# Patient Record
Sex: MALE | Race: WHITE | Employment: OTHER | URBAN - METROPOLITAN AREA
[De-identification: names, ages, dates, MRNs, and addresses within clinical notes are randomized per-mention and may not be internally consistent; named-entity substitution may affect disease eponyms.]

---

## 2017-01-16 ENCOUNTER — TELEPHONE (OUTPATIENT)
Dept: GASTROENTEROLOGY | Facility: CLINIC | Age: 77
End: 2017-01-16

## 2017-01-16 NOTE — TELEPHONE ENCOUNTER
Pt contacted. He saw Dr Yonatan Eid today, and was instructed to call for consult. He has occasional diarrhea, but even when not diarrhea, pt having involuntary stools.  He has GI at the [place], but they can't see him until late March, and he would like to do care

## 2017-01-16 NOTE — TELEPHONE ENCOUNTER
Please advise and call patient to let his kown if dr can see him he has no control over his bowels pcp referred him

## 2017-04-11 ENCOUNTER — OFFICE VISIT (OUTPATIENT)
Dept: GASTROENTEROLOGY | Facility: CLINIC | Age: 77
End: 2017-04-11

## 2017-04-11 VITALS
HEIGHT: 71.5 IN | SYSTOLIC BLOOD PRESSURE: 125 MMHG | WEIGHT: 280.19 LBS | HEART RATE: 64 BPM | DIASTOLIC BLOOD PRESSURE: 76 MMHG | BODY MASS INDEX: 38.37 KG/M2

## 2017-04-11 DIAGNOSIS — Z86.010 HISTORY OF COLON POLYPS: ICD-10-CM

## 2017-04-11 DIAGNOSIS — R19.4 CHANGE IN BOWEL HABITS: Primary | ICD-10-CM

## 2017-04-11 DIAGNOSIS — Z87.438 HISTORY OF PROSTATE DISORDER: ICD-10-CM

## 2017-04-11 DIAGNOSIS — R10.9 ABDOMINAL PAIN IN MALE: ICD-10-CM

## 2017-04-11 PROCEDURE — G0463 HOSPITAL OUTPT CLINIC VISIT: HCPCS | Performed by: INTERNAL MEDICINE

## 2017-04-11 PROCEDURE — 99214 OFFICE O/P EST MOD 30 MIN: CPT | Performed by: INTERNAL MEDICINE

## 2017-04-11 NOTE — H&P
History of present illness: This is a 56-year-old male patient of Dr. Viry Alvarez who is normally followed at the [facility].  He has a history of colon polyps dating to 2011. He had colonoscopy at that time by Dr. Renata Hernandez. He brings partial records. agreeable to proceed. Urology appointment has also been advised and several names were given.

## 2017-04-11 NOTE — PROGRESS NOTES
HPI:    Patient ID: Jennifer Neff is a 78year old male. HPI    Review of Systems   Constitutional: Positive for fatigue and unexpected weight change. Negative for fever, chills, diaphoresis, activity change and appetite change.    HENT: Negative for ear Take 25 mg by mouth 2 (two) times daily. Disp:  Rfl:    lisinopril (PRINIVIL,ZESTRIL) 40 MG Oral Tab Take 40 mg by mouth daily. Disp:  Rfl:    Metoprolol Tartrate (LOPRESSOR) 100 MG Oral Tab Take 50 mg by mouth 2 (two) times daily.    Disp:  Rfl:    Salome Gamboa breath sounds normal. No stridor. No respiratory distress. He has no wheezes. He has no rales. Abdominal: Soft. Normal appearance and bowel sounds are normal. He exhibits no distension, no fluid wave, no ascites and no mass.  There is no hepatosplenomegal

## 2017-04-11 NOTE — PATIENT INSTRUCTIONS
1.  Continue MiraLAX and fiber daily and adjust as needed according to bowel movements    2. Schedule CT abdomen and pelvis with  oral contrast (allergy to IV contrast)    3. Schedule an appointment with urology    4.   Schedule colonoscopy with split dos

## 2017-04-12 ENCOUNTER — TELEPHONE (OUTPATIENT)
Dept: GASTROENTEROLOGY | Facility: CLINIC | Age: 77
End: 2017-04-12

## 2017-04-12 NOTE — TELEPHONE ENCOUNTER
Scheduled for:  Colonoscopy 84063  Provider Name: Dr Joycelyn Dandy  Date:  Fri 4/28/17  Location:  [facility]  Sedation:  MAC  Time:  12:30 pm  Prep: miralax  Meds/Allergies Reconciled?:  Latex, radiology contrast Iodinated dyes  Diagnosis with codes:  Change of bowel habi

## 2017-04-24 ENCOUNTER — TELEPHONE (OUTPATIENT)
Dept: NEUROLOGY | Facility: CLINIC | Age: 77
End: 2017-04-24

## 2017-04-24 NOTE — TELEPHONE ENCOUNTER
Patient informed he will need to be re-evaluated in the office prior to Dr. Viann Meckel performing an injection. LOV 10/5/16 patient cancelled 2 appointments (1/11/17 and 2/22/17) and no showed one appointment (3/22/17) since last office visit.   Follow up schedu

## 2017-04-25 ENCOUNTER — TELEPHONE (OUTPATIENT)
Dept: GASTROENTEROLOGY | Facility: CLINIC | Age: 77
End: 2017-04-25

## 2017-04-25 NOTE — TELEPHONE ENCOUNTER
Rescheduled for:  Colonoscopy 75772  Provider Name:  Dr. Elodia Thurston  Date:    From-4/28/17  To-5/31/17  [facility]  Sedation:  MAC  Time:   Prince Nunez (pt is aware that [facility] will call the day before to confirm arrival time)  Prep:  Britney

## 2017-05-01 ENCOUNTER — TELEPHONE (OUTPATIENT)
Dept: GASTROENTEROLOGY | Facility: CLINIC | Age: 77
End: 2017-05-01

## 2017-05-01 ENCOUNTER — TELEPHONE (OUTPATIENT)
Dept: NEUROLOGY | Facility: CLINIC | Age: 77
End: 2017-05-01

## 2017-05-01 DIAGNOSIS — M48.02 FORAMINAL STENOSIS OF CERVICAL REGION: ICD-10-CM

## 2017-05-01 DIAGNOSIS — M54.12 CERVICAL RADICULOPATHY: Primary | ICD-10-CM

## 2017-05-01 DIAGNOSIS — M50.90 CERVICAL DISC DISEASE: ICD-10-CM

## 2017-05-01 NOTE — TELEPHONE ENCOUNTER
Records from [facility] reviewed and sent for scanning. Patient had flexible sigmoidoscopy performed in 2015 which is performed because of constipation and incomplete rectal evacuation.   Findings were normal to depth of insertion except for 3 mm re

## 2017-05-01 NOTE — TELEPHONE ENCOUNTER
Spoke to patient who states he is having the same left side neck pain radiating in the left shoulder. Pain is desxcribed as a shooting pain that is intermittent with a pain score of 4-9/10 depending on activity.  Patient is requesting new physical therapy o

## 2017-05-06 ENCOUNTER — HOSPITAL ENCOUNTER (OUTPATIENT)
Dept: CT IMAGING | Facility: HOSPITAL | Age: 77
Discharge: HOME | End: 2017-05-06
Attending: INTERNAL MEDICINE
Payer: MEDICARE

## 2017-05-06 DIAGNOSIS — R10.9 ABDOMINAL PAIN IN MALE: ICD-10-CM

## 2017-05-06 PROCEDURE — 74176 CT ABD & PELVIS W/O CONTRAST: CPT | Performed by: INTERNAL MEDICINE

## 2017-05-10 ENCOUNTER — OFFICE VISIT (OUTPATIENT)
Dept: NEUROLOGY | Facility: CLINIC | Age: 77
End: 2017-05-10

## 2017-05-10 VITALS
HEART RATE: 60 BPM | HEIGHT: 72 IN | BODY MASS INDEX: 37.11 KG/M2 | DIASTOLIC BLOOD PRESSURE: 80 MMHG | RESPIRATION RATE: 18 BRPM | WEIGHT: 274 LBS | OXYGEN SATURATION: 92 % | SYSTOLIC BLOOD PRESSURE: 142 MMHG

## 2017-05-10 DIAGNOSIS — M48.061 LUMBAR CANAL STENOSIS: ICD-10-CM

## 2017-05-10 DIAGNOSIS — G89.29 CHRONIC MIDLINE LOW BACK PAIN WITHOUT SCIATICA: ICD-10-CM

## 2017-05-10 DIAGNOSIS — M54.50 CHRONIC MIDLINE LOW BACK PAIN WITHOUT SCIATICA: ICD-10-CM

## 2017-05-10 DIAGNOSIS — M96.1 LUMBAR POST-LAMINECTOMY SYNDROME: ICD-10-CM

## 2017-05-10 DIAGNOSIS — M48.02 FORAMINAL STENOSIS OF CERVICAL REGION: ICD-10-CM

## 2017-05-10 DIAGNOSIS — M54.16 LUMBAR RADICULOPATHY: ICD-10-CM

## 2017-05-10 DIAGNOSIS — M50.90 CERVICAL DISC DISEASE: ICD-10-CM

## 2017-05-10 DIAGNOSIS — R26.9 NEUROLOGIC GAIT DYSFUNCTION: ICD-10-CM

## 2017-05-10 DIAGNOSIS — M48.061 LUMBAR FORAMINAL STENOSIS: Primary | ICD-10-CM

## 2017-05-10 DIAGNOSIS — M51.26 LUMBAR HERNIATED DISC: ICD-10-CM

## 2017-05-10 DIAGNOSIS — M51.9 LUMBAR DISC DISEASE: ICD-10-CM

## 2017-05-10 PROCEDURE — 99214 OFFICE O/P EST MOD 30 MIN: CPT | Performed by: PHYSICAL MEDICINE & REHABILITATION

## 2017-05-10 NOTE — PROGRESS NOTES
Low Back Pain H & P    Chief Complaint: Patient presents with:  Low Back Pain: LOV 10/05/16. pt had lumbar injection 05/10/16 with 90% improvement. Pt now has reoccurring low back pain. Pt has back pain if sitting to long or with activity, none at present. History  None on file     Social History Main Topics   Smoking status: Never Smoker     Smokeless tobacco: Never Used    Alcohol Use: Yes  0.0 oz/week    0 Standard drinks or equivalent per week         Comment: Occasional glass of wine    Drug Use: No Spine:    Scoliosis: No scoliosis present     Lumbar Spine Palpation:    Spinous Processes: Non-tender for all Spinous Processes   Z-joints: Non-tender for all Z-joints   SIJ: Non-tender for bilateral SIJ   Piriformis Muscle: Non-tender bilateral Piriformi worked. The patient understands and agrees with the stated plan. Zainab Carranza MD  5/10/2017

## 2017-05-10 NOTE — PATIENT INSTRUCTIONS
Refill policies:    • Allow 2 business days for refills; controlled substances may take longer.   • Contact your pharmacy at least 5 days prior to running out of medication and have them send an electronic request or submit request through the “request re your physician has recommended that you have a procedure or additional testing performed. [facility] will contact your insurance carrier to obtain pre-certification or prior authorization.     Unfortunately, SHENG has seen an increas

## 2017-05-10 NOTE — PROGRESS NOTES
Patient has been scheduled for a bilateral L5 TFESI  on 05/23/17 at the [place]. Medications and allergies reviewed. Patient informed to hold aspirins, nsaids, blood thinners, vitamins and fish oils 5-7 days prior to procedure.  Patient informed we will need v

## 2017-05-17 ENCOUNTER — TELEPHONE (OUTPATIENT)
Dept: GASTROENTEROLOGY | Facility: CLINIC | Age: 77
End: 2017-05-17

## 2017-05-17 NOTE — TELEPHONE ENCOUNTER
Please send letter. Denisse Kline also send a copy of CT scan of the abdomen and pelvis to Dr. Javi Manzanares' office    I mailed letter to pt   I faxed over a copy of letter and CT scan report to Dr Liang Duarte to [phone]  Tel # [phone]  Spoke to Duane Traylor to felix

## 2017-05-22 ENCOUNTER — TELEPHONE (OUTPATIENT)
Dept: NEUROLOGY | Facility: CLINIC | Age: 77
End: 2017-05-22

## 2017-05-22 NOTE — TELEPHONE ENCOUNTER
Spoke to patient who had teeth extracted and is on antibiotics until 5/29/17. Patient calling to re-schedule bilateral L5 TFESIs on 5/23/17. Will be completely off antibiotics on 5/29/17 and re-scheduled in 6/2/17.     Patient reminded to hold aspirins, nsa

## 2017-05-31 ENCOUNTER — LAB REQUISITION (OUTPATIENT)
Dept: LAB | Facility: HOSPITAL | Age: 77
End: 2017-05-31
Payer: MEDICARE

## 2017-05-31 ENCOUNTER — TELEPHONE (OUTPATIENT)
Dept: GASTROENTEROLOGY | Facility: CLINIC | Age: 77
End: 2017-05-31

## 2017-05-31 DIAGNOSIS — Z01.89 ENCOUNTER FOR OTHER SPECIFIED SPECIAL EXAMINATIONS: ICD-10-CM

## 2017-05-31 PROCEDURE — 88305 TISSUE EXAM BY PATHOLOGIST: CPT | Performed by: INTERNAL MEDICINE

## 2017-05-31 NOTE — TELEPHONE ENCOUNTER
Colonoscopy ( [facility]):    preop dx: hx colon polyps, abdominal pain, change in bowel habits  Postop dx: s/p polypectomy x 2, cold snare, transverse, diverticular disease, left sided, internal hemorrhoids    Rec: path pending, patient advised to get sooner ur

## 2017-06-02 ENCOUNTER — OFFICE VISIT (OUTPATIENT)
Dept: SURGERY | Facility: CLINIC | Age: 77
End: 2017-06-02

## 2017-06-02 DIAGNOSIS — M54.16 LUMBAR RADICULOPATHY: Primary | ICD-10-CM

## 2017-06-02 DIAGNOSIS — M96.1 LUMBAR POST-LAMINECTOMY SYNDROME: ICD-10-CM

## 2017-06-02 DIAGNOSIS — M48.061 LUMBAR CANAL STENOSIS: ICD-10-CM

## 2017-06-02 PROCEDURE — 64483 NJX AA&/STRD TFRM EPI L/S 1: CPT | Performed by: PHYSICAL MEDICINE & REHABILITATION

## 2017-06-02 NOTE — PROCEDURES
[facility].    BILATERAL LUMBAR TRANSFORAMINAL   NAME:  Gopal Ruiz    MR #:    [id number] :  1940     PHYSICIAN:  Taty Manzo        Operative Report    DATE OF PROCEDURE: 2017   PREOPERATIVE DIAGNOSES: 1. 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 At this point in time, the patient's skin was anesthetized with 1 to 2 cc of 50:50 mixture of sodium bicarbonate and 1% PF lidocaine without epinephrine.   Then, a 5 inch, 22-gauge spinal

## 2017-06-07 ENCOUNTER — TELEPHONE (OUTPATIENT)
Dept: GASTROENTEROLOGY | Facility: CLINIC | Age: 77
End: 2017-06-07

## 2017-06-07 NOTE — TELEPHONE ENCOUNTER
Please send letter and recall colonoscopy for 5 years.           I mailed out colonoscopy letter to pt  Entered pt into 5 yr recall  em

## 2017-06-16 ENCOUNTER — TELEPHONE (OUTPATIENT)
Dept: NEUROLOGY | Facility: CLINIC | Age: 77
End: 2017-06-16

## 2017-06-16 NOTE — TELEPHONE ENCOUNTER
AP[facility] called patient and he states since his injection Bilateral L5 TFESI on 6/2/17 he has received 100% pain relief. Patient very happy regarding these results. Patient has NOV scheduled in 3 months.  Pt has started physical therapy @ [facility] as ordered by

## 2017-06-27 ENCOUNTER — MED REC SCAN ONLY (OUTPATIENT)
Dept: NEUROLOGY | Facility: CLINIC | Age: 77
End: 2017-06-27

## 2017-07-03 ENCOUNTER — OFFICE VISIT (OUTPATIENT)
Dept: SURGERY | Facility: CLINIC | Age: 77
End: 2017-07-03

## 2017-07-03 VITALS
BODY MASS INDEX: 37.11 KG/M2 | DIASTOLIC BLOOD PRESSURE: 77 MMHG | TEMPERATURE: 98 F | HEIGHT: 72 IN | WEIGHT: 274 LBS | SYSTOLIC BLOOD PRESSURE: 111 MMHG | HEART RATE: 67 BPM

## 2017-07-03 DIAGNOSIS — N39.41 URGE INCONTINENCE: ICD-10-CM

## 2017-07-03 DIAGNOSIS — R35.1 NOCTURIA: ICD-10-CM

## 2017-07-03 DIAGNOSIS — Z79.82 ASPIRIN LONG-TERM USE: ICD-10-CM

## 2017-07-03 DIAGNOSIS — N32.89 BLADDER MASS: ICD-10-CM

## 2017-07-03 DIAGNOSIS — N40.1 BENIGN NON-NODULAR PROSTATIC HYPERPLASIA WITH LOWER URINARY TRACT SYMPTOMS: Primary | ICD-10-CM

## 2017-07-03 LAB
BILIRUB UR QL: NEGATIVE
CLARITY UR: CLEAR
COLOR UR: YELLOW
GLUCOSE UR-MCNC: NEGATIVE MG/DL
HGB UR QL STRIP.AUTO: NEGATIVE
KETONES UR-MCNC: NEGATIVE MG/DL
LEUKOCYTE ESTERASE UR QL STRIP.AUTO: NEGATIVE
NITRITE UR QL STRIP.AUTO: NEGATIVE
PH UR: 6 [PH] (ref 5–8)
PROT UR-MCNC: NEGATIVE MG/DL
SP GR UR STRIP: 1.01 (ref 1–1.03)
UROBILINOGEN UR STRIP-ACNC: <2
VIT C UR-MCNC: NEGATIVE MG/DL

## 2017-07-03 PROCEDURE — 88108 CYTOPATH CONCENTRATE TECH: CPT | Performed by: UROLOGY

## 2017-07-03 PROCEDURE — 51741 ELECTRO-UROFLOWMETRY FIRST: CPT | Performed by: UROLOGY

## 2017-07-03 PROCEDURE — 99205 OFFICE O/P NEW HI 60 MIN: CPT | Performed by: UROLOGY

## 2017-07-03 NOTE — PROGRESS NOTES
Maldonado Culp is a 78year old male. HPI:   Patient presents with:  BPH  Urinary Frequency: patient states he urinates every hour during the daytime with normal fluid intake and nocturia x3    History provided by dianna Coleman present.      1. Voiding feverish at the time. Not in ICU. No operations performed at the time. No recurrence since. Denies personal hx or FHx of kidney stones. He states his brother has a cystoscopy to get his bladder checked every 6 months; no tumor discovered as of yet.      Smo (LASIX) 20 MG Oral Tab Take 40 mg by mouth daily. Disp:  Rfl:    hydrALAzine HCl (APRESOLINE) 25 MG Oral Tab Take 25 mg by mouth 2 (two) times daily. Disp:  Rfl:    lisinopril (PRINIVIL,ZESTRIL) 40 MG Oral Tab Take 40 mg by mouth daily.  Disp:  Rfl:    Me Negative for ear drainage, hearing loss and nasal drainage  Eyes:  Negative for eye discharge. Positive for blurred vision. Hema/Lymph:  Negative for easy bleeding and easy bruising  Integumentary:  Negative for pruritus. Positive for rash.    Musculoskel (pedal)    LABORATORIES    No results found. Because of  severe  voiding problems, decision made to perform complex uroflow 7/3/17.   FINDINGS:   Peak flow rate  7.0 ml/s    Average flow rate  3.9 ml/s  Shape of uroflow curve gently oscillating   Voided increased risk for having tumors or stones of the urinary tract. Although it was discussed with pt that it is unlikely that he has bladder cancer.  Therefore, I recommend urine cytology followed by cystoscopy with possible biopsy, either in the office with cytology, bladder ultrasound and office cystoscopy will all shed light on this problem. RECOMMENDATIONS AND TREATMENT PLAN      1. Urine specimen from uroflow test today for complete urinalysis and urine for cytology    2.   We cannot prescribe Floma performance and is accurate and complete.   Darcy Campuzano MD, 7/3/2017, 1:03 PM

## 2017-07-03 NOTE — PATIENT INSTRUCTIONS
1. Urine specimen from uroflow test today for complete urinalysis and urine for cytology    2.   We cannot prescribe Flomax/tamsulosin or Uroxatral/alfuzosin because these medications the side effects of slight lightheadedness or dizziness and you alread prostate. · Imaging tests. X-rays and other imaging tests can find problems in your kidneys or bladder. · Cystoscopy. This test uses a flexible tube with a camera (scope) to look inside your urinary tract. · Urine flow study.  This test uses a special de more likely to develop this cancer. But they may have higher levels of the prostate-specific antigen (PSA). A higher PSA level may also be a sign of prostate cancer. Certain tests help distinguish BPH from prostate cancer.  They include prostate ultrasound

## 2017-07-31 ENCOUNTER — TELEPHONE (OUTPATIENT)
Dept: SURGERY | Facility: CLINIC | Age: 77
End: 2017-07-31

## 2017-08-01 NOTE — TELEPHONE ENCOUNTER
Called and scheduled appointment for cystoscopy with [facility] for 9/11/17 at 1:20pm. Answered all questions and patient verbalized understanding. Informed patient to call us if he has any other questions or concerns.

## 2017-08-09 ENCOUNTER — OFFICE VISIT (OUTPATIENT)
Dept: NEUROLOGY | Facility: CLINIC | Age: 77
End: 2017-08-09

## 2017-08-09 ENCOUNTER — TELEPHONE (OUTPATIENT)
Dept: NEUROLOGY | Facility: CLINIC | Age: 77
End: 2017-08-09

## 2017-08-09 VITALS
HEIGHT: 72 IN | RESPIRATION RATE: 18 BRPM | OXYGEN SATURATION: 94 % | BODY MASS INDEX: 37.11 KG/M2 | DIASTOLIC BLOOD PRESSURE: 68 MMHG | WEIGHT: 274 LBS | SYSTOLIC BLOOD PRESSURE: 128 MMHG | HEART RATE: 54 BPM

## 2017-08-09 DIAGNOSIS — M48.061 LUMBAR FORAMINAL STENOSIS: ICD-10-CM

## 2017-08-09 DIAGNOSIS — M96.1 LUMBAR POST-LAMINECTOMY SYNDROME: ICD-10-CM

## 2017-08-09 DIAGNOSIS — M48.061 LUMBAR CANAL STENOSIS: ICD-10-CM

## 2017-08-09 DIAGNOSIS — M54.16 LUMBAR RADICULOPATHY: ICD-10-CM

## 2017-08-09 DIAGNOSIS — M25.512 LEFT SHOULDER PAIN, UNSPECIFIED CHRONICITY: Primary | ICD-10-CM

## 2017-08-09 DIAGNOSIS — M25.512 ACUTE PAIN OF LEFT SHOULDER: ICD-10-CM

## 2017-08-09 DIAGNOSIS — M48.02 FORAMINAL STENOSIS OF CERVICAL REGION: ICD-10-CM

## 2017-08-09 DIAGNOSIS — M51.26 LUMBAR HERNIATED DISC: ICD-10-CM

## 2017-08-09 DIAGNOSIS — M51.9 LUMBAR DISC DISEASE: ICD-10-CM

## 2017-08-09 DIAGNOSIS — M67.912 DYSFUNCTION OF LEFT ROTATOR CUFF: ICD-10-CM

## 2017-08-09 DIAGNOSIS — M50.90 CERVICAL DISC DISEASE: ICD-10-CM

## 2017-08-09 PROCEDURE — 99214 OFFICE O/P EST MOD 30 MIN: CPT | Performed by: PHYSICAL MEDICINE & REHABILITATION

## 2017-08-09 NOTE — PATIENT INSTRUCTIONS
Refill policies:    • Allow 2 business days for refills; controlled substances may take longer.   • Contact your pharmacy at least 5 days prior to running out of medication and have them send an electronic request or submit request through the “request re your physician has recommended that you have a procedure or additional testing performed. [facility] will contact your insurance carrier to obtain pre-certification or prior authorization.     Unfortunately, SHENG has seen an increas

## 2017-08-09 NOTE — PROGRESS NOTES
Cervical Pain H & P    Chief Complaint:  Patient presents with:  Low Back Pain: Pt had bilateral L5 TFESI on 06/02/17 with !00% relief of symtoms. Pt is not c/o of any back pain. Pt present today with left shoulder pain 6/19.  Pt stopped physical therapy fo N/A  Number of children: N/A     Occupational History  None on file     Social History Main Topics   Smoking status: Never Smoker    Smokeless tobacco: Never Used    Alcohol use Yes  0.0 oz/week     Comment: Occasional glass of wine    Drug use: No    Sexu rotator Left: 4/5  Deltoid Left: 4+/5  Serratus anterior Left: 3/5   Reflexes: 2+ In the bilateral upper extremities. Osorio's sign Right: Negative   Osorio's sigh Left: Negative     Shoulder: The shoulders are stable.     Medial Border Scapular Winging Bernardo Prieto MD  8/9/2017

## 2017-08-09 NOTE — TELEPHONE ENCOUNTER
Medicare Online for insurance coverage of left shoulder injection cpt codes 41369, 16043,. Insurance was verified and procedure is a covered benefit and does not require authorization. Will inform Nursing.

## 2017-09-11 ENCOUNTER — TELEPHONE (OUTPATIENT)
Dept: SURGERY | Facility: CLINIC | Age: 77
End: 2017-09-11

## 2017-09-11 NOTE — TELEPHONE ENCOUNTER
pt called. He is sick, called to cancel todays Cysto with [facility]. Would like to reschedule.   Thank you

## 2017-09-12 ENCOUNTER — TELEPHONE (OUTPATIENT)
Dept: SURGERY | Facility: CLINIC | Age: 77
End: 2017-09-12

## 2017-09-12 NOTE — TELEPHONE ENCOUNTER
Pt was scheduled for cysto on 09/11 and cancelled - requesting cb to reschedule - please advise - thank you.

## 2017-09-13 NOTE — TELEPHONE ENCOUNTER
Spoke to patient. Patient states he rescheduled his cysto for Wed 10/18/17 @ 8:00 am.  No further action required.

## 2017-09-20 ENCOUNTER — OFFICE VISIT (OUTPATIENT)
Dept: NEUROLOGY | Facility: CLINIC | Age: 77
End: 2017-09-20

## 2017-09-20 VITALS
RESPIRATION RATE: 16 BRPM | BODY MASS INDEX: 36 KG/M2 | DIASTOLIC BLOOD PRESSURE: 72 MMHG | WEIGHT: 265 LBS | SYSTOLIC BLOOD PRESSURE: 114 MMHG | HEART RATE: 68 BPM

## 2017-09-20 DIAGNOSIS — M67.912 DYSFUNCTION OF LEFT ROTATOR CUFF: ICD-10-CM

## 2017-09-20 DIAGNOSIS — M25.512 ACUTE PAIN OF LEFT SHOULDER: Primary | ICD-10-CM

## 2017-09-20 PROCEDURE — 20611 DRAIN/INJ JOINT/BURSA W/US: CPT | Performed by: PHYSICAL MEDICINE & REHABILITATION

## 2017-09-20 RX ORDER — LIDOCAINE HYDROCHLORIDE 10 MG/ML
2.5 INJECTION, SOLUTION INFILTRATION; PERINEURAL ONCE
Status: COMPLETED | OUTPATIENT
Start: 2017-09-20 | End: 2017-09-20

## 2017-09-20 RX ORDER — SPIRONOLACTONE 25 MG/1
25 TABLET ORAL DAILY
Status: ON HOLD | COMMUNITY
End: 2020-01-08

## 2017-09-20 RX ORDER — TRIAMCINOLONE ACETONIDE 40 MG/ML
60 INJECTION, SUSPENSION INTRA-ARTICULAR; INTRAMUSCULAR ONCE
Status: COMPLETED | OUTPATIENT
Start: 2017-09-20 | End: 2017-09-20

## 2017-09-20 NOTE — PATIENT INSTRUCTIONS
Refill policies:    • Allow 2 business days for refills; controlled substances may take longer.   • Contact your pharmacy at least 5 days prior to running out of medication and have them send an electronic request or submit request through the “request re your physician has recommended that you have a procedure or additional testing performed. [facility]) will contact your insurance carrier to obtain pre-certification or prior authorization.     Unfortunately, SHENG has seen an increas

## 2017-10-05 ENCOUNTER — TELEPHONE (OUTPATIENT)
Dept: GASTROENTEROLOGY | Facility: CLINIC | Age: 77
End: 2017-10-05

## 2017-10-05 DIAGNOSIS — R19.4 ALTERED BOWEL HABITS: ICD-10-CM

## 2017-10-05 DIAGNOSIS — K59.09 CHRONIC CONSTIPATION: ICD-10-CM

## 2017-10-05 DIAGNOSIS — R10.9 ABDOMINAL PAIN IN MALE: Primary | ICD-10-CM

## 2017-10-05 NOTE — TELEPHONE ENCOUNTER
It does not sound like  Intestinal, obstruction, rather urgency. CT scan abd and pelvis in May showed enlarge prostate, not intestinal obstruction. No labs are in our records except [facility]  I recommend CBC, TS  H, CMP.lipa

## 2017-10-05 NOTE — TELEPHONE ENCOUNTER
Pt contacted and states he is having the same issues prior to his CLN  On 6/2017. Believes he has a bowel obstruction b/c he has \"urge to go the bathroom, but when I do go it's diarrhea, and it's very small amount. \" States he has mid- abdominal pain rate

## 2017-10-05 NOTE — TELEPHONE ENCOUNTER
Orders entered per Dr. Iqra Salamanca written orders below.  Pt contacted and reviewed Dr. Iqra Salamanca message below, he verbalized understanding of all and will call central scheduling at [phone] to schedule his XR ab obstructive series and will complete labs tomorrow

## 2017-10-09 ENCOUNTER — LAB ENCOUNTER (OUTPATIENT)
Dept: LAB | Facility: HOSPITAL | Age: 77
End: 2017-10-09
Attending: INTERNAL MEDICINE
Payer: MEDICARE

## 2017-10-09 ENCOUNTER — HOSPITAL ENCOUNTER (OUTPATIENT)
Dept: GENERAL RADIOLOGY | Facility: HOSPITAL | Age: 77
Discharge: HOME | End: 2017-10-09
Attending: INTERNAL MEDICINE
Payer: MEDICARE

## 2017-10-09 DIAGNOSIS — R10.9 ABDOMINAL PAIN IN MALE: ICD-10-CM

## 2017-10-09 DIAGNOSIS — K59.09 CHRONIC CONSTIPATION: ICD-10-CM

## 2017-10-09 DIAGNOSIS — R19.4 ALTERED BOWEL HABITS: ICD-10-CM

## 2017-10-09 PROCEDURE — 36415 COLL VENOUS BLD VENIPUNCTURE: CPT

## 2017-10-09 PROCEDURE — 74020 XR ABDOMEN, OBSTRUCTIVE SERIES (CPT=74020): CPT | Performed by: INTERNAL MEDICINE

## 2017-10-09 PROCEDURE — 83690 ASSAY OF LIPASE: CPT

## 2017-10-09 PROCEDURE — 84443 ASSAY THYROID STIM HORMONE: CPT

## 2017-10-09 PROCEDURE — 85025 COMPLETE CBC W/AUTO DIFF WBC: CPT

## 2017-10-09 PROCEDURE — 80053 COMPREHEN METABOLIC PANEL: CPT

## 2017-10-18 ENCOUNTER — TELEPHONE (OUTPATIENT)
Dept: SURGERY | Facility: CLINIC | Age: 77
End: 2017-10-18

## 2017-10-23 ENCOUNTER — TELEPHONE (OUTPATIENT)
Dept: GASTROENTEROLOGY | Facility: CLINIC | Age: 77
End: 2017-10-23

## 2017-10-23 NOTE — TELEPHONE ENCOUNTER
Dr Asad White,    Pt actually has an appt on 10/31/17 for a change in bowel habits.     Future Appointments  Date Time Provider Ulices Yadira   10/31/2017 11:30 AM Jefe Cole MD [facility] [address] [place] [address]   11/15/2017 8:30 AM Mikki Manzo

## 2017-10-23 NOTE — TELEPHONE ENCOUNTER
Okay now I see the patient is on my schedule at 2 PM on December 6. He  can keep that appointmen instead of Wednesday, November 1. He should still take MiraLAX.   Please still inform him that his thyroid function is normal, kidney function abnormal.

## 2017-10-23 NOTE — TELEPHONE ENCOUNTER
Spoke with patient rescheduled cysto for 11/20 @ 2:40 advised to arrive 15 minutes early for procedure.

## 2017-10-23 NOTE — TELEPHONE ENCOUNTER
Pt requesting to be seen sooner than 12/6, pt is having problems moving his bowels, indicates its been a while, but is getting worse. Pls call at:[phone],thanks.

## 2017-10-23 NOTE — TELEPHONE ENCOUNTER
Pt has been trying to reach RN regarding rescheduling his appt with Dr Denzel hTomas. Had appt 9/18 but had to cancel due to migraine.

## 2017-10-23 NOTE — TELEPHONE ENCOUNTER
Spoke with pt and appt made for next week to discuss.     Future Appointments  Date Time Provider Ulices Yadira   10/31/2017 11:30 AM [facility]john [facility] [place]   11/15/2017 8:30 AM Tere Manzo MD [facility]

## 2017-10-23 NOTE — TELEPHONE ENCOUNTER
I called the pt. He states he has been trying to reach Urology to schedule an appt but they are not calling him back. I have a note from Dr Geovanni Basurto that the pt should f/u with urology due to a high creatine level. Please call the pt back.  I routed this messag

## 2017-10-23 NOTE — TELEPHONE ENCOUNTER
Pt called stating pt has not heard back from the nurse to schedule cysto. Pt stated dr gonsalves's office called pt with results. Something is elevated with the kidneys.   Call pt

## 2017-10-23 NOTE — TELEPHONE ENCOUNTER
Still patient his thyroid function is normal.  If he wants to see me in return follow-up I can see him on Wednesday, November 1 at 4 PM.  His kidney function is abnormal and he should follow-up with his urologist or his primary care provider regarding a cr

## 2017-10-23 NOTE — TELEPHONE ENCOUNTER
He should see Dr. Flor Avitia,, Dr. Jackie Moscoso, or Dr. Nicolasa Lobato within a few weeks. He has an enlarged prostate associated with elevated BUN and creatinine.   Alternatively he should follow-up with his primary care provider

## 2017-10-24 ENCOUNTER — TELEPHONE (OUTPATIENT)
Dept: SURGERY | Facility: CLINIC | Age: 77
End: 2017-10-24

## 2017-10-24 NOTE — TELEPHONE ENCOUNTER
Dr Rafia Lares,    Do you want this pt to delay his appt with you or is it still ok for him to come see you?  See appt below    The nurse manager from Urology contacted and  spoke with this pt and he is scheduled for his repeat Cysto on 11/20/17    Future Appointm

## 2017-10-24 NOTE — TELEPHONE ENCOUNTER
Nurse from the gastro dept requesting to speak with the nurse regarding mutual pt. Call transferred to the nurse.

## 2017-10-31 ENCOUNTER — OFFICE VISIT (OUTPATIENT)
Dept: GASTROENTEROLOGY | Facility: CLINIC | Age: 77
End: 2017-10-31

## 2017-10-31 VITALS
DIASTOLIC BLOOD PRESSURE: 76 MMHG | SYSTOLIC BLOOD PRESSURE: 132 MMHG | WEIGHT: 256.19 LBS | BODY MASS INDEX: 35.08 KG/M2 | HEIGHT: 71.5 IN | HEART RATE: 82 BPM

## 2017-10-31 DIAGNOSIS — K59.09 CHRONIC CONSTIPATION: Primary | ICD-10-CM

## 2017-10-31 DIAGNOSIS — R93.5 ABNORMAL CT OF THE ABDOMEN: ICD-10-CM

## 2017-10-31 DIAGNOSIS — Z86.010 HISTORY OF COLON POLYPS: ICD-10-CM

## 2017-10-31 DIAGNOSIS — M62.89 PELVIC FLOOR DYSFUNCTION: ICD-10-CM

## 2017-10-31 PROCEDURE — 99214 OFFICE O/P EST MOD 30 MIN: CPT | Performed by: INTERNAL MEDICINE

## 2017-10-31 RX ORDER — ALPRAZOLAM 0.5 MG/1
0.5 TABLET ORAL NIGHTLY PRN
COMMUNITY
End: 2018-10-31

## 2017-10-31 RX ORDER — BISACODYL 10 MG
10 SUPPOSITORY, RECTAL RECTAL DAILY
Qty: 30 SUPPOSITORY | Refills: 12 | Status: ON HOLD | OUTPATIENT
Start: 2017-10-31 | End: 2020-01-08

## 2017-10-31 NOTE — PATIENT INSTRUCTIONS
1. Continue Miralax twice daily to be adjusted depending on results. Continue citrocel daily  2. Add Dulcolax suppository daily as needed  3. Follow up with Dr. Jeremie Garcia as scheduled. Tell him about your kidney function  4.   Follow up with your cardiologis

## 2017-10-31 NOTE — PROGRESS NOTES
HPI:    Patient ID: Ayla Olivares is a 78year old male. HPI    Review of Systems   Constitutional: Negative for activity change, appetite change, chills, diaphoresis, fatigue, fever and unexpected weight change.    HENT: Negative for mouth sores, sore t allopurinol (ZYLOPRIM) 300 MG Oral Tab Take 300 mg by mouth daily. Disp:  Rfl:    AmLODIPine Besylate (NORVASC) 2.5 MG Oral Tab Take 2.5 mg by mouth daily. Disp:  Rfl:    Cinacalcet HCl (SENSIPAR) 30 MG Oral Tab Take 30 mg by mouth daily.  Disp:  Rfl: exhibits no discharge. No scleral icterus. Neck: Normal range of motion. Neck supple. No JVD present. No tracheal deviation present. No thyromegaly present. Cardiovascular: Normal rate, regular rhythm and normal heart sounds. No murmur heard.   Pulmo

## 2017-10-31 NOTE — H&P
History of present illness: This is a 82-year-old male here patient of Dr. Justice Mckeon in follow-up after colonoscopy. The patient had originally seen me for consultation in April of this year. He was diagnosed with pelvic floor dysfunction.   Indeed he h takes fiber additives. I have advised Dulcolax suppositories as needed. In addition I have advised that he follow-up with urology and his primary care provider regarding his partial bladder outlet obstruction, and coronary artery calcifications.   He chadwick

## 2017-11-20 ENCOUNTER — OFFICE VISIT (OUTPATIENT)
Dept: SURGERY | Facility: CLINIC | Age: 77
End: 2017-11-20

## 2017-11-20 VITALS
WEIGHT: 256 LBS | RESPIRATION RATE: 16 BRPM | HEIGHT: 72 IN | BODY MASS INDEX: 34.67 KG/M2 | HEART RATE: 48 BPM | SYSTOLIC BLOOD PRESSURE: 144 MMHG | TEMPERATURE: 99 F | DIASTOLIC BLOOD PRESSURE: 70 MMHG

## 2017-11-20 DIAGNOSIS — N19 RENAL FAILURE, UNSPECIFIED CHRONICITY: ICD-10-CM

## 2017-11-20 DIAGNOSIS — N39.41 URGE INCONTINENCE: ICD-10-CM

## 2017-11-20 DIAGNOSIS — Z79.82 ASPIRIN LONG-TERM USE: ICD-10-CM

## 2017-11-20 DIAGNOSIS — N40.1 BENIGN PROSTATIC HYPERPLASIA WITH URINARY FREQUENCY: ICD-10-CM

## 2017-11-20 DIAGNOSIS — R93.41 ABNORMAL CT SCAN, BLADDER: Primary | ICD-10-CM

## 2017-11-20 DIAGNOSIS — R35.0 BENIGN PROSTATIC HYPERPLASIA WITH URINARY FREQUENCY: ICD-10-CM

## 2017-11-20 PROCEDURE — 99213 OFFICE O/P EST LOW 20 MIN: CPT | Performed by: UROLOGY

## 2017-11-20 PROCEDURE — 52000 CYSTOURETHROSCOPY: CPT | Performed by: UROLOGY

## 2017-11-20 NOTE — PROGRESS NOTES
PREOPERATIVE DIAGNOSIS:     Bladder mass On CT 5/6/17      POSTOP DIAGNOSIS:               The same;  No stones, tumors or CIS     PROCEDURE:              Cystoscopy              ANESTHESIA:     2% Xylocaine jelly local;  also see above    FINDINGS:  Strict In order to have such an accurate discussion,     I first review with patient their  urological/ medical record  and    CHART REVIEW: UTI 5-6 years ago from 7/03/2017 and pt hospitalized at [facility]. He states he was feverish at the time. Not in ICU.  No o 5/6/17 CT abd + pelvis without contrast, indications = bilateral lower quadrant pain and pelvic pain with constipation: kidneys = low density probable cysts present bilaterally; bones = multilevel degenerative changes of the visualized spine, degenerative Patient is on aspirin 81 mg and at risk for future surgical procedures and will need to hold medication prior to any surgical procedure. RECOMMENDATIONS AND TREATMENT PLAN     1.   Because of new discovery of impaired kidney function/renal failure, pl Ubaldo Schwartz MD,  personally performed the services described in this documentation. All medical record entries made by the scribe were at my direction and in my presence.   I have reviewed the chart and discharge instructions (if applicable) and ag

## 2017-11-20 NOTE — PATIENT INSTRUCTIONS
1.  Because of new discovery of impaired kidney function/renal failure, please get ultrasound of the kidneys to make sure blockage of the kidneys has not developed since your CT of 5/6/17 which showed no blockage of the kidneys.   If ultrasound of the kidne needed. The goal of treatment is to improve urine flow and reduce symptoms. Treatments can include medicine and procedures. Your healthcare provider will discuss treatment options with you as needed.   Home care  The following guidelines will help you care infection (increased urge to urinate, burning when passing urine, foul-smelling urine)  Date Last Reviewed: 7/1/2016  © 7870-8542 [employer]. [address] [facility], [address] [place] [address]. All rights reserved.  This information is not intended as

## 2018-02-14 ENCOUNTER — HOSPITAL ENCOUNTER (OUTPATIENT)
Dept: ULTRASOUND IMAGING | Facility: HOSPITAL | Age: 78
Discharge: HOME | End: 2018-02-14
Attending: UROLOGY
Payer: MEDICARE

## 2018-02-14 DIAGNOSIS — N19 RENAL FAILURE, UNSPECIFIED CHRONICITY: ICD-10-CM

## 2018-02-14 PROCEDURE — 76770 US EXAM ABDO BACK WALL COMP: CPT | Performed by: UROLOGY

## 2018-02-15 ENCOUNTER — HOSPITAL ENCOUNTER (OUTPATIENT)
Dept: ULTRASOUND IMAGING | Facility: HOSPITAL | Age: 78
Discharge: HOME | End: 2018-02-15
Attending: UROLOGY
Payer: MEDICARE

## 2018-02-15 DIAGNOSIS — R93.89 IMAGE TEST INCONCLUSIVE: ICD-10-CM

## 2018-02-20 ENCOUNTER — TELEPHONE (OUTPATIENT)
Dept: GASTROENTEROLOGY | Facility: CLINIC | Age: 78
End: 2018-02-20

## 2018-02-20 NOTE — TELEPHONE ENCOUNTER
Kayla Grant PA-C is asking us to take this pt off her schedule this Friday and change him to Dr Shara Douglass schedule    2/23/2018 10:00 AM Jennifer Huang [facility]

## 2018-02-20 NOTE — TELEPHONE ENCOUNTER
I spoke to the pt, He was informed of the time change of his appt this Friday.  He is now on Dr Rosaline Phillips schedule at 1 pm. He verbalizes understanding of location and time    2/23/2018 1:00 PM Xin Crews MD [facility] [place] Dillonjason CASTILLO

## 2018-03-05 NOTE — TELEPHONE ENCOUNTER
Erick Hernandez, thank you for taking care of this initially. It seems as though the patient cancelled. Please have him scheduled with Dr. Tonny De La Vega. Will route to [facility] as well. Phone room should be aware.

## 2018-03-06 NOTE — TELEPHONE ENCOUNTER
JEFFYI - Pt ret'd call and scheduled appt with Dr. Samuel Gutierrez for 4/11/18 (next available). Appt with Ce on 3/17/18 was cancelled.

## 2018-03-06 NOTE — TELEPHONE ENCOUNTER
Ce    Pt is rescheduled per the phone room.     4/11/2018 2:00 PM Lynsey Newman MD [facility]

## 2018-04-26 ENCOUNTER — LAB ENCOUNTER (OUTPATIENT)
Dept: LAB | Facility: HOSPITAL | Age: 78
End: 2018-04-26
Attending: FAMILY MEDICINE
Payer: MEDICARE

## 2018-04-26 DIAGNOSIS — R19.7 DIARRHEA: Primary | ICD-10-CM

## 2018-04-26 DIAGNOSIS — R19.5 LOOSE STOOLS: ICD-10-CM

## 2018-04-26 PROCEDURE — 87493 C DIFF AMPLIFIED PROBE: CPT

## 2018-04-28 ENCOUNTER — TELEPHONE (OUTPATIENT)
Dept: GASTROENTEROLOGY | Facility: CLINIC | Age: 78
End: 2018-04-28

## 2018-08-14 ENCOUNTER — TELEPHONE (OUTPATIENT)
Dept: GASTROENTEROLOGY | Facility: CLINIC | Age: 78
End: 2018-08-14

## 2018-08-14 ENCOUNTER — OFFICE VISIT (OUTPATIENT)
Dept: GASTROENTEROLOGY | Facility: CLINIC | Age: 78
End: 2018-08-14
Payer: MEDICARE

## 2018-08-14 VITALS
HEART RATE: 60 BPM | WEIGHT: 273 LBS | SYSTOLIC BLOOD PRESSURE: 110 MMHG | DIASTOLIC BLOOD PRESSURE: 67 MMHG | HEIGHT: 72 IN | BODY MASS INDEX: 36.98 KG/M2

## 2018-08-14 DIAGNOSIS — Z86.010 HISTORY OF COLON POLYPS: Primary | ICD-10-CM

## 2018-08-14 DIAGNOSIS — K21.9 GASTROESOPHAGEAL REFLUX DISEASE, ESOPHAGITIS PRESENCE NOT SPECIFIED: Primary | ICD-10-CM

## 2018-08-14 DIAGNOSIS — R10.13 EPIGASTRIC PAIN: ICD-10-CM

## 2018-08-14 DIAGNOSIS — K59.09 CHRONIC CONSTIPATION: ICD-10-CM

## 2018-08-14 DIAGNOSIS — K21.9 GASTROESOPHAGEAL REFLUX DISEASE, ESOPHAGITIS PRESENCE NOT SPECIFIED: ICD-10-CM

## 2018-08-14 DIAGNOSIS — Z01.818 PREOPERATIVE CLEARANCE: ICD-10-CM

## 2018-08-14 PROCEDURE — 99214 OFFICE O/P EST MOD 30 MIN: CPT | Performed by: INTERNAL MEDICINE

## 2018-08-14 RX ORDER — BISACODYL 10 MG
10 SUPPOSITORY, RECTAL RECTAL DAILY
Qty: 30 SUPPOSITORY | Refills: 2 | Status: SHIPPED | OUTPATIENT
Start: 2018-08-14 | End: 2018-10-31

## 2018-08-14 NOTE — PATIENT INSTRUCTIONS
1. Schedule EGD with MAC at [address]    2. Continue omeprazole 20 mg daily before breakfast    3. Avoid aleve if possible . 4.  Dulcolax suppository as needed for BMs    5.   Increase Citrocel  to 3-6 capsules daily as needed

## 2018-08-14 NOTE — TELEPHONE ENCOUNTER
Scheduled for:  EGD - 12141  Provider Name:  Dr. Soco Grullon  Date:  9/21/18  Location:  [facility]  Sedation:  MAC  Time:  2:00 pm (pt is aware to arrive at 1:00 pm)  Prep:  NPO after midnight, Prep instructions were given to pt in the office, pt verbalized understandi

## 2018-08-14 NOTE — PROGRESS NOTES
HPI:    Patient ID: Angela Prado is a 70year old male. HPI    Review of Systems   Constitutional: Positive for unexpected weight change. Negative for appetite change, chills, diaphoresis, fatigue and fever.         Significant weight gain over time, BM Cap Take 220 mg by mouth as needed. Disp:  Rfl:    acetaminophen (TYLENOL) 325 MG Oral Tab Take 325 mg by mouth as needed for Pain. Disp:  Rfl:    allopurinol (ZYLOPRIM) 300 MG Oral Tab Take 300 mg by mouth daily.  Disp:  Rfl:    AmLODIPine Besylate (NORV Imaging & Referrals:  None       #[id number]

## 2018-08-14 NOTE — H&P
History of present illness: This is a 53-year-old male who I last saw in 2017. He is a patient of Dr. Brigette Prado. His last colonoscopy was in April 2017 which showed 2 benign tubular adenomas. His next colonoscopy was recalled to 2022.     The patient s

## 2018-08-22 ENCOUNTER — OFFICE VISIT (OUTPATIENT)
Dept: NEUROLOGY | Facility: CLINIC | Age: 78
End: 2018-08-22
Payer: MEDICARE

## 2018-08-22 ENCOUNTER — TELEPHONE (OUTPATIENT)
Dept: NEUROLOGY | Facility: CLINIC | Age: 78
End: 2018-08-22

## 2018-08-22 VITALS
RESPIRATION RATE: 16 BRPM | WEIGHT: 272 LBS | HEART RATE: 78 BPM | BODY MASS INDEX: 36.84 KG/M2 | DIASTOLIC BLOOD PRESSURE: 84 MMHG | SYSTOLIC BLOOD PRESSURE: 122 MMHG | HEIGHT: 72 IN

## 2018-08-22 DIAGNOSIS — M25.511 ACUTE PAIN OF RIGHT SHOULDER: Primary | ICD-10-CM

## 2018-08-22 DIAGNOSIS — M19.011 ARTHRITIS OF RIGHT ACROMIOCLAVICULAR JOINT: ICD-10-CM

## 2018-08-22 DIAGNOSIS — M75.41 IMPINGEMENT SYNDROME OF RIGHT SHOULDER: ICD-10-CM

## 2018-08-22 DIAGNOSIS — M51.26 LUMBAR HERNIATED DISC: ICD-10-CM

## 2018-08-22 DIAGNOSIS — M75.111 INCOMPLETE TEAR OF RIGHT ROTATOR CUFF: ICD-10-CM

## 2018-08-22 DIAGNOSIS — M54.16 LUMBAR RADICULOPATHY: ICD-10-CM

## 2018-08-22 DIAGNOSIS — M96.1 LUMBAR POST-LAMINECTOMY SYNDROME: ICD-10-CM

## 2018-08-22 DIAGNOSIS — M48.062 SPINAL STENOSIS OF LUMBAR REGION WITH NEUROGENIC CLAUDICATION: ICD-10-CM

## 2018-08-22 DIAGNOSIS — M67.911 DYSFUNCTION OF RIGHT ROTATOR CUFF: ICD-10-CM

## 2018-08-22 DIAGNOSIS — M51.9 LUMBAR DISC DISEASE: ICD-10-CM

## 2018-08-22 DIAGNOSIS — M48.061 LUMBAR FORAMINAL STENOSIS: ICD-10-CM

## 2018-08-22 PROCEDURE — 20611 DRAIN/INJ JOINT/BURSA W/US: CPT | Performed by: PHYSICAL MEDICINE & REHABILITATION

## 2018-08-22 PROCEDURE — 99214 OFFICE O/P EST MOD 30 MIN: CPT | Performed by: PHYSICAL MEDICINE & REHABILITATION

## 2018-08-22 PROCEDURE — 76881 US COMPL JOINT R-T W/IMG: CPT | Performed by: PHYSICAL MEDICINE & REHABILITATION

## 2018-08-22 NOTE — TELEPHONE ENCOUNTER
Medicare Online for insurance coverage of injection procedure Right shoulder injection under ultrasound guidance with CPT codes 74141 / 52634 / Caren Alva .  Insurance was verified and procedure  is a covered benefit and does not require authorization.   Dr Vic Loyola

## 2018-08-22 NOTE — TELEPHONE ENCOUNTER
[employer] for insurance coverage of left L5 TFESI cpt codes 56363, 50876. Insurance was verified and procedure is a covered benefit and does not require authorization.  Will inform Nursing

## 2018-08-22 NOTE — PROGRESS NOTES
Low Back Pain H & P    Chief Complaint: Patient presents with:  Shoulder Pain: LOV: 09/20/17. Patient presents for a c/o R shoulder pain that 's his main concern, lower back pain L side to mid, and and L knee pain that gives out at times.  Patient states he Age of Onset   • Other [OTHER] Father      Natural Causes   • Other [OTHER] Mother      Aneurysm       Social History     Social History  Social History   Marital status: Single  Spouse name: N/A    Years of education: N/A  Number of children: N/A     Phani posterior pulse-RIGHT 2+   Tibialis posterior pulse-LEFT 2+     Neurological Lower Extremity:    Light Touch Sensation: Intact in bilateral Lower Extremities   LE Muscle Strength:  All LE strength measurements 5/5 except:  Hamstring RIGHT:   4+/5  Hamstring left mild-mod/right mild bulging disc & central annular tear, L3-4 mod diffuse bulging disc, L4-5 left > right mod diffuse, L5-S1 left mild-mod foraminal bulging disc    8. L5-S1 left mod, L4-5 bilateral mod, L3-4 left mild foraminal stenosis    9.  Sung Evens

## 2018-08-22 NOTE — PROCEDURES
I did an ultrasound examination of the right shoulder in the office today:   Biceps Tendon:   Normal   Subscapularis Tendon:  Mild-moderate inferior full thickness tear  AC Joint:   Moderate degenerative arthritis  Impingement Sign:  Positive  Subdeltoid B

## 2018-08-22 NOTE — PATIENT INSTRUCTIONS
As of October 6th 2014, the Drug Enforcement Agency [facility]) is reclassifying all hydrocodone combination medications from Schedule III to Schedule II. This includes medications such as Norco, Vicodin, Lortab, Zohydro, and Vicoprofen.     What this means for y did a right shoulder diagnostic ultrasound examination in the office today. (see procedure note)    I did a right shoulder steroid injection in the office today under ultrasound guidance.   (see procedure note)    He will get an x-ray of the right shoulder

## 2018-08-23 RX ORDER — LIDOCAINE HYDROCHLORIDE 10 MG/ML
2.5 INJECTION, SOLUTION INFILTRATION; PERINEURAL ONCE
Status: COMPLETED | OUTPATIENT
Start: 2018-08-23 | End: 2018-08-23

## 2018-08-23 RX ORDER — TRIAMCINOLONE ACETONIDE 40 MG/ML
60 INJECTION, SUSPENSION INTRA-ARTICULAR; INTRAMUSCULAR ONCE
Status: COMPLETED | OUTPATIENT
Start: 2018-08-23 | End: 2018-08-23

## 2018-08-23 NOTE — TELEPHONE ENCOUNTER
Patient has been scheduled for a bilateral L5 TFESI's. on 9/7/2018 at the [facility]. Medications and allergies reviewed. Patient informed to hold aspirins, nsaids, blood thinners, vitamins and fish oils 3-7 days prior to procedure.  Patient informed we will nee

## 2018-08-29 ENCOUNTER — HOSPITAL ENCOUNTER (OUTPATIENT)
Dept: GENERAL RADIOLOGY | Age: 78
Discharge: HOME | End: 2018-08-29
Attending: PHYSICAL MEDICINE & REHABILITATION
Payer: MEDICARE

## 2018-08-29 DIAGNOSIS — M67.911 DYSFUNCTION OF RIGHT ROTATOR CUFF: ICD-10-CM

## 2018-08-29 DIAGNOSIS — M25.511 ACUTE PAIN OF RIGHT SHOULDER: ICD-10-CM

## 2018-08-29 PROCEDURE — 73030 X-RAY EXAM OF SHOULDER: CPT | Performed by: PHYSICAL MEDICINE & REHABILITATION

## 2018-09-04 ENCOUNTER — TELEPHONE (OUTPATIENT)
Dept: SURGERY | Facility: CLINIC | Age: 78
End: 2018-09-04

## 2018-09-04 DIAGNOSIS — K59.01 CONSTIPATION BY DELAYED COLONIC TRANSIT: ICD-10-CM

## 2018-09-04 DIAGNOSIS — N32.0 BLADDER NECK OBSTRUCTION: ICD-10-CM

## 2018-09-04 DIAGNOSIS — R35.0 URINARY FREQUENCY: Primary | ICD-10-CM

## 2018-09-04 DIAGNOSIS — R33.9 URINARY RETENTION: ICD-10-CM

## 2018-09-04 DIAGNOSIS — R35.0 BENIGN PROSTATIC HYPERPLASIA WITH URINARY FREQUENCY: ICD-10-CM

## 2018-09-04 DIAGNOSIS — N40.1 BENIGN PROSTATIC HYPERPLASIA WITH URINARY FREQUENCY: ICD-10-CM

## 2018-09-04 NOTE — TELEPHONE ENCOUNTER
Spoke with pt and determined that for the last 2 weeks he has been having to urinate every 15-20 min with severe urgency also during the day and at night getting up every hour.  Denies pain or burning with urination, blood in his urine, cloudy foul smelling

## 2018-09-04 NOTE — TELEPHONE ENCOUNTER
Please call patient back; I agree with the recommendations given to him by urology nurse Ana Marcos and I agree that he should have a bladder ultrasound--I will enter that order.   Because there is a suspicion of constipation contributing to his urinating problem

## 2018-09-04 NOTE — TELEPHONE ENCOUNTER
Pt states he has been having urinary issues for the past 2 weeks, only wants to discuss further with RN. Pls call thank you.

## 2018-09-05 NOTE — TELEPHONE ENCOUNTER
I spoke with pt and informed him of the info and orders in [facility]'s msg below and I gave the central schdg. # to call for the US and pt verbalized understanding and will comply.

## 2018-09-06 ENCOUNTER — MED REC SCAN ONLY (OUTPATIENT)
Dept: NEUROLOGY | Facility: CLINIC | Age: 78
End: 2018-09-06

## 2018-09-10 ENCOUNTER — TELEPHONE (OUTPATIENT)
Dept: NEUROLOGY | Facility: CLINIC | Age: 78
End: 2018-09-10

## 2018-09-12 ENCOUNTER — APPOINTMENT (OUTPATIENT)
Dept: LAB | Facility: HOSPITAL | Age: 78
End: 2018-09-12
Attending: UROLOGY
Payer: MEDICARE

## 2018-09-12 ENCOUNTER — HOSPITAL ENCOUNTER (OUTPATIENT)
Dept: GENERAL RADIOLOGY | Facility: HOSPITAL | Age: 78
Discharge: HOME | End: 2018-09-12
Attending: UROLOGY
Payer: MEDICARE

## 2018-09-12 DIAGNOSIS — K59.01 CONSTIPATION BY DELAYED COLONIC TRANSIT: ICD-10-CM

## 2018-09-12 DIAGNOSIS — R35.0 URINARY FREQUENCY: ICD-10-CM

## 2018-09-12 LAB
BILIRUB UR QL: NEGATIVE
CLARITY UR: CLEAR
COLOR UR: YELLOW
GLUCOSE UR-MCNC: NEGATIVE MG/DL
HGB UR QL STRIP.AUTO: NEGATIVE
KETONES UR-MCNC: NEGATIVE MG/DL
LEUKOCYTE ESTERASE UR QL STRIP.AUTO: NEGATIVE
NITRITE UR QL STRIP.AUTO: NEGATIVE
PH UR: 5 [PH] (ref 5–8)
PROT UR-MCNC: NEGATIVE MG/DL
SP GR UR STRIP: 1.02 (ref 1–1.03)
UROBILINOGEN UR STRIP-ACNC: <2
VIT C UR-MCNC: NEGATIVE MG/DL

## 2018-09-12 PROCEDURE — 81003 URINALYSIS AUTO W/O SCOPE: CPT

## 2018-09-12 PROCEDURE — 87086 URINE CULTURE/COLONY COUNT: CPT

## 2018-09-12 PROCEDURE — 74021 RADEX ABDOMEN 3+ VIEWS: CPT | Performed by: UROLOGY

## 2018-09-21 ENCOUNTER — HOSPITAL ENCOUNTER (OUTPATIENT)
Facility: HOSPITAL | Age: 78
Setting detail: HOSPITAL OUTPATIENT SURGERY
Discharge: HOME | End: 2018-09-21
Attending: INTERNAL MEDICINE | Admitting: INTERNAL MEDICINE
Payer: MEDICARE

## 2018-09-21 ENCOUNTER — ANESTHESIA EVENT (OUTPATIENT)
Dept: ENDOSCOPY | Facility: HOSPITAL | Age: 78
End: 2018-09-21
Payer: MEDICARE

## 2018-09-21 ENCOUNTER — ANESTHESIA (OUTPATIENT)
Dept: ENDOSCOPY | Facility: HOSPITAL | Age: 78
End: 2018-09-21
Payer: MEDICARE

## 2018-09-21 DIAGNOSIS — R10.13 EPIGASTRIC PAIN: ICD-10-CM

## 2018-09-21 DIAGNOSIS — K21.9 GASTROESOPHAGEAL REFLUX DISEASE, ESOPHAGITIS PRESENCE NOT SPECIFIED: ICD-10-CM

## 2018-09-21 PROBLEM — K31.7 MULTIPLE GASTRIC POLYPS: Status: ACTIVE | Noted: 2018-09-21

## 2018-09-21 PROCEDURE — 0DB78ZX EXCISION OF STOMACH, PYLORUS, VIA NATURAL OR ARTIFICIAL OPENING ENDOSCOPIC, DIAGNOSTIC: ICD-10-PCS | Performed by: INTERNAL MEDICINE

## 2018-09-21 PROCEDURE — 0DB68ZX EXCISION OF STOMACH, VIA NATURAL OR ARTIFICIAL OPENING ENDOSCOPIC, DIAGNOSTIC: ICD-10-PCS | Performed by: INTERNAL MEDICINE

## 2018-09-21 PROCEDURE — 43239 EGD BIOPSY SINGLE/MULTIPLE: CPT | Performed by: INTERNAL MEDICINE

## 2018-09-21 RX ORDER — SODIUM CHLORIDE, SODIUM LACTATE, POTASSIUM CHLORIDE, CALCIUM CHLORIDE 600; 310; 30; 20 MG/100ML; MG/100ML; MG/100ML; MG/100ML
INJECTION, SOLUTION INTRAVENOUS CONTINUOUS
Status: DISCONTINUED | OUTPATIENT
Start: 2018-09-21 | End: 2018-09-21

## 2018-09-21 RX ORDER — LIDOCAINE HYDROCHLORIDE 10 MG/ML
INJECTION, SOLUTION EPIDURAL; INFILTRATION; INTRACAUDAL; PERINEURAL AS NEEDED
Status: DISCONTINUED | OUTPATIENT
Start: 2018-09-21 | End: 2018-09-21 | Stop reason: SURG

## 2018-09-21 RX ORDER — NALOXONE HYDROCHLORIDE 0.4 MG/ML
80 INJECTION, SOLUTION INTRAMUSCULAR; INTRAVENOUS; SUBCUTANEOUS AS NEEDED
Status: DISCONTINUED | OUTPATIENT
Start: 2018-09-21 | End: 2018-09-21

## 2018-09-21 RX ADMIN — SODIUM CHLORIDE, SODIUM LACTATE, POTASSIUM CHLORIDE, CALCIUM CHLORIDE: 600; 310; 30; 20 INJECTION, SOLUTION INTRAVENOUS at 13:50:00

## 2018-09-21 RX ADMIN — SODIUM CHLORIDE, SODIUM LACTATE, POTASSIUM CHLORIDE, CALCIUM CHLORIDE: 600; 310; 30; 20 INJECTION, SOLUTION INTRAVENOUS at 14:05:00

## 2018-09-21 RX ADMIN — LIDOCAINE HYDROCHLORIDE 40 MG: 10 INJECTION, SOLUTION EPIDURAL; INFILTRATION; INTRACAUDAL; PERINEURAL at 13:57:00

## 2018-09-21 NOTE — ANESTHESIA PREPROCEDURE EVALUATION
Anesthesia PreOp Note    HPI:     Santosh Regan is a 70year old male who presents for preoperative consultation requested by: Montrell Barcenas MD    Date of Surgery: 9/21/2018    Procedure(s):  ESOPHAGOGASTRODUODENOSCOPY (EGD)  Indication: Epig mod diffuse, L5-S1 left mild-mod foraminal bulging disc         Date Noted: 01/20/2016      Lumbar post-laminectomy syndrome: right L3-4         Date Noted: 01/20/2016      L3-4 right mild paracentral HNP         Date Noted: 01/20/2016      bilateral L5-S1 FLUoxetine HCl (PROZAC) 20 MG Oral Cap Take 20 mg by mouth daily. Disp:  Rfl:  9/21/2018   lisinopril (PRINIVIL,ZESTRIL) 40 MG Oral Tab Take 40 mg by mouth daily.  Disp:  Rfl:  9/21/2018   Metoprolol Tartrate (LOPRESSOR) 100 MG Oral Tab Take 50 mg by mout Aneurysm     Social History    Socioeconomic History      Marital status: Single      Spouse name: Not on file      Number of children: Not on file      Years of education: Not on file      Highest education level: Not on file    Social Needs      [place] reviewed    No history of anesthetic complications   Airway   Mallampati: II  TM distance: >3 FB  Neck ROM: full  Dental - normal exam     Pulmonary - normal exam   (+) sleep apnea on CPAP,   Cardiovascular - normal exam  (+) hypertension well controlled,

## 2018-09-21 NOTE — H&P
History & Physical Examination    Patient Name: Artie Sena  MRN: [id number]  CSN: [id number]  YOB: 1940    Diagnosis: epigastric pain, GERD      Medications Prior to Admission:  Magnesium 100 MG Oral Tab Take by mouth.  Disp:  Rfl:  9/21/20 for incomplete evacuation Disp: 30 suppository Rfl: 12 Taking   DiphenhydrAMINE HCl 50 MG Oral Tab Take 1 tab 1 hour before procedure. Disp: 1 tablet Rfl: 0 Taking   Cinacalcet HCl (SENSIPAR) 30 MG Oral Tab Take 30 mg by mouth daily.  Disp:  Rfl:  Taking alternatives of the procedure with the patient/family. They understand and agree to proceed with plan of care. I have reviewed the History and Physical done within the last 30 days. Any changes noted above.   Jamarcus Hernandez MD  [facility]

## 2018-09-21 NOTE — BRIEF OP NOTE
Pre-Operative Diagnosis: Epigastric pain,Gastroesophageal reflux disease, esophagitis presence not specified      Post-Operative Diagnosis: Multiple large gastric polyps status post biopsies     Procedure Performed:   Procedure(s):  BILL Valentin

## 2018-09-21 NOTE — ANESTHESIA POSTPROCEDURE EVALUATION
Patient: Mojgan Simms    Procedure Summary     Date:  09/21/18 Room / Location:  [facility] ENDOSCOPY 05 / [facility] ENDOSCOPY    Anesthesia Start:  1846 Anesthesia Stop:  7504    Procedure:  ESOPHAGOGASTRODUODENOSCOPY (EGD) (N/A ) Diagnosis:       Epigastric pain

## 2018-09-22 VITALS
BODY MASS INDEX: 35.89 KG/M2 | OXYGEN SATURATION: 95 % | DIASTOLIC BLOOD PRESSURE: 66 MMHG | RESPIRATION RATE: 18 BRPM | SYSTOLIC BLOOD PRESSURE: 132 MMHG | WEIGHT: 265 LBS | HEIGHT: 72 IN | HEART RATE: 63 BPM

## 2018-09-22 NOTE — OPERATIVE REPORT
[facility]    PATIENT'S NAME: Larosio Hopsona   ATTENDING PHYSICIAN: Kacy Rick MD   OPERATING PHYSICIAN: Kacy Rick MD   PATIENT ACCOUNT#:   [id number]    LOCATION:  [facility] ROOM 6 [address] antral polyps and submitted separately as specimen container 1, and of the gastric body polyps as specimen container 2.   Retroflexion of the endoscope showed the aforementioned polyps, but the GE junction appeared normal.  The pylorus was normal.  4.   The

## 2018-09-24 ENCOUNTER — TELEPHONE (OUTPATIENT)
Dept: GASTROENTEROLOGY | Facility: CLINIC | Age: 78
End: 2018-09-24

## 2018-09-24 NOTE — TELEPHONE ENCOUNTER
I discussed with the patient the pathology results of adenomatous gastric polyps. He will need a repeat EGD with polypectomies    GI RN: Please schedule EGD with MAC at hospital diagnosis adenomatous gastric polyps; schedule within 4-6 weeks.

## 2018-09-28 ENCOUNTER — OFFICE VISIT (OUTPATIENT)
Dept: SURGERY | Facility: CLINIC | Age: 78
End: 2018-09-28
Payer: MEDICARE

## 2018-09-28 DIAGNOSIS — M54.16 LUMBAR RADICULOPATHY: Primary | ICD-10-CM

## 2018-09-28 DIAGNOSIS — M51.9 LUMBAR DISC DISEASE: ICD-10-CM

## 2018-09-28 DIAGNOSIS — M96.1 LUMBAR POST-LAMINECTOMY SYNDROME: ICD-10-CM

## 2018-09-28 PROCEDURE — 64483 NJX AA&/STRD TFRM EPI L/S 1: CPT | Performed by: PHYSICAL MEDICINE & REHABILITATION

## 2018-09-28 NOTE — PROCEDURES
[facility] [address].    LUMBAR TRANSFORAMINAL   NAME:  Margarette Macedo    MR #:    [id number] :  1940     PHYSICIAN:  Durwin Collet A. Couri        Operative Report    DATE OF PROCEDURE: 2018   PREOPERATIVE DIAGNOSES: 1. bilateral L5-S1 ra Then, aspiration was performed. No blood, fluid, or air was aspirated. Then, the patient was injected with a 4 cc solution of 2 cc of 40 mg/cc of Kenalog and 2 cc of 1% PF lidocaine without epinephrine. After this, the needle was removed.   The patient's

## 2018-09-29 ENCOUNTER — HOSPITAL ENCOUNTER (OUTPATIENT)
Dept: ULTRASOUND IMAGING | Facility: HOSPITAL | Age: 78
Discharge: HOME | End: 2018-09-29
Attending: UROLOGY
Payer: MEDICARE

## 2018-09-29 DIAGNOSIS — R33.9 URINARY RETENTION: ICD-10-CM

## 2018-09-29 DIAGNOSIS — N40.1 BENIGN PROSTATIC HYPERPLASIA WITH URINARY FREQUENCY: ICD-10-CM

## 2018-09-29 DIAGNOSIS — N32.0 BLADDER NECK OBSTRUCTION: ICD-10-CM

## 2018-09-29 DIAGNOSIS — R35.0 URINARY FREQUENCY: ICD-10-CM

## 2018-09-29 DIAGNOSIS — R35.0 BENIGN PROSTATIC HYPERPLASIA WITH URINARY FREQUENCY: ICD-10-CM

## 2018-09-29 PROCEDURE — 76857 US EXAM PELVIC LIMITED: CPT | Performed by: UROLOGY

## 2018-10-03 ENCOUNTER — TELEPHONE (OUTPATIENT)
Dept: SURGERY | Facility: CLINIC | Age: 78
End: 2018-10-03

## 2018-10-03 ENCOUNTER — OFFICE VISIT (OUTPATIENT)
Dept: SURGERY | Facility: CLINIC | Age: 78
End: 2018-10-03
Payer: MEDICARE

## 2018-10-03 VITALS
DIASTOLIC BLOOD PRESSURE: 74 MMHG | HEIGHT: 72 IN | HEART RATE: 74 BPM | SYSTOLIC BLOOD PRESSURE: 120 MMHG | BODY MASS INDEX: 35.62 KG/M2 | RESPIRATION RATE: 16 BRPM | WEIGHT: 263 LBS

## 2018-10-03 DIAGNOSIS — N40.1 BENIGN PROSTATIC HYPERPLASIA WITH URINARY FREQUENCY: Primary | ICD-10-CM

## 2018-10-03 DIAGNOSIS — N19 RENAL FAILURE, UNSPECIFIED CHRONICITY: ICD-10-CM

## 2018-10-03 DIAGNOSIS — Z79.82 ASPIRIN LONG-TERM USE: ICD-10-CM

## 2018-10-03 DIAGNOSIS — R35.0 URINARY FREQUENCY: ICD-10-CM

## 2018-10-03 DIAGNOSIS — N39.41 URGE INCONTINENCE: ICD-10-CM

## 2018-10-03 DIAGNOSIS — R35.0 BENIGN PROSTATIC HYPERPLASIA WITH URINARY FREQUENCY: Primary | ICD-10-CM

## 2018-10-03 PROCEDURE — G0463 HOSPITAL OUTPT CLINIC VISIT: HCPCS | Performed by: UROLOGY

## 2018-10-03 PROCEDURE — 99215 OFFICE O/P EST HI 40 MIN: CPT | Performed by: UROLOGY

## 2018-10-03 NOTE — TELEPHONE ENCOUNTER
pt had appt 10-3-18.  scheduled for a ultra sound Friday 10-5-18. Pt advised to schedule appt with pvk 2 - 3 days after the ultra sound. No appts available.   Please call pt to advise

## 2018-10-03 NOTE — PATIENT INSTRUCTIONS
1.   Schedule transrectal ultrasound of the prostate (NO BIOPSY) at [facility] nurses will advise you on scheduling it    2.   Please return to the office after ultrasound of the prostate for likely one last discussion before laser ablat

## 2018-10-03 NOTE — PROGRESS NOTES
HPI:    Patient ID: Christian Colindres is a 70year old male. HPI    Pt hx provided by pt and cousin, Olya Veliz. BPH  Chronic. This is a chronic condition. The patient is not taking any Avodart or Finasteride or any herbal medications.   He denies any associa 6/5/17 Dr. Edward Neff MD: Garner Hodgkin also recommend that you get an appointment with a urologist regarding you CAT scan results of enlarged prostate indenting the bladder. \" Pt on acetaminophen 325 MG, aspirin 81 MG and spironolactone 25 MG. 07/03/2017 office Performed by Shantelle Munoz MD at [facility] MAIN OR  8/20/2015: LUMBAR INTERLAMINAR EPIDURAL INJECTION; N/A      Comment:  Performed by Shantelle Munoz MD at [facility] MAIN OR   Family History   Problem Relation Age of Onset   • Other (Other) Father         Natural Tab Take 1 tablet by mouth as needed. Disp:  Rfl:    bisacodyl (DULCOLAX) 10 MG Rectal Suppos Place 1 suppository (10 mg total) rectally daily. Disp: 30 suppository Rfl: 2   ALPRAZolam 0.5 MG Oral Tab Take 0.5 mg by mouth nightly as needed for Sleep.  Disp: dextroscoliosis    2/15/18: US Kidney: Multiple cysts seen bilaterally; right 3.28; left 9.73     5/6/17 CT abd + pelvis without contrast, indications = bilateral lower quadrant pain and pelvic pain with constipation: kidneys = low density probable cysts p US Bladder: Post void volume: 15 mL, good bladder emptying; please see below.     (R35.0) Urinary frequency  Plan: Patient has current AUA score of 33, severe voiding dysfunction category, worse compared to previous score of 28, severe voiding dysfunction & Referrals:  None       ID#[id number]    By signing my name below, Margaret Crouch,  attest that this documentation has been prepared under the direction and in the presence of Cathy Sarabia MD.   Electronically Signed:  Phuong Kaplan, 10/3/2018, 8:06 AM.

## 2018-10-12 NOTE — TELEPHONE ENCOUNTER
Spoke with patient advised no sooner appointment to keep scheduled appointment for 11/30 with [facility] will place on wait list. Paloma verbalized understanding will keep appointment.

## 2018-10-31 ENCOUNTER — OFFICE VISIT (OUTPATIENT)
Dept: NEUROLOGY | Facility: CLINIC | Age: 78
End: 2018-10-31
Payer: MEDICARE

## 2018-10-31 VITALS
SYSTOLIC BLOOD PRESSURE: 108 MMHG | HEIGHT: 72 IN | DIASTOLIC BLOOD PRESSURE: 64 MMHG | BODY MASS INDEX: 35.62 KG/M2 | WEIGHT: 263 LBS | HEART RATE: 66 BPM | RESPIRATION RATE: 16 BRPM

## 2018-10-31 DIAGNOSIS — G89.29 CHRONIC MIDLINE LOW BACK PAIN WITHOUT SCIATICA: ICD-10-CM

## 2018-10-31 DIAGNOSIS — M96.1 LUMBAR POST-LAMINECTOMY SYNDROME: ICD-10-CM

## 2018-10-31 DIAGNOSIS — M48.061 LUMBAR FORAMINAL STENOSIS: ICD-10-CM

## 2018-10-31 DIAGNOSIS — M51.9 LUMBAR DISC DISEASE: ICD-10-CM

## 2018-10-31 DIAGNOSIS — M75.111 INCOMPLETE TEAR OF RIGHT ROTATOR CUFF: ICD-10-CM

## 2018-10-31 DIAGNOSIS — M51.26 LUMBAR HERNIATED DISC: ICD-10-CM

## 2018-10-31 DIAGNOSIS — M19.011 ARTHRITIS OF RIGHT ACROMIOCLAVICULAR JOINT: ICD-10-CM

## 2018-10-31 DIAGNOSIS — M48.062 SPINAL STENOSIS OF LUMBAR REGION WITH NEUROGENIC CLAUDICATION: ICD-10-CM

## 2018-10-31 DIAGNOSIS — M54.16 LUMBAR RADICULOPATHY: Primary | ICD-10-CM

## 2018-10-31 DIAGNOSIS — M54.50 CHRONIC MIDLINE LOW BACK PAIN WITHOUT SCIATICA: ICD-10-CM

## 2018-10-31 PROCEDURE — 99214 OFFICE O/P EST MOD 30 MIN: CPT | Performed by: PHYSICAL MEDICINE & REHABILITATION

## 2018-10-31 NOTE — PROGRESS NOTES
Low Back Pain H & P    Chief Complaint: Patient presents with:  Low Back Pain: Patient presents for follow up on low back pain, had left L5 transforaminal epidural steroid injection on 9/28/18. Patient states he got 100% relief from the injection.  Pt c/o l Aneurysm       Social History   Social History    Socioeconomic History      Marital status: Single      Spouse name: Not on file      Number of children: Not on file      Years of education: Not on file      Highest education level: Not on file    Social lesions.     Vitals:   10/31/18  1113   BP: 108/64   Pulse: 66   Resp: 16       Gait:    Gait: Normal gait   Sit to Stand: no difficulty      Lumbar Spine:    Scoliosis: No scoliosis present     Lumbar Spine Palpation:    Spinous Processes: Non-tender for a MD  10/31/2018

## 2018-10-31 NOTE — PATIENT INSTRUCTIONS
Plan  He will continue with his current activities. The patient does not need any pain medications at this time. The patient does not need any injections at this time. He will follow up in 6 months or sooner if needed.

## 2018-12-07 ENCOUNTER — HOSPITAL ENCOUNTER (OUTPATIENT)
Dept: ULTRASOUND IMAGING | Facility: HOSPITAL | Age: 78
Discharge: HOME | End: 2018-12-07
Attending: UROLOGY
Payer: MEDICARE

## 2018-12-07 DIAGNOSIS — N40.1 BENIGN PROSTATIC HYPERPLASIA WITH URINARY FREQUENCY: ICD-10-CM

## 2018-12-07 DIAGNOSIS — R35.0 URINARY FREQUENCY: ICD-10-CM

## 2018-12-07 DIAGNOSIS — N39.41 URGE INCONTINENCE: ICD-10-CM

## 2018-12-07 DIAGNOSIS — R35.0 BENIGN PROSTATIC HYPERPLASIA WITH URINARY FREQUENCY: ICD-10-CM

## 2018-12-07 PROCEDURE — 76872 US TRANSRECTAL: CPT | Performed by: UROLOGY

## 2019-01-05 NOTE — TELEPHONE ENCOUNTER
Please call patient and schedule EGD with MAC at hospital within 4-6 weeks as requested in September. After scheduling you may close the encounter.

## 2019-03-03 PROBLEM — M19.011 PRIMARY LOCALIZED OSTEOARTHROSIS OF RIGHT SHOULDER: Status: ACTIVE | Noted: 2019-03-03

## 2019-03-13 ENCOUNTER — TELEPHONE (OUTPATIENT)
Dept: GASTROENTEROLOGY | Facility: CLINIC | Age: 79
End: 2019-03-13

## 2019-03-13 NOTE — TELEPHONE ENCOUNTER
I spoke to the pt and he accepted an appt with Dr Dayana Phoenix.  Date, time and location verified    Future Appointments   Date Time Provider Ulices May   4/3/2019 10:40 AM [facility] Bl[facility]   4/24/2019 10:10 AM Lauren Manzo MD [facility]

## 2019-03-13 NOTE — TELEPHONE ENCOUNTER
Contacted pt and informed him we would need to reschedule OV with Dr. Narcisa Felix on 4/3/19. Offered an appointment with another provider and pt did not want to schedule with a different provider.      FYI - RN's

## 2019-05-24 ENCOUNTER — TELEPHONE (OUTPATIENT)
Dept: GASTROENTEROLOGY | Facility: CLINIC | Age: 79
End: 2019-05-24

## 2019-05-24 NOTE — TELEPHONE ENCOUNTER
Patient states he was advised by Dr. Brigette Prado to have a colonoscopy and EGD done as soon as possible due to new diagnosis of anemia.   Appointment made with Dr. Jessica Narvaez on  Tuesday May 28th at 1:45 pm.

## 2019-05-24 NOTE — TELEPHONE ENCOUNTER
Pt states his pcp Dr Yonatan Eid [phone] wants him to have a [facility] EGD and pt is ready to be scheduled pt is scheduled to see sony 06/26/19     Pt states he had both test completed by dr Carlota Guevara in 2017

## 2019-05-28 ENCOUNTER — TELEPHONE (OUTPATIENT)
Dept: GASTROENTEROLOGY | Facility: CLINIC | Age: 79
End: 2019-05-28

## 2019-05-28 ENCOUNTER — OFFICE VISIT (OUTPATIENT)
Dept: GASTROENTEROLOGY | Facility: CLINIC | Age: 79
End: 2019-05-28
Payer: MEDICARE

## 2019-05-28 VITALS
HEIGHT: 71 IN | HEART RATE: 60 BPM | DIASTOLIC BLOOD PRESSURE: 62 MMHG | WEIGHT: 271 LBS | SYSTOLIC BLOOD PRESSURE: 104 MMHG | BODY MASS INDEX: 37.94 KG/M2

## 2019-05-28 DIAGNOSIS — D64.9 ANEMIA, UNSPECIFIED TYPE: Primary | ICD-10-CM

## 2019-05-28 PROCEDURE — 99214 OFFICE O/P EST MOD 30 MIN: CPT | Performed by: INTERNAL MEDICINE

## 2019-05-28 PROCEDURE — G0463 HOSPITAL OUTPT CLINIC VISIT: HCPCS | Performed by: INTERNAL MEDICINE

## 2019-05-28 RX ORDER — TOLTERODINE 4 MG/1
4 CAPSULE, EXTENDED RELEASE ORAL DAILY
Refills: 0 | COMMUNITY
Start: 2018-12-12

## 2019-05-28 RX ORDER — TRAZODONE HYDROCHLORIDE 100 MG/1
50 TABLET ORAL NIGHTLY
Status: ON HOLD | COMMUNITY
End: 2020-01-08

## 2019-05-28 RX ORDER — CHLORAL HYDRATE 500 MG
1000 CAPSULE ORAL DAILY
COMMUNITY

## 2019-05-28 RX ORDER — TAMSULOSIN HYDROCHLORIDE 0.4 MG/1
0.4 CAPSULE ORAL NIGHTLY
COMMUNITY
Start: 2018-12-12

## 2019-05-28 NOTE — TELEPHONE ENCOUNTER
Scheduled for:  Colonoscopy [address] [facility]  Provider Name: Dr Leatha Daly  Date:  Fri 8/30/19  Location:  [facility]  Sedation:  MAC  Time:  7:30 am  Prep: split dose colyte  Meds/Allergies Reconciled?:  Latex, radiology contrast dye  Diagnosis with codes:  Anemia D64

## 2019-05-28 NOTE — PROGRESS NOTES
Marcin Maldonado is a 70year old male. HPI:   Patient presents with:  Anemia    The patient is a 73-year-old male who has a history depression, hypertension, sleep apnea, postoperative nausea/vomiting who presents for evaluation of anemia.   Patient denies Comment: Occasional glass of wine    Drug use: No       Medications (Active prior to today's visit):    Current Outpatient Medications:  tamsulosin HCl 0.4 MG Oral Cap Take 0.4 mg by mouth.  Disp:  Rfl:    PEG 3350-KCl-NaBcb-NaCl-NaSulf (COLYTE WITH FLAVOR mouth daily. Disp:  Rfl:    Methylcellulose, Laxative, 500 MG Oral Tab Take 1 tablet by mouth as needed.  Disp:  Rfl:        Allergies:    Latex                   RASH  Radiology Contrast *    RASH      ROS:   The patient denies any chest pain or shortness

## 2019-08-30 ENCOUNTER — ANESTHESIA (OUTPATIENT)
Dept: ENDOSCOPY | Facility: HOSPITAL | Age: 79
End: 2019-08-30
Payer: MEDICARE

## 2019-08-30 ENCOUNTER — ANESTHESIA EVENT (OUTPATIENT)
Dept: ENDOSCOPY | Facility: HOSPITAL | Age: 79
End: 2019-08-30
Payer: MEDICARE

## 2019-08-30 ENCOUNTER — HOSPITAL ENCOUNTER (OUTPATIENT)
Facility: HOSPITAL | Age: 79
Setting detail: HOSPITAL OUTPATIENT SURGERY
Discharge: HOME | End: 2019-08-30
Attending: INTERNAL MEDICINE | Admitting: INTERNAL MEDICINE
Payer: MEDICARE

## 2019-08-30 DIAGNOSIS — D64.9 ANEMIA, UNSPECIFIED TYPE: ICD-10-CM

## 2019-08-30 PROCEDURE — 45380 COLONOSCOPY AND BIOPSY: CPT | Performed by: INTERNAL MEDICINE

## 2019-08-30 PROCEDURE — 0DB68ZX EXCISION OF STOMACH, VIA NATURAL OR ARTIFICIAL OPENING ENDOSCOPIC, DIAGNOSTIC: ICD-10-PCS | Performed by: INTERNAL MEDICINE

## 2019-08-30 PROCEDURE — 0DBK8ZX EXCISION OF ASCENDING COLON, VIA NATURAL OR ARTIFICIAL OPENING ENDOSCOPIC, DIAGNOSTIC: ICD-10-PCS | Performed by: INTERNAL MEDICINE

## 2019-08-30 PROCEDURE — 0DBH8ZX EXCISION OF CECUM, VIA NATURAL OR ARTIFICIAL OPENING ENDOSCOPIC, DIAGNOSTIC: ICD-10-PCS | Performed by: INTERNAL MEDICINE

## 2019-08-30 PROCEDURE — 45385 COLONOSCOPY W/LESION REMOVAL: CPT | Performed by: INTERNAL MEDICINE

## 2019-08-30 PROCEDURE — 0DBN8ZX EXCISION OF SIGMOID COLON, VIA NATURAL OR ARTIFICIAL OPENING ENDOSCOPIC, DIAGNOSTIC: ICD-10-PCS | Performed by: INTERNAL MEDICINE

## 2019-08-30 PROCEDURE — 43251 EGD REMOVE LESION SNARE: CPT | Performed by: INTERNAL MEDICINE

## 2019-08-30 RX ORDER — SODIUM CHLORIDE, SODIUM LACTATE, POTASSIUM CHLORIDE, CALCIUM CHLORIDE 600; 310; 30; 20 MG/100ML; MG/100ML; MG/100ML; MG/100ML
INJECTION, SOLUTION INTRAVENOUS CONTINUOUS
Status: DISCONTINUED | OUTPATIENT
Start: 2019-08-30 | End: 2019-08-30

## 2019-08-30 RX ORDER — NALOXONE HYDROCHLORIDE 0.4 MG/ML
80 INJECTION, SOLUTION INTRAMUSCULAR; INTRAVENOUS; SUBCUTANEOUS AS NEEDED
Status: DISCONTINUED | OUTPATIENT
Start: 2019-08-30 | End: 2019-08-30

## 2019-08-30 RX ORDER — LIDOCAINE HYDROCHLORIDE 10 MG/ML
INJECTION, SOLUTION EPIDURAL; INFILTRATION; INTRACAUDAL; PERINEURAL AS NEEDED
Status: DISCONTINUED | OUTPATIENT
Start: 2019-08-30 | End: 2019-08-30 | Stop reason: SURG

## 2019-08-30 RX ORDER — ONDANSETRON 2 MG/ML
4 INJECTION INTRAMUSCULAR; INTRAVENOUS ONCE AS NEEDED
Status: DISCONTINUED | OUTPATIENT
Start: 2019-08-30 | End: 2019-08-30

## 2019-08-30 RX ADMIN — SODIUM CHLORIDE, SODIUM LACTATE, POTASSIUM CHLORIDE, CALCIUM CHLORIDE: 600; 310; 30; 20 INJECTION, SOLUTION INTRAVENOUS at 08:58:00

## 2019-08-30 RX ADMIN — LIDOCAINE HYDROCHLORIDE 80 MG: 10 INJECTION, SOLUTION EPIDURAL; INFILTRATION; INTRACAUDAL; PERINEURAL at 07:46:00

## 2019-08-30 NOTE — ANESTHESIA PREPROCEDURE EVALUATION
Anesthesia PreOp Note    HPI:     Mojgan Simms is a 70year old male who presents for preoperative consultation requested by: Heide Aggarwal MD    Date of Surgery: 8/30/2019    Procedure(s):  COLONOSCOPY  ESOPHAGOGASTRODUODENOSCOPY (EGD)  Indication: A Certified letter [phone] returned unclaimed.   bilateral mod, L3-4 left mild foraminal stenosis         Date Noted: 01/20/2016      L2-3 left mild-mod/right mild bulging disc & central annular tear, L3-4 mod diffuse bulging disc, L4-5 left > right mod diffuse, L5-S1 left mild-mod foraminal bulging disc Disp:  Rfl:  Taking   Methylcellulose, Laxative, Oral Powder Take by mouth. Disp:  Rfl:  Taking   Tolterodine Tartrate ER 4 MG Oral Capsule SR 24 Hr TK ONE C PO D Disp:  Rfl: 0 Taking   traZODone HCl 100 MG Oral Tab Take 50 mg by mouth.  Disp:  Rfl:  8/29/2 Cholecalciferol 400 UNITS Oral Tab Take 1 tablet by mouth daily. Disp:  Rfl:  Not Taking   Methylcellulose, Laxative, 500 MG Oral Tab Take 1 tablet by mouth as needed.  Disp:  Rfl:  Not Taking   cyanocobalamin 1000 MCG Oral Tab Take 1,000 mcg by mouth afua partner violence:        Fear of current or ex partner: Not on file        Emotionally abused: Not on file        Physically abused: Not on file        Forced sexual activity: Not on file    Other Topics      Concerns:         Service: Not Asked Sisto and/or legal guardian or family member of the nature of the anesthetic plan, benefits, risks including possible dental damage if relevant, major complications, and any alternative forms of anesthetic management.    All of the patient's questions were

## 2019-08-30 NOTE — ANESTHESIA POSTPROCEDURE EVALUATION
Patient: Carl Way    Procedure Summary     Date:  08/30/19 Room / Location:  [address] [place] [address] ENDOSCOPY 01 / [address] [place] [address] ENDOSCOPY    Anesthesia Start:  4892 Anesthesia Stop:  1150    Procedures:       COLONOSCOPY (N/A )      ESOPHAGOGASTRODUODENOSCOPY (EGD) (N/A ) Steven Andres

## 2019-08-30 NOTE — OPERATIVE REPORT
[facility] Endoscopy Report      Preoperative Diagnosis:  - anemia      Postoperative Diagnosis:  - colon polyps x 5  - internal hemorrhoids  - gastric polyps       Procedure:    Colonoscopy   Esophagogastroduodenoscopy       Surgeon:  Rose Alva diaphragmatic impression were at 40 cm. The stomach distended appropriately with insufflated air. The mucosa of the stomach showed gastric polyps throughout the stomach, many of these were 5 mm or less in size and had fundic gland appearance.   However th

## 2019-08-30 NOTE — H&P
History & Physical Examination    Patient Name: Kathia Willard  MRN: [id number]  CSN: [id number]  YOB: 1940    Diagnosis: anemia        Medications Prior to Admission:  tamsulosin HCl 0.4 MG Oral Cap Take 0.4 mg by mouth.  Disp:  Rfl:  8/29/201 Chloride ER (K-DUR) 10 MEQ Oral Tab CR Take 10 mEq by mouth 2 (two) times daily. Disp:  Rfl:  Unknown   Pravastatin Sodium (PRAVACHOL) 40 MG Oral Tab Take 20 mg by mouth nightly.  Disp:  Rfl:  8/29/2019 at 2100   aspirin 81 MG Oral Tab Take 81 mg by mouth Natural Causes   • Other (Other) Mother         Aneurysm     Social History    Tobacco Use      Smoking status: Never Smoker      Smokeless tobacco: Never Used    Alcohol use:  Yes      Alcohol/week: 0.0 standard drinks      Comment: Occasional glass of win

## 2019-08-31 VITALS
BODY MASS INDEX: 35.62 KG/M2 | DIASTOLIC BLOOD PRESSURE: 69 MMHG | HEIGHT: 72 IN | RESPIRATION RATE: 14 BRPM | WEIGHT: 263 LBS | OXYGEN SATURATION: 97 % | SYSTOLIC BLOOD PRESSURE: 111 MMHG | HEART RATE: 62 BPM

## 2019-09-03 ENCOUNTER — TELEPHONE (OUTPATIENT)
Dept: GASTROENTEROLOGY | Facility: CLINIC | Age: 79
End: 2019-09-03

## 2019-09-04 NOTE — TELEPHONE ENCOUNTER
----- Message from Diallo Barajas MD sent at 9/3/2019  5:37 PM CDT -----  msg left on vm for pt to call to review results. RN to place on call back for EGD for 6 months.    Repeat colonoscopy in 3 years call back

## 2019-09-04 NOTE — TELEPHONE ENCOUNTER
Dr Vandana Lowe, pt may be reached at [phone]. Thanks. Please close encounter when done. EGD recall entered for 3/1//2020 and  colonoscopy recall entered for 8/30/2022.

## 2019-09-05 NOTE — TELEPHONE ENCOUNTER
msg left on vm, the patient had gastric polyps which were removed, he does have also polyps in other parts of the stomach which I plan on removing in 6 months time.   I had an interesting idea and that there appeared to be a mild erythema or inflammation du

## 2019-09-16 NOTE — TELEPHONE ENCOUNTER
I have left msg for pt to call back  RN to review below with the pt, please have follow up in the office to discuss below.

## 2019-09-17 NOTE — TELEPHONE ENCOUNTER
No - likely has had this for years and or decades. From my research on this he may have picked up at some point in his life. Transmission not likely but good hand washing.

## 2019-09-17 NOTE — TELEPHONE ENCOUNTER
Dr Atiya Glover, all below reviewed with pt--he is having prostate surgery 9/26. Accepted OV with you Tues 10/8 to further discuss, although is agreeable to treatment, arrival 4:15pm and given directions to [facility] [employer] office. Please close encounter. Thanks.     Rodney

## 2019-09-17 NOTE — TELEPHONE ENCOUNTER
Noted, will also attempt to reach pt. Dr Carlin Bob, is this bacteria transmitted orally/GI--are there any precautions for family members? Thanks.

## 2019-09-25 ENCOUNTER — HOSPITAL ENCOUNTER (OUTPATIENT)
Dept: GENERAL RADIOLOGY | Age: 79
Discharge: HOME | End: 2019-09-25
Attending: PHYSICAL MEDICINE & REHABILITATION
Payer: MEDICARE

## 2019-09-25 ENCOUNTER — TELEPHONE (OUTPATIENT)
Dept: NEUROLOGY | Facility: CLINIC | Age: 79
End: 2019-09-25

## 2019-09-25 ENCOUNTER — OFFICE VISIT (OUTPATIENT)
Dept: NEUROLOGY | Facility: CLINIC | Age: 79
End: 2019-09-25
Payer: MEDICARE

## 2019-09-25 VITALS
BODY MASS INDEX: 37.94 KG/M2 | WEIGHT: 271 LBS | DIASTOLIC BLOOD PRESSURE: 58 MMHG | SYSTOLIC BLOOD PRESSURE: 96 MMHG | RESPIRATION RATE: 17 BRPM | HEIGHT: 71 IN | HEART RATE: 76 BPM

## 2019-09-25 DIAGNOSIS — M96.1 LUMBAR POST-LAMINECTOMY SYNDROME: ICD-10-CM

## 2019-09-25 DIAGNOSIS — M25.561 RIGHT MEDIAL KNEE PAIN: ICD-10-CM

## 2019-09-25 DIAGNOSIS — M19.011 PRIMARY LOCALIZED OSTEOARTHROSIS OF RIGHT SHOULDER: ICD-10-CM

## 2019-09-25 DIAGNOSIS — G89.29 CHRONIC MIDLINE LOW BACK PAIN WITHOUT SCIATICA: ICD-10-CM

## 2019-09-25 DIAGNOSIS — R07.89 RIGHT-SIDED CHEST WALL PAIN: ICD-10-CM

## 2019-09-25 DIAGNOSIS — M75.41 IMPINGEMENT SYNDROME OF RIGHT SHOULDER: ICD-10-CM

## 2019-09-25 DIAGNOSIS — M48.062 SPINAL STENOSIS OF LUMBAR REGION WITH NEUROGENIC CLAUDICATION: ICD-10-CM

## 2019-09-25 DIAGNOSIS — M75.111 NONTRAUMATIC INCOMPLETE TEAR OF RIGHT ROTATOR CUFF: ICD-10-CM

## 2019-09-25 DIAGNOSIS — M51.26 LUMBAR HERNIATED DISC: ICD-10-CM

## 2019-09-25 DIAGNOSIS — M50.90 CERVICAL DISC DISEASE: ICD-10-CM

## 2019-09-25 DIAGNOSIS — M25.561 RIGHT MEDIAL KNEE PAIN: Primary | ICD-10-CM

## 2019-09-25 DIAGNOSIS — M48.02 FORAMINAL STENOSIS OF CERVICAL REGION: ICD-10-CM

## 2019-09-25 DIAGNOSIS — M25.511 ACUTE PAIN OF RIGHT SHOULDER: ICD-10-CM

## 2019-09-25 DIAGNOSIS — M48.061 LUMBAR FORAMINAL STENOSIS: ICD-10-CM

## 2019-09-25 DIAGNOSIS — M54.50 CHRONIC MIDLINE LOW BACK PAIN WITHOUT SCIATICA: ICD-10-CM

## 2019-09-25 DIAGNOSIS — M51.9 LUMBAR DISC DISEASE: ICD-10-CM

## 2019-09-25 DIAGNOSIS — M19.011 ARTHRITIS OF RIGHT ACROMIOCLAVICULAR JOINT: ICD-10-CM

## 2019-09-25 DIAGNOSIS — M67.912 DYSFUNCTION OF LEFT ROTATOR CUFF: ICD-10-CM

## 2019-09-25 PROCEDURE — 99214 OFFICE O/P EST MOD 30 MIN: CPT | Performed by: PHYSICAL MEDICINE & REHABILITATION

## 2019-09-25 PROCEDURE — 73562 X-RAY EXAM OF KNEE 3: CPT | Performed by: PHYSICAL MEDICINE & REHABILITATION

## 2019-09-25 NOTE — PATIENT INSTRUCTIONS
Plan  He will get into PT for the right shoulder and chest wall and the right knee. He will get a right knee x-ray. He will drop off the lumbar spine x-ray that he had in the past.    He will continue with the Ultram for the pain.     He will follow

## 2019-09-25 NOTE — PROGRESS NOTES
Cervical Pain H & P    Chief Complaint:  Patient presents with:  Low Back Pain: LOV: 10/31/18 - Bilateral intermittent LBP, worsens with walking, notes increase in Right knee buckling - nervous it will cause fall.  Aleve PRN w/ relief, takes only as needed, Past Medical History   Past Medical History:   Diagnosis Date   • Anemia    • Cancer ([facility] [address]     skin cancer per pt   • Depression    • Esophageal reflux    • Essential hypertension    • High blood pressure    • High cholesterol    • Hyperlipidemia    • needs: Medical: Not on file        Non-medical: Not on file    Tobacco Use      Smoking status: Never Smoker      Smokeless tobacco: Never Used    Substance and Sexual Activity      Alcohol use:  Yes        Alcohol/week: 0.0 standard drinks        Co a cough. HEENT:  Extraocular muscles are intact. There is no kern icterus. Pupils are equal, round, and reactive to light. No redness or discharge bilaterally. Skin:  There are no rashes or lesions. Lymph Nodes:   The patient has no palpable subman anterior drawer sign, or posterior drawer sign     Assessment  1. Right medial knee pain    2.  Primary localized osteoarthrosis of right shoulder: mild    3. right supraspinatus moderate full thickness tear, right subscapularis mild-moderate inferior full

## 2019-09-30 NOTE — TELEPHONE ENCOUNTER
Patient informed of the imaging results as interpreted by Dr. Gilmar Cutler.  Patient verbalized understanding

## 2019-10-08 ENCOUNTER — OFFICE VISIT (OUTPATIENT)
Dept: GASTROENTEROLOGY | Facility: CLINIC | Age: 79
End: 2019-10-08
Payer: MEDICARE

## 2019-10-08 VITALS
HEART RATE: 60 BPM | SYSTOLIC BLOOD PRESSURE: 100 MMHG | WEIGHT: 270.63 LBS | DIASTOLIC BLOOD PRESSURE: 59 MMHG | HEIGHT: 71 IN | BODY MASS INDEX: 37.89 KG/M2

## 2019-10-08 DIAGNOSIS — K31.7 GASTRIC POLYPS: ICD-10-CM

## 2019-10-08 DIAGNOSIS — D64.9 ANEMIA, UNSPECIFIED TYPE: Primary | ICD-10-CM

## 2019-10-08 DIAGNOSIS — Z86.010 HISTORY OF COLON POLYPS: ICD-10-CM

## 2019-10-08 PROCEDURE — 99213 OFFICE O/P EST LOW 20 MIN: CPT | Performed by: INTERNAL MEDICINE

## 2019-10-08 RX ORDER — CIPROFLOXACIN 500 MG/1
500 TABLET, FILM COATED ORAL 2 TIMES DAILY
Qty: 14 TABLET | Refills: 0 | Status: SHIPPED | OUTPATIENT
Start: 2019-10-08 | End: 2019-12-22 | Stop reason: ALTCHOICE

## 2019-10-08 RX ORDER — METRONIDAZOLE 500 MG/1
500 TABLET ORAL EVERY 8 HOURS
Qty: 21 TABLET | Refills: 0 | Status: SHIPPED | OUTPATIENT
Start: 2019-10-08 | End: 2019-12-22 | Stop reason: ALTCHOICE

## 2019-10-08 NOTE — PROGRESS NOTES
Gopal Ruiz is a 70year old male. HPI:   Patient presents with: Follow - Up    The patient is a 68-year-old male seen in follow-up today.   He has a history of colon polyps x5 removed recently during colonoscopy and upper endoscopy showed notable gas Tobacco Use      Smoking status: Never Smoker      Smokeless tobacco: Never Used    Alcohol use:  Yes      Alcohol/week: 0.0 standard drinks      Comment: Occasional glass of wine    Drug use: No       Medications (Active prior to today's visit):    Current Take 20 mg by mouth daily. Disp:  Rfl:    Potassium Chloride ER (K-DUR) 10 MEQ Oral Tab CR Take 10 mEq by mouth 2 (two) times daily. Disp:  Rfl:    Pravastatin Sodium (PRAVACHOL) 40 MG Oral Tab Take 20 mg by mouth nightly.  Disp:  Rfl:    aspirin 81 MG Or patient will be 82 years at that time would likely be his last colonoscopy. All questions were answered and patient agrees to the above plan. Orders This Visit:  No orders of the defined types were placed in this encounter.       Meds This Visit:

## 2019-12-22 ENCOUNTER — APPOINTMENT (OUTPATIENT)
Dept: GENERAL RADIOLOGY | Facility: HOSPITAL | Age: 79
DRG: 856 | End: 2019-12-22
Attending: EMERGENCY MEDICINE
Payer: MEDICARE

## 2019-12-22 ENCOUNTER — HOSPITAL ENCOUNTER (INPATIENT)
Facility: HOSPITAL | Age: 79
LOS: 17 days | Discharge: NURSING, REHAB OR LONG-TERM CARE | DRG: 856 | End: 2020-01-08
Attending: EMERGENCY MEDICINE | Admitting: FAMILY MEDICINE
Payer: MEDICARE

## 2019-12-22 DIAGNOSIS — A41.9 SEVERE SEPSIS (HCC): ICD-10-CM

## 2019-12-22 DIAGNOSIS — M54.50 MIDLINE LOW BACK PAIN WITHOUT SCIATICA: ICD-10-CM

## 2019-12-22 DIAGNOSIS — N30.01 ACUTE CYSTITIS WITH HEMATURIA: Primary | ICD-10-CM

## 2019-12-22 DIAGNOSIS — R65.20 SEVERE SEPSIS (HCC): ICD-10-CM

## 2019-12-22 PROBLEM — R65.10 SIRS (SYSTEMIC INFLAMMATORY RESPONSE SYNDROME) (HCC): Status: ACTIVE | Noted: 2019-12-22

## 2019-12-22 LAB
ANION GAP SERPL CALC-SCNC: 8 MMOL/L (ref 0–18)
BASOPHILS # BLD: 0 X10(3) UL (ref 0–0.2)
BASOPHILS NFR BLD: 0 %
BILIRUB UR QL: NEGATIVE
BUN BLD-MCNC: 38 MG/DL (ref 7–18)
BUN/CREAT SERPL: 11.3 (ref 10–20)
CALCIUM BLD-MCNC: 8 MG/DL (ref 8.5–10.1)
CHLORIDE SERPL-SCNC: 103 MMOL/L (ref 98–112)
CO2 SERPL-SCNC: 23 MMOL/L (ref 21–32)
COLOR UR: YELLOW
CREAT BLD-MCNC: 3.35 MG/DL (ref 0.7–1.3)
DEPRECATED RDW RBC AUTO: 54.4 FL (ref 35.1–46.3)
EOSINOPHIL # BLD: 0 X10(3) UL (ref 0–0.7)
EOSINOPHIL NFR BLD: 0 %
ERYTHROCYTE [DISTWIDTH] IN BLOOD BY AUTOMATED COUNT: 16.8 % (ref 11–15)
GLUCOSE BLD-MCNC: 113 MG/DL (ref 70–99)
GLUCOSE UR-MCNC: NEGATIVE MG/DL
HCT VFR BLD AUTO: 43.1 % (ref 39–53)
HGB BLD-MCNC: 14 G/DL (ref 13–17.5)
KETONES UR-MCNC: NEGATIVE MG/DL
LACTATE SERPL-SCNC: 2.1 MMOL/L (ref 0.4–2)
LACTATE SERPL-SCNC: 2.2 MMOL/L (ref 0.4–2)
LACTATE SERPL-SCNC: 3 MMOL/L (ref 0.4–2)
LYMPHOCYTES NFR BLD: 2.27 X10(3) UL (ref 1–4)
LYMPHOCYTES NFR BLD: 9 %
MCH RBC QN AUTO: 29 PG (ref 26–34)
MCHC RBC AUTO-ENTMCNC: 32.5 G/DL (ref 31–37)
MCV RBC AUTO: 89.2 FL (ref 80–100)
MONOCYTES # BLD: 1.01 X10(3) UL (ref 0.1–1)
MONOCYTES NFR BLD: 4 %
MORPHOLOGY: NORMAL
MRSA DNA SPEC QL NAA+PROBE: NEGATIVE
NEUTROPHILS # BLD AUTO: 21.69 X10 (3) UL (ref 1.5–7.7)
NEUTROPHILS NFR BLD: 75 %
NEUTS BAND NFR BLD: 12 %
NEUTS HYPERSEG # BLD: 21.92 X10(3) UL (ref 1.5–7.7)
NITRITE UR QL STRIP.AUTO: NEGATIVE
OSMOLALITY SERPL CALC.SUM OF ELEC: 288 MOSM/KG (ref 275–295)
PH UR: 5 [PH] (ref 5–8)
PLATELET # BLD AUTO: 239 10(3)UL (ref 150–450)
PLATELET MORPHOLOGY: NORMAL
POTASSIUM SERPL-SCNC: 4.3 MMOL/L (ref 3.5–5.1)
PROT UR-MCNC: 100 MG/DL
RBC # BLD AUTO: 4.83 X10(6)UL (ref 3.8–5.8)
RBC #/AREA URNS AUTO: 235 /HPF
SODIUM SERPL-SCNC: 134 MMOL/L (ref 136–145)
SP GR UR STRIP: 1.02 (ref 1–1.03)
TOTAL CELLS COUNTED: 100
UROBILINOGEN UR STRIP-ACNC: <2
WBC # BLD AUTO: 25.2 X10(3) UL (ref 4–11)
WBC #/AREA URNS AUTO: ABNORMAL /HPF

## 2019-12-22 PROCEDURE — 71045 X-RAY EXAM CHEST 1 VIEW: CPT | Performed by: EMERGENCY MEDICINE

## 2019-12-22 PROCEDURE — 99223 1ST HOSP IP/OBS HIGH 75: CPT | Performed by: INTERNAL MEDICINE

## 2019-12-22 RX ORDER — TRAZODONE HYDROCHLORIDE 50 MG/1
50 TABLET ORAL NIGHTLY
Status: DISCONTINUED | OUTPATIENT
Start: 2019-12-22 | End: 2020-01-08

## 2019-12-22 RX ORDER — SODIUM CHLORIDE 0.9 % (FLUSH) 0.9 %
3 SYRINGE (ML) INJECTION AS NEEDED
Status: DISCONTINUED | OUTPATIENT
Start: 2019-12-22 | End: 2020-01-08

## 2019-12-22 RX ORDER — OXYBUTYNIN CHLORIDE 10 MG/1
10 TABLET, EXTENDED RELEASE ORAL DAILY
Status: DISCONTINUED | OUTPATIENT
Start: 2019-12-23 | End: 2020-01-08

## 2019-12-22 RX ORDER — PANTOPRAZOLE SODIUM 20 MG/1
20 TABLET, DELAYED RELEASE ORAL
Status: DISCONTINUED | OUTPATIENT
Start: 2019-12-23 | End: 2020-01-02

## 2019-12-22 RX ORDER — HEPARIN SODIUM 5000 [USP'U]/ML
5000 INJECTION, SOLUTION INTRAVENOUS; SUBCUTANEOUS EVERY 12 HOURS SCHEDULED
Status: DISCONTINUED | OUTPATIENT
Start: 2019-12-22 | End: 2019-12-24

## 2019-12-22 RX ORDER — MAGNESIUM OXIDE 420 MG
420 TABLET ORAL 2 TIMES DAILY
Status: ON HOLD | COMMUNITY
End: 2020-01-08

## 2019-12-22 RX ORDER — CINACALCET 30 MG/1
30 TABLET, FILM COATED ORAL 2 TIMES DAILY WITH MEALS
Status: DISCONTINUED | OUTPATIENT
Start: 2019-12-22 | End: 2019-12-22

## 2019-12-22 RX ORDER — ALLOPURINOL 100 MG/1
200 TABLET ORAL DAILY
Status: DISCONTINUED | OUTPATIENT
Start: 2019-12-23 | End: 2020-01-08

## 2019-12-22 RX ORDER — TRAMADOL HYDROCHLORIDE 50 MG/1
50 TABLET ORAL EVERY 12 HOURS PRN
Status: DISCONTINUED | OUTPATIENT
Start: 2019-12-22 | End: 2019-12-23

## 2019-12-22 RX ORDER — CHOLECALCIFEROL (VITAMIN D3) 50 MCG
2000 TABLET ORAL DAILY
COMMUNITY

## 2019-12-22 RX ORDER — PRAVASTATIN SODIUM 20 MG
20 TABLET ORAL NIGHTLY
Status: DISCONTINUED | OUTPATIENT
Start: 2019-12-22 | End: 2020-01-08

## 2019-12-22 RX ORDER — ALLOPURINOL 300 MG/1
300 TABLET ORAL DAILY
Status: DISCONTINUED | OUTPATIENT
Start: 2019-12-23 | End: 2019-12-22

## 2019-12-22 RX ORDER — ACETAMINOPHEN 500 MG
500 TABLET ORAL EVERY 4 HOURS PRN
Status: DISCONTINUED | OUTPATIENT
Start: 2019-12-22 | End: 2020-01-08

## 2019-12-22 RX ORDER — POLYETHYLENE GLYCOL 3350 17 G/17G
17 POWDER, FOR SOLUTION ORAL 2 TIMES DAILY PRN
COMMUNITY

## 2019-12-22 RX ORDER — ALFUZOSIN HYDROCHLORIDE 10 MG/1
10 TABLET, EXTENDED RELEASE ORAL
Status: DISCONTINUED | OUTPATIENT
Start: 2019-12-23 | End: 2020-01-08

## 2019-12-22 RX ORDER — METOPROLOL TARTRATE 50 MG/1
50 TABLET, FILM COATED ORAL 2 TIMES DAILY
Status: DISCONTINUED | OUTPATIENT
Start: 2019-12-22 | End: 2019-12-28

## 2019-12-22 RX ORDER — THIAMINE MONONITRATE (VIT B1) 100 MG
100 TABLET ORAL DAILY
COMMUNITY

## 2019-12-22 RX ORDER — SODIUM CHLORIDE 9 MG/ML
INJECTION, SOLUTION INTRAVENOUS CONTINUOUS
Status: DISCONTINUED | OUTPATIENT
Start: 2019-12-22 | End: 2019-12-24

## 2019-12-22 RX ORDER — MAGNESIUM OXIDE 400 MG (241.3 MG MAGNESIUM) TABLET
400 TABLET 2 TIMES DAILY
Status: DISCONTINUED | OUTPATIENT
Start: 2019-12-22 | End: 2019-12-24

## 2019-12-22 RX ORDER — ASPIRIN 81 MG/1
81 TABLET ORAL DAILY
Status: DISCONTINUED | OUTPATIENT
Start: 2019-12-23 | End: 2020-01-08

## 2019-12-22 RX ORDER — FLUOXETINE HYDROCHLORIDE 20 MG/1
20 CAPSULE ORAL DAILY
Status: DISCONTINUED | OUTPATIENT
Start: 2019-12-23 | End: 2020-01-08

## 2019-12-22 RX ORDER — ONDANSETRON 2 MG/ML
4 INJECTION INTRAMUSCULAR; INTRAVENOUS EVERY 4 HOURS PRN
Status: DISCONTINUED | OUTPATIENT
Start: 2019-12-22 | End: 2020-01-08

## 2019-12-22 RX ORDER — MELATONIN
3000 DAILY
COMMUNITY

## 2019-12-22 RX ORDER — LIDOCAINE HYDROCHLORIDE 20 MG/ML
10 JELLY TOPICAL ONCE
Status: COMPLETED | OUTPATIENT
Start: 2019-12-22 | End: 2019-12-22

## 2019-12-22 NOTE — ED PROVIDER NOTES
Patient Seen in: [facility] AND [facility] Emergency Department      History   Patient presents with:  Fever    Stated Complaint: fever, nausea.  recent biopsy    HPI    78yoM with with hx of HTN, HL, here with c/o fever of 103, nausea, and generalized weakness f • UPPER GI ENDOSCOPY,EXAM                      Social History    Tobacco Use      Smoking status: Never Smoker      Smokeless tobacco: Never Used    Alcohol use:  Yes      Alcohol/week: 0.0 standard drinks      Comment: Occasional glass of wine    Drug us No murmur. Comments: 2+ radial and DP pulses b/l, no leg swelling  Pulmonary:      Effort: Pulmonary effort is normal. No respiratory distress. Breath sounds: Normal breath sounds. No stridor. No wheezing or rales.    Abdominal:      General: Ther mg/dL    Sodium 134 (L) 136 - 145 mmol/L    Potassium 4.3 3.5 - 5.1 mmol/L    Chloride 103 98 - 112 mmol/L    CO2 23.0 21.0 - 32.0 mmol/L    Anion Gap 8 0 - 18 mmol/L    BUN 38 (H) 7 - 18 mg/dL    Creatinine 3.35 (H) 0.70 - 1.30 mg/dL    BUN/CREA Ratio 11. Negative    Ketones Urine Negative Negative mg/dL    Blood Urine Large (A) Negative    pH Urine 5.0 5.0 - 8.0    Protein Urine 100  (A) Negative mg/dL    Urobilinogen Urine <2.0 <2.0    Nitrite Urine Negative Negative    Leukocyte Esterase Urine Moderate ( 1000(8.2)   1000(8.2)   Shift Total(mL/kg) 1000(8.2)   1000(8.2)   OUTPUT   Shift Total(mL/kg)       Weight (kg) 122.7 122.7 122.7 122.7       Skin Color: Red  Skin Temperature: Warm                [facility]      Sepsis Reassessment Note health screening including reassessment of your blood pressure.     Medications Prescribed:  Current Discharge Medication List                   Present on Admission  Date Reviewed: 10/8/2019          ICD-10-CM Noted POA    * (Principal) Acute cystitis with

## 2019-12-22 NOTE — PLAN OF CARE
Patient stable. IV fluids infusing as ordered. Afebrile at this time. BP stable. Receiving IV zosyn as ordered. Treviño in place draining yellow, cloudy urine. Patient c/o nausea- MD notified.        Problem: Patient Centered Care  Goal: Patient preferences a urinary retention  Description  INTERVENTIONS:  - Assess patient’s ability to void and empty bladder  - Monitor intake/output and perform bladder scan as needed  - Follow urinary retention protocol/standard of care  - Consider collaborating with pharmacy t

## 2019-12-22 NOTE — ED INITIAL ASSESSMENT (HPI)
Pt arrives today for fever, took tylenol PTA. Pt states he had prostate biopsy Wednesday and started with fever Friday. Feels weak and nauseated.

## 2019-12-22 NOTE — ED NOTES
Patient has minor allergies to latex and iodine dye. Patient's marquez cath is made of latex and iodine dye was used to clean the area. SN assessment no reaction noted and no rash. Patient wants to leave the marquez in. MD aware.

## 2019-12-22 NOTE — PROGRESS NOTES
[facility] Pharmacy Note: Antimicrobial Weight Based Dose Adjustment for: piperacillin/tazobactam (Rich Macadamia)    Santosh Regan is a 70year old male who has been prescribed piperacillin/tazobactam (ZOSYN) 3.375 gm x1.     Estimated Creatinine Clearance: 19.7 mL/min (A

## 2019-12-22 NOTE — PROGRESS NOTES
[facility] [place] Dosing Service  Antibiotic Dosing    Sonja Valdovinos is a 70year old male for whom pharmacy is dosing zosyn for treatment of  UTI.  .  Other antibiotics (Not dosed by pharmacy): none    Allergies: is allergic to latex; radiology contrast iodinat

## 2019-12-22 NOTE — PROGRESS NOTES
[facility]      Sepsis Reassessment Note    BP (!) 139/94 (BP Location: Left arm)   Pulse 87   Temp 98.1 °F (36.7 °C) (Temporal)   Resp 21   Ht 6' (1.829 m)   Wt 265 lb 14 oz (120.6 kg)   SpO2 96%   BMI 36.06 kg/m²      4:04 PM    Cardiac

## 2019-12-22 NOTE — PROGRESS NOTES
Received patient from ER to PCCU 224. Report received from [facility]. Medications, labs, plan of care reviewed. Skin assessment on arrival to unit performed with JAKOB Bennett. Wounds are as follows: Redness to bilateral buttocks.  Mepilex applied to sacr

## 2019-12-22 NOTE — ED NOTES
Pt to ER with cousin with c/o fever, fatigue, and nausea that started on Friday. Pt states he had a prostate biopsy at [facility] on Wed. Pt c/o rectal pain. Pt states fever of 103 at home today.  Pt states he took tylenol prior to arrival. Pt pale in col

## 2019-12-22 NOTE — CONSULTS
[facility] HOSP - [facility]    Consult Note    Date:  12/22/2019  Date of Admission:  12/22/2019      Chief Complaint:   Margarette Macedo is a(n) 70year old male with shivering and rectal pain. HPI:   The patient carries a diagnosis of prostate cancer.   He Performed by Krista Montana MD at [facility] MAIN OR   • UPPER GI ENDOSCOPY,EXAM       Family History   Problem Relation Age of Onset   • Other (Other) Father         Natural Causes   • Other (Other) Mother         Aneurysm     Social History: Single, no kids, daily.    omeprazole (PRILOSEC) 20 MG Oral Capsule Delayed Release, Take 20 mg by mouth daily. Pravastatin Sodium (PRAVACHOL) 40 MG Oral Tab, Take 20 mg by mouth nightly. aspirin 81 MG Oral Tab, Take 81 mg by mouth daily.   Methylcellulose, Laxative, 500 inflation    Urinalysis–15,000 white blood cells    Assessment/Plan:   1. Sepsis with urinary tract infection possible prostatitis status post biopsy. Received a 30 mL/kg fluid bolus. Recommendations:  1. Sepsis protocol  2.   Empiric antibiotic with

## 2019-12-23 ENCOUNTER — APPOINTMENT (OUTPATIENT)
Dept: CT IMAGING | Facility: HOSPITAL | Age: 79
DRG: 856 | End: 2019-12-23
Attending: INTERNAL MEDICINE
Payer: MEDICARE

## 2019-12-23 ENCOUNTER — TELEPHONE (OUTPATIENT)
Dept: MEDSURG UNIT | Facility: HOSPITAL | Age: 79
End: 2019-12-23

## 2019-12-23 LAB
ANION GAP SERPL CALC-SCNC: 7 MMOL/L (ref 0–18)
BASOPHILS # BLD: 0 X10(3) UL (ref 0–0.2)
BASOPHILS NFR BLD: 0 %
BUN BLD-MCNC: 40 MG/DL (ref 7–18)
BUN/CREAT SERPL: 13.9 (ref 10–20)
CALCIUM BLD-MCNC: 6.3 MG/DL (ref 8.5–10.1)
CHLORIDE SERPL-SCNC: 114 MMOL/L (ref 98–112)
CO2 SERPL-SCNC: 22 MMOL/L (ref 21–32)
CREAT BLD-MCNC: 2.87 MG/DL (ref 0.7–1.3)
DEPRECATED RDW RBC AUTO: 58.5 FL (ref 35.1–46.3)
EOSINOPHIL # BLD: 0 X10(3) UL (ref 0–0.7)
EOSINOPHIL NFR BLD: 0 %
ERYTHROCYTE [DISTWIDTH] IN BLOOD BY AUTOMATED COUNT: 17.1 % (ref 11–15)
GLUCOSE BLD-MCNC: 92 MG/DL (ref 70–99)
HAV IGM SER QL: 1.3 MG/DL (ref 1.6–2.6)
HCT VFR BLD AUTO: 37.5 % (ref 39–53)
HGB BLD-MCNC: 11.8 G/DL (ref 13–17.5)
LACTATE SERPL-SCNC: 1.3 MMOL/L (ref 0.4–2)
LACTATE SERPL-SCNC: 2 MMOL/L (ref 0.4–2)
LYMPHOCYTES NFR BLD: 12 %
LYMPHOCYTES NFR BLD: 2.77 X10(3) UL (ref 1–4)
MCH RBC QN AUTO: 29 PG (ref 26–34)
MCHC RBC AUTO-ENTMCNC: 31.5 G/DL (ref 31–37)
MCV RBC AUTO: 92.1 FL (ref 80–100)
MONOCYTES # BLD: 1.16 X10(3) UL (ref 0.1–1)
MONOCYTES NFR BLD: 5 %
NEUTROPHILS # BLD AUTO: 18.55 X10 (3) UL (ref 1.5–7.7)
NEUTROPHILS NFR BLD: 67 %
NEUTS BAND NFR BLD: 16 %
NEUTS HYPERSEG # BLD: 19.17 X10(3) UL (ref 1.5–7.7)
OSMOLALITY SERPL CALC.SUM OF ELEC: 305 MOSM/KG (ref 275–295)
PLATELET # BLD AUTO: 179 10(3)UL (ref 150–450)
PLATELET MORPHOLOGY: NORMAL
POTASSIUM SERPL-SCNC: 4.7 MMOL/L (ref 3.5–5.1)
RBC # BLD AUTO: 4.07 X10(6)UL (ref 3.8–5.8)
SODIUM SERPL-SCNC: 143 MMOL/L (ref 136–145)
TOTAL CELLS COUNTED: 100
WBC # BLD AUTO: 23.1 X10(3) UL (ref 4–11)

## 2019-12-23 PROCEDURE — 99233 SBSQ HOSP IP/OBS HIGH 50: CPT | Performed by: INTERNAL MEDICINE

## 2019-12-23 PROCEDURE — 74176 CT ABD & PELVIS W/O CONTRAST: CPT | Performed by: INTERNAL MEDICINE

## 2019-12-23 RX ORDER — MORPHINE SULFATE 2 MG/ML
INJECTION, SOLUTION INTRAMUSCULAR; INTRAVENOUS
Status: COMPLETED
Start: 2019-12-23 | End: 2019-12-23

## 2019-12-23 RX ORDER — MAGNESIUM OXIDE 400 MG (241.3 MG MAGNESIUM) TABLET
800 TABLET ONCE
Status: COMPLETED | OUTPATIENT
Start: 2019-12-23 | End: 2019-12-23

## 2019-12-23 RX ORDER — TRAMADOL HYDROCHLORIDE 50 MG/1
50 TABLET ORAL EVERY 6 HOURS PRN
Status: DISCONTINUED | OUTPATIENT
Start: 2019-12-23 | End: 2019-12-23

## 2019-12-23 RX ORDER — TRAMADOL HYDROCHLORIDE 50 MG/1
50 TABLET ORAL EVERY 12 HOURS PRN
Status: DISCONTINUED | OUTPATIENT
Start: 2019-12-23 | End: 2020-01-08

## 2019-12-23 RX ORDER — HYDROCODONE BITARTRATE AND ACETAMINOPHEN 5; 325 MG/1; MG/1
1 TABLET ORAL EVERY 6 HOURS PRN
Status: DISCONTINUED | OUTPATIENT
Start: 2019-12-23 | End: 2019-12-28

## 2019-12-23 RX ORDER — MORPHINE SULFATE 2 MG/ML
2 INJECTION, SOLUTION INTRAMUSCULAR; INTRAVENOUS EVERY 2 HOUR PRN
Status: DISCONTINUED | OUTPATIENT
Start: 2019-12-23 | End: 2019-12-24

## 2019-12-23 NOTE — PROGRESS NOTES
[facility]    Progress Note    Jennifer Perez Patient Status:  Inpatient    1940 MRN [id number]   Location [facility] 2W/SW Attending Akilah Dorsey MD   Hosp Day # 1 PCP Aldair Gatica MD       Subjective:   Jennfier Perez  (H) 12/23/2019    CO2 22.0 12/23/2019    GLU 92 12/23/2019    CA 6.3 (L) 12/23/2019    ALB 3.5 10/09/2017    ALKPHO 92 10/09/2017    BILT 0.8 10/09/2017    TP 6.5 10/09/2017    AST 15 10/09/2017    ALT 15 (L) 10/09/2017    TSH 3.24 10/09/2017

## 2019-12-23 NOTE — PROGRESS NOTES
visited at request of patient. Empathic listening, encouragement, and prayer offered. Patient spoke of his hopes to return home for [place]. Patient and family expressed thanks for visit.      12/23/19 1019   Clinical Encounter Type   Visited Wit

## 2019-12-23 NOTE — PLAN OF CARE
Problem: Patient Centered Care  Goal: Patient preferences are identified and integrated in the patient's plan of care  Description  Interventions:  - What would you like us to know as we care for you?   - Provide timely, complete, and accurate informatio scan as needed  - Follow urinary retention protocol/standard of care  - Consider collaborating with pharmacy to review patient's medication profile  - Implement strategies to promote bladder emptying  Outcome: Progressing     Problem: METABOLIC/FLUID AND E

## 2019-12-23 NOTE — PROGRESS NOTES
IOP within reasonable range TODAY. [facility]    Progress Note      Assessment and Plan:   1. Sepsis with urinary tract infection possible prostatitis status post biopsy. Received a 30 mL/kg fluid bolus.   The patient is stabilizing clinically but still has very severe pain [address]  Pager: [phone]

## 2019-12-23 NOTE — RESPIRATORY THERAPY NOTE
Pt states they have not used CPAP machine in over a year. Pt declined offer to utilize hospital provided equipment during their stay.

## 2019-12-23 NOTE — PROGRESS NOTES
Patient not receiving any relief from multiple medication sources for pain. Lidocaine patch, not helping. Ultram, not helping. Norco, not helping. Tylenol, not helping. Will try to page Limperis again. Patient requesting imaging.

## 2019-12-23 NOTE — PROGRESS NOTES
[facility] Pharmacy Note:  Renal Dose Adjustment    Piperacillin/tazobactam (Zosyn) dosing had been previously adjusted by pharmacy due to renal insufficiency for [employer]. Estimated Creatinine Clearance: 23.7 mL/min (A) (based on SCr of 2.87 mg/dL (H)).

## 2019-12-23 NOTE — PROGRESS NOTES
[facility] Pharmacy Note:  Renal Dose Adjustment for Tramadol Rod Odonnell    Mike Schuster has been prescribed Tramadol (ULTRAM) 50 mg orally every 6 hours as needed for pain. Estimated Creatinine Clearance: 20.3 mL/min (A) (based on SCr of 3.35 mg/dL (H)).     Hi

## 2019-12-23 NOTE — PLAN OF CARE
Patient AOx4. 4L NC at night. Patient denies wearing CPAP and declines the use of CPAP during this stay. Afebrile. Second sepsis bolus of 3,618ml completed. Second bolus given due to hypotension. Final lactic result of 1.3. BP stable.   Urine in marquez yello Assess for signs of decreased coronary artery perfusion - ex.  Angina  - Evaluate fluid balance, assess for edema, trend weights  Outcome: Progressing     Problem: GENITOURINARY - ADULT  Goal: Absence of urinary retention  Description  INTERVENTIONS:  - Ass

## 2019-12-23 NOTE — H&P
[facility]    PATIENT'S NAME: [place], [place]   ATTENDING PHYSICIAN: Kristy Monzon MD   PATIENT ACCOUNT#:   [id number]    LOCATION:  [facility] RECORD #:   [id number]       YOB: 1940  ADMISSION DATE:       12/22/201 headache, no cough. Positive fever at 103.5. He did have nausea, but no vomiting. No diarrhea. He did have hematuria, dysuria; both started after the prostate biopsy. No rashes.       PHYSICAL EXAMINATION:    GENERAL:  A 59-year-old white male, alert,

## 2019-12-24 ENCOUNTER — APPOINTMENT (OUTPATIENT)
Dept: CV DIAGNOSTICS | Facility: HOSPITAL | Age: 79
DRG: 856 | End: 2019-12-24
Attending: INTERNAL MEDICINE
Payer: MEDICARE

## 2019-12-24 LAB
ALBUMIN SERPL-MCNC: 1.8 G/DL (ref 3.4–5)
ALBUMIN/GLOB SERPL: 0.5 {RATIO} (ref 1–2)
ALP LIVER SERPL-CCNC: 75 U/L (ref 45–117)
ALT SERPL-CCNC: 10 U/L (ref 16–61)
ANION GAP SERPL CALC-SCNC: 5 MMOL/L (ref 0–18)
APTT PPP: 39.6 SECONDS (ref 23.2–35.3)
APTT PPP: 39.7 SECONDS (ref 23.2–35.3)
APTT PPP: 41.2 SECONDS (ref 23.2–35.3)
AST SERPL-CCNC: 15 U/L (ref 15–37)
BASOPHILS # BLD AUTO: 0.04 X10(3) UL (ref 0–0.2)
BASOPHILS NFR BLD AUTO: 0.3 %
BILIRUB SERPL-MCNC: 0.3 MG/DL (ref 0.1–2)
BUN BLD-MCNC: 28 MG/DL (ref 7–18)
BUN/CREAT SERPL: 11.7 (ref 10–20)
CALCIUM BLD-MCNC: 7.2 MG/DL (ref 8.5–10.1)
CHLORIDE SERPL-SCNC: 110 MMOL/L (ref 98–112)
CO2 SERPL-SCNC: 23 MMOL/L (ref 21–32)
CREAT BLD-MCNC: 2.4 MG/DL (ref 0.7–1.3)
DEPRECATED RDW RBC AUTO: 56.1 FL (ref 35.1–46.3)
EOSINOPHIL # BLD AUTO: 0.14 X10(3) UL (ref 0–0.7)
EOSINOPHIL NFR BLD AUTO: 1 %
ERYTHROCYTE [DISTWIDTH] IN BLOOD BY AUTOMATED COUNT: 16.9 % (ref 11–15)
GLOBULIN PLAS-MCNC: 3.9 G/DL (ref 2.8–4.4)
GLUCOSE BLD-MCNC: 108 MG/DL (ref 70–99)
HAV IGM SER QL: 1.4 MG/DL (ref 1.6–2.6)
HCT VFR BLD AUTO: 36.6 % (ref 39–53)
HGB BLD-MCNC: 11.7 G/DL (ref 13–17.5)
IMM GRANULOCYTES # BLD AUTO: 0.18 X10(3) UL (ref 0–1)
IMM GRANULOCYTES NFR BLD: 1.3 %
LYMPHOCYTES # BLD AUTO: 1.62 X10(3) UL (ref 1–4)
LYMPHOCYTES NFR BLD AUTO: 11.5 %
M PROTEIN MFR SERPL ELPH: 5.7 G/DL (ref 6.4–8.2)
MCH RBC QN AUTO: 28.7 PG (ref 26–34)
MCHC RBC AUTO-ENTMCNC: 32 G/DL (ref 31–37)
MCV RBC AUTO: 89.9 FL (ref 80–100)
MONOCYTES # BLD AUTO: 0.55 X10(3) UL (ref 0.1–1)
MONOCYTES NFR BLD AUTO: 3.9 %
NEUTROPHILS # BLD AUTO: 11.55 X10 (3) UL (ref 1.5–7.7)
NEUTROPHILS # BLD AUTO: 11.55 X10(3) UL (ref 1.5–7.7)
NEUTROPHILS NFR BLD AUTO: 82 %
OSMOLALITY SERPL CALC.SUM OF ELEC: 292 MOSM/KG (ref 275–295)
PLATELET # BLD AUTO: 179 10(3)UL (ref 150–450)
POTASSIUM SERPL-SCNC: 4.7 MMOL/L (ref 3.5–5.1)
RBC # BLD AUTO: 4.07 X10(6)UL (ref 3.8–5.8)
SODIUM SERPL-SCNC: 138 MMOL/L (ref 136–145)
TROPONIN I SERPL-MCNC: <0.045 NG/ML (ref ?–0.04)
WBC # BLD AUTO: 14.1 X10(3) UL (ref 4–11)

## 2019-12-24 PROCEDURE — 93306 TTE W/DOPPLER COMPLETE: CPT | Performed by: INTERNAL MEDICINE

## 2019-12-24 PROCEDURE — 99233 SBSQ HOSP IP/OBS HIGH 50: CPT | Performed by: INTERNAL MEDICINE

## 2019-12-24 RX ORDER — AMIODARONE HYDROCHLORIDE 200 MG/1
400 TABLET ORAL 3 TIMES DAILY
Status: DISCONTINUED | OUTPATIENT
Start: 2019-12-24 | End: 2019-12-28

## 2019-12-24 RX ORDER — FUROSEMIDE 10 MG/ML
20 INJECTION INTRAMUSCULAR; INTRAVENOUS ONCE
Status: COMPLETED | OUTPATIENT
Start: 2019-12-24 | End: 2019-12-24

## 2019-12-24 RX ORDER — HEPARIN SODIUM AND DEXTROSE 10000; 5 [USP'U]/100ML; G/100ML
INJECTION INTRAVENOUS CONTINUOUS
Status: DISCONTINUED | OUTPATIENT
Start: 2019-12-24 | End: 2019-12-25

## 2019-12-24 RX ORDER — HEPARIN SODIUM AND DEXTROSE 10000; 5 [USP'U]/100ML; G/100ML
1000 INJECTION INTRAVENOUS ONCE
Status: COMPLETED | OUTPATIENT
Start: 2019-12-24 | End: 2019-12-24

## 2019-12-24 RX ORDER — MAGNESIUM OXIDE 400 MG (241.3 MG MAGNESIUM) TABLET
800 TABLET ONCE
Status: COMPLETED | OUTPATIENT
Start: 2019-12-24 | End: 2019-12-24

## 2019-12-24 RX ORDER — MAGNESIUM OXIDE 400 MG (241.3 MG MAGNESIUM) TABLET
400 TABLET
Status: DISCONTINUED | OUTPATIENT
Start: 2019-12-24 | End: 2020-01-08

## 2019-12-24 NOTE — PROGRESS NOTES
[facility]    Progress Note    Gopal Ruiz Patient Status:  Inpatient    1940 MRN [id number]   Location [facility] 5SW/SE Attending Saturnino Del Toro MD   Hosp Day # 2 PCP Donte Asencio MD        Subjective:     Constitu 12/24/2019    BUN 28 (H) 12/24/2019     12/24/2019    K 4.7 12/24/2019     12/24/2019    CO2 23.0 12/24/2019     (H) 12/24/2019    CA 7.2 (L) 12/24/2019    ALB 1.8 (L) 12/24/2019    ALKPHO 75 12/24/2019    BILT 0.3 12/24/2019    TP 5.7 (

## 2019-12-24 NOTE — OCCUPATIONAL THERAPY NOTE
OCCUPATIONAL THERAPY EVALUATION - INPATIENT     Room Number: 548/548-A  Evaluation Date: 12/24/2019  Type of Evaluation: Initial  Presenting Problem: (sepsis)    Physician Order: IP Consult to Occupational Therapy  Reason for Therapy: ADL/IADL Dysfunction been calculated based on documentation in the [facility] '6 clicks' Inpatient Daily Activity Short Form. Research supports that patients with this level of impairment may benefit from home w/ HH vs TAD.      DISCHARGE RECOMMENDATIONS  OT Discharge Recommendatio Vargas Camacho MD at [facility] MAIN OR   • LUMBAR INTERLAMINAR EPIDURAL INJECTION N/A 8/20/2015    Performed by Sheba Cook MD at [facility] MAIN OR   • UPPER GI ENDOSCOPY,EXAM       HOME SITUATION  Type of Home: Apartment  Home Layout: One level(elevator access)  [address] Lot  -   Putting on and taking off regular upper body clothing?: A Little  -   Taking care of personal grooming such as brushing teeth?: None  -   Eating meals?: None    AM-PAC Score:  Score: 17  Approx Degree of Impairment: 50.11%  Standardized Score (AM-

## 2019-12-24 NOTE — PHYSICAL THERAPY NOTE
MDO received, chart reviewed. Attempted to see patient but he is at cardiology testing. Will check back later.

## 2019-12-24 NOTE — PROGRESS NOTES
[facility]    Progress Note    Geofm Fore Patient Status:  Inpatient    1940 MRN [id number]   Location [facility] 5SW/SE Attending Jenni Sandra MD   Hosp Day # 2 PCP Armaan Santillan MD       Subjective:   Geofm Fore 138 12/24/2019    K 4.7 12/24/2019     12/24/2019    CO2 23.0 12/24/2019     (H) 12/24/2019    CA 7.2 (L) 12/24/2019    ALB 1.8 (L) 12/24/2019    ALKPHO 75 12/24/2019    BILT 0.3 12/24/2019    TP 5.7 (L) 12/24/2019    AST 15 12/24/2019    ALT MD  12/24/2019

## 2019-12-24 NOTE — CONSULTS
[facility]    PATIENT'S NAME: Arlin Lau   ATTENDING PHYSICIAN: Hilary Mai MD   CONSULTING PHYSICIAN: Jose Antonio Ty File, DO   PATIENT ACCOUNT#:   [de-identified]    LOCATION:  [address] [place] [address] RECORD #:   [id number]       DATE OF BIRTH: demonstrating atrial fibrillation with mild rapid ventricular response. Heart rate 111. IMPRESSION:    1.   New onset of rapid atrial fibrillation, likely related to the infection.   2.   Prostate biopsy with sepsis and urinary infection because of pros

## 2019-12-24 NOTE — PROGRESS NOTES
Per pharmacy, zosyn and heparin gtt compatible. Patient has two separate lines in same arm, ok to infuse.

## 2019-12-24 NOTE — PHYSICAL THERAPY NOTE
PHYSICAL THERAPY EVALUATION - INPATIENT     Room Number: 548/548-A  Evaluation Date: 12/24/2019  Type of Evaluation: Initial   Physician Order: PT Eval and Treat    Presenting Problem: acute cystitis w/hematuria; sepsis, back pain  Reason for Therapy:  Mob x2 transfers to chair. Once seated he feels he wants to walk so is assisted to stand with mod A and walks with mod A and RW and cues for position in walker. Small shuffling steps noted and he fatigues and requesting to sit.  At same time RN in to report tel vomiting)    • Sleep apnea     repeat sleep study in 2018 was negative. stopped CPAP 1 year ago       Past Surgical History  Past Surgical History:   Procedure Laterality Date   • APPENDECTOMY  1968   • APPENDECTOMY     • BACK SURGERY  1998    L4-5 Disc He limits and anxious about moving    RANGE OF MOTION AND STRENGTH ASSESSMENT  Upper extremity ROM and strength are within functional limits but limited, right rotator cuff tear and chronic neck issues    Lower extremity ROM is within functional limits     Lo supine to sit with log roll and back sparing strategies. Assist at trunk and LE's    Transfers: max A x 2 initial sit to/from stand progressing to min A x 2 with RW.  Improves with repetition    Exercise/Education Provided:  Bed mobility  Body mechanics  En

## 2019-12-25 ENCOUNTER — APPOINTMENT (OUTPATIENT)
Dept: ULTRASOUND IMAGING | Facility: HOSPITAL | Age: 79
DRG: 856 | End: 2019-12-25
Attending: FAMILY MEDICINE
Payer: MEDICARE

## 2019-12-25 ENCOUNTER — APPOINTMENT (OUTPATIENT)
Dept: GENERAL RADIOLOGY | Facility: HOSPITAL | Age: 79
DRG: 856 | End: 2019-12-25
Attending: INTERNAL MEDICINE
Payer: MEDICARE

## 2019-12-25 LAB
ALBUMIN SERPL-MCNC: 1.8 G/DL (ref 3.4–5)
ALBUMIN/GLOB SERPL: 0.4 {RATIO} (ref 1–2)
ALP LIVER SERPL-CCNC: 87 U/L (ref 45–117)
ALT SERPL-CCNC: 9 U/L (ref 16–61)
ANION GAP SERPL CALC-SCNC: 7 MMOL/L (ref 0–18)
APTT PPP: 51.2 SECONDS (ref 23.2–35.3)
AST SERPL-CCNC: 9 U/L (ref 15–37)
BASOPHILS # BLD AUTO: 0.07 X10(3) UL (ref 0–0.2)
BASOPHILS NFR BLD AUTO: 0.5 %
BILIRUB SERPL-MCNC: 0.5 MG/DL (ref 0.1–2)
BUN BLD-MCNC: 25 MG/DL (ref 7–18)
BUN/CREAT SERPL: 11 (ref 10–20)
CALCIUM BLD-MCNC: 8.1 MG/DL (ref 8.5–10.1)
CHLORIDE SERPL-SCNC: 108 MMOL/L (ref 98–112)
CO2 SERPL-SCNC: 24 MMOL/L (ref 21–32)
CREAT BLD-MCNC: 2.28 MG/DL (ref 0.7–1.3)
DEPRECATED RDW RBC AUTO: 54.7 FL (ref 35.1–46.3)
EOSINOPHIL # BLD AUTO: 0.29 X10(3) UL (ref 0–0.7)
EOSINOPHIL NFR BLD AUTO: 2.2 %
ERYTHROCYTE [DISTWIDTH] IN BLOOD BY AUTOMATED COUNT: 16.7 % (ref 11–15)
GLOBULIN PLAS-MCNC: 4.1 G/DL (ref 2.8–4.4)
GLUCOSE BLD-MCNC: 126 MG/DL (ref 70–99)
HAV IGM SER QL: 1.3 MG/DL (ref 1.6–2.6)
HCT VFR BLD AUTO: 35.7 % (ref 39–53)
HGB BLD-MCNC: 11.8 G/DL (ref 13–17.5)
IMM GRANULOCYTES # BLD AUTO: 0.16 X10(3) UL (ref 0–1)
IMM GRANULOCYTES NFR BLD: 1.2 %
LYMPHOCYTES # BLD AUTO: 1.97 X10(3) UL (ref 1–4)
LYMPHOCYTES NFR BLD AUTO: 14.9 %
M PROTEIN MFR SERPL ELPH: 5.9 G/DL (ref 6.4–8.2)
MCH RBC QN AUTO: 29.4 PG (ref 26–34)
MCHC RBC AUTO-ENTMCNC: 33.1 G/DL (ref 31–37)
MCV RBC AUTO: 88.8 FL (ref 80–100)
MONOCYTES # BLD AUTO: 0.81 X10(3) UL (ref 0.1–1)
MONOCYTES NFR BLD AUTO: 6.1 %
NEUTROPHILS # BLD AUTO: 9.94 X10 (3) UL (ref 1.5–7.7)
NEUTROPHILS # BLD AUTO: 9.94 X10(3) UL (ref 1.5–7.7)
NEUTROPHILS NFR BLD AUTO: 75.1 %
OSMOLALITY SERPL CALC.SUM OF ELEC: 294 MOSM/KG (ref 275–295)
PLATELET # BLD AUTO: 186 10(3)UL (ref 150–450)
POTASSIUM SERPL-SCNC: 4.3 MMOL/L (ref 3.5–5.1)
RBC # BLD AUTO: 4.02 X10(6)UL (ref 3.8–5.8)
SODIUM SERPL-SCNC: 139 MMOL/L (ref 136–145)
TSI SER-ACNC: 2.76 MIU/ML (ref 0.36–3.74)
WBC # BLD AUTO: 13.2 X10(3) UL (ref 4–11)

## 2019-12-25 PROCEDURE — 71045 X-RAY EXAM CHEST 1 VIEW: CPT | Performed by: INTERNAL MEDICINE

## 2019-12-25 PROCEDURE — 99232 SBSQ HOSP IP/OBS MODERATE 35: CPT | Performed by: INTERNAL MEDICINE

## 2019-12-25 PROCEDURE — 76775 US EXAM ABDO BACK WALL LIM: CPT | Performed by: FAMILY MEDICINE

## 2019-12-25 RX ORDER — AMLODIPINE BESYLATE 2.5 MG/1
2.5 TABLET ORAL DAILY
Status: DISCONTINUED | OUTPATIENT
Start: 2019-12-25 | End: 2020-01-05

## 2019-12-25 NOTE — PROGRESS NOTES
Patient seen in follow up. Patient denies any chest pain or sob. Hypoxic yesterday, given 20 mg IV lasix with good urine output.  Still in afib with rvr.   12/25/19  1511   BP: (!) 183/84   Pulse: 76   Resp: 20   Temp: 97.5 °F (36.4 °C)       Intake/O acetaminophen (TYLENOL EXTRA STRENGTH) tab 500 mg, 500 mg, Oral, Q4H PRN  Normal Saline Flush 0.9 % injection 3 mL, 3 mL, Intravenous, PRN  allopurinol (ZYLOPRIM) tab 200 mg, 200 mg, Oral, Daily  ondansetron HCl (ZOFRAN) injection 4 mg, 4 mg, Intravenous, AmLODIPine Besylate (NORVASC) 2.5 MG Oral Tab, Take 2.5 mg by mouth daily. furosemide (LASIX) 20 MG Oral Tab, Take 40 mg by mouth daily. Potassium Chloride ER (K-DUR) 10 MEQ Oral Tab CR, Take 10 mEq by mouth 2 (two) times daily.         Ct Abdomen+pelvi

## 2019-12-25 NOTE — PLAN OF CARE
Problem: Patient Centered Care  Goal: Patient preferences are identified and integrated in the patient's plan of care  Description  Interventions:  - What would you like us to know as we care for you?  - Provide timely, complete, and accurate information scan as needed  - Follow urinary retention protocol/standard of care  - Consider collaborating with pharmacy to review patient's medication profile  - Implement strategies to promote bladder emptying  Outcome: Progressing     Problem: METABOLIC/FLUID AND E

## 2019-12-25 NOTE — PLAN OF CARE
A/Ox4. Alarm parameters appropiate for pt: bed and chair alrm. Fall risk precautions appopiater for pt: call light within reach, bed in lowest position. Pain controlled with norco. Heparin gttt discontinued. First dose of Eliquis given.  O2 sats above 90% for signs of decreased coronary artery perfusion - ex.  Angina  - Evaluate fluid balance, assess for edema, trend weights  Outcome: Progressing     Problem: GENITOURINARY - ADULT  Goal: Absence of urinary retention  Description  INTERVENTIONS:  - Assess pat and document risk factors for pressure ulcer development  - Assess and document skin integrity  - Monitor for areas of redness and/or skin breakdown  - Initiate interventions, skin care algorithm/standards of care as needed  Outcome: Progressing     Proble

## 2019-12-25 NOTE — PLAN OF CARE
Problem: Patient Centered Care  Goal: Patient preferences are identified and integrated in the patient's plan of care  Description  Interventions:  - What would you like us to know as we care for you?  \"I live with my friend, Curtis\"  - Provide timely, c intake/output and perform bladder scan as needed  - Follow urinary retention protocol/standard of care  - Consider collaborating with pharmacy to review patient's medication profile  - Implement strategies to promote bladder emptying  Outcome: Progressing another lidocaine patch. Still on hep gtt at 12, awaiting PTT result. On 2L NC now. VSS. Will continue to monitor. Hep gtt now at 14. Repeat at 0445.

## 2019-12-25 NOTE — PROGRESS NOTES
[facility]    Progress Note    Margarettehilda Macedo Patient Status:  Inpatient    1940 MRN [id number]   Location [facility] 5SW/SE Attending Darrell Kwon MD   Hosp Day # 3 PCP Roseline Smith MD        Subjective:     Constitu 12/25/2019    BUN 25 (H) 12/25/2019     12/25/2019    K 4.3 12/25/2019     12/25/2019    CO2 24.0 12/25/2019     (H) 12/25/2019    CA 8.1 (L) 12/25/2019    ALB 1.8 (L) 12/25/2019    ALKPHO 87 12/25/2019    BILT 0.5 12/25/2019    TP 5.9 (

## 2019-12-25 NOTE — PROGRESS NOTES
[facility]    Progress Note    Mojgan Simms Patient Status:  Inpatient    1940 MRN [id number]   Location [facility] 5SW/SE Attending Jasmyn Ryder MD   Hosp Day # 3 PCP Jayson Santizo MD       Subjective:   Mojgan Simms (L) 12/25/2019    HCT 35.7 (L) 12/25/2019    .0 12/25/2019    CREATSERUM 2.28 (H) 12/25/2019    BUN 25 (H) 12/25/2019     12/25/2019    K 4.3 12/25/2019     12/25/2019    CO2 24.0 12/25/2019     (H) 12/25/2019    CA 8.1 (L) 12/25/

## 2019-12-26 LAB
ANION GAP SERPL CALC-SCNC: 5 MMOL/L (ref 0–18)
APTT PPP: 33.8 SECONDS (ref 23.2–35.3)
BASOPHILS # BLD AUTO: 0.07 X10(3) UL (ref 0–0.2)
BASOPHILS NFR BLD AUTO: 0.6 %
BUN BLD-MCNC: 25 MG/DL (ref 7–18)
BUN/CREAT SERPL: 12 (ref 10–20)
CALCIUM BLD-MCNC: 8.9 MG/DL (ref 8.5–10.1)
CHLORIDE SERPL-SCNC: 108 MMOL/L (ref 98–112)
CO2 SERPL-SCNC: 25 MMOL/L (ref 21–32)
CREAT BLD-MCNC: 2.09 MG/DL (ref 0.7–1.3)
DEPRECATED RDW RBC AUTO: 53.7 FL (ref 35.1–46.3)
EOSINOPHIL # BLD AUTO: 0.24 X10(3) UL (ref 0–0.7)
EOSINOPHIL NFR BLD AUTO: 2.2 %
ERYTHROCYTE [DISTWIDTH] IN BLOOD BY AUTOMATED COUNT: 16.6 % (ref 11–15)
GLUCOSE BLD-MCNC: 125 MG/DL (ref 70–99)
HAV IGM SER QL: 1.5 MG/DL (ref 1.6–2.6)
HCT VFR BLD AUTO: 36 % (ref 39–53)
HGB BLD-MCNC: 11.6 G/DL (ref 13–17.5)
IMM GRANULOCYTES # BLD AUTO: 0.37 X10(3) UL (ref 0–1)
IMM GRANULOCYTES NFR BLD: 3.3 %
LYMPHOCYTES # BLD AUTO: 1.7 X10(3) UL (ref 1–4)
LYMPHOCYTES NFR BLD AUTO: 15.3 %
MCH RBC QN AUTO: 28.4 PG (ref 26–34)
MCHC RBC AUTO-ENTMCNC: 32.2 G/DL (ref 31–37)
MCV RBC AUTO: 88.2 FL (ref 80–100)
MONOCYTES # BLD AUTO: 0.83 X10(3) UL (ref 0.1–1)
MONOCYTES NFR BLD AUTO: 7.5 %
NEUTROPHILS # BLD AUTO: 7.89 X10 (3) UL (ref 1.5–7.7)
NEUTROPHILS # BLD AUTO: 7.89 X10(3) UL (ref 1.5–7.7)
NEUTROPHILS NFR BLD AUTO: 71.1 %
OSMOLALITY SERPL CALC.SUM OF ELEC: 292 MOSM/KG (ref 275–295)
PLATELET # BLD AUTO: 202 10(3)UL (ref 150–450)
POTASSIUM SERPL-SCNC: 4.3 MMOL/L (ref 3.5–5.1)
RBC # BLD AUTO: 4.08 X10(6)UL (ref 3.8–5.8)
SODIUM SERPL-SCNC: 138 MMOL/L (ref 136–145)
WBC # BLD AUTO: 11.1 X10(3) UL (ref 4–11)

## 2019-12-26 PROCEDURE — 99232 SBSQ HOSP IP/OBS MODERATE 35: CPT | Performed by: INTERNAL MEDICINE

## 2019-12-26 RX ORDER — MAGNESIUM OXIDE 400 MG (241.3 MG MAGNESIUM) TABLET
800 TABLET ONCE
Status: COMPLETED | OUTPATIENT
Start: 2019-12-26 | End: 2019-12-26

## 2019-12-26 RX ORDER — SIMETHICONE 80 MG
80 TABLET,CHEWABLE ORAL 2 TIMES DAILY PRN
Status: DISCONTINUED | OUTPATIENT
Start: 2019-12-26 | End: 2020-01-01

## 2019-12-26 RX ORDER — CYCLOBENZAPRINE HCL 10 MG
10 TABLET ORAL 3 TIMES DAILY PRN
Status: DISCONTINUED | OUTPATIENT
Start: 2019-12-26 | End: 2020-01-08

## 2019-12-26 RX ORDER — ACYCLOVIR 800 MG/1
800 TABLET ORAL
Status: DISCONTINUED | OUTPATIENT
Start: 2019-12-26 | End: 2020-01-02

## 2019-12-26 NOTE — PROGRESS NOTES
Patient seen in follow up. Patient denies any chest pain or sob. Off O2 now.     12/26/19  0923   BP: 151/78   Pulse: 74   Resp: 20   Temp: 97.8 °F (36.6 °C)       Intake/Output Summary (Last 24 hours) at 12/26/2019 1031  Last data filed at 12/26/2019 Normal Saline Flush 0.9 % injection 3 mL, 3 mL, Intravenous, PRN  allopurinol (ZYLOPRIM) tab 200 mg, 200 mg, Oral, Daily  ondansetron HCl (ZOFRAN) injection 4 mg, 4 mg, Intravenous, Q4H PRN      PEG 3350 Oral Powd Pack, Take 17 g by mouth 2 (two) times afua furosemide (LASIX) 20 MG Oral Tab, Take 40 mg by mouth daily. Potassium Chloride ER (K-DUR) 10 MEQ Oral Tab CR, Take 10 mEq by mouth 2 (two) times daily. Xr Chest Ap Portable  (cpt=71045)    Result Date: 12/25/2019  CONCLUSION:  1.  Discoid config

## 2019-12-26 NOTE — PLAN OF CARE
Problem: Patient Centered Care  Goal: Patient preferences are identified and integrated in the patient's plan of care  Description  Interventions:  - What would you like us to know as we care for you?  - Provide timely, complete, and accurate information scan as needed  - Follow urinary retention protocol/standard of care  - Consider collaborating with pharmacy to review patient's medication profile  - Implement strategies to promote bladder emptying  Outcome: Progressing     Problem: METABOLIC/FLUID AND E pain goal  Description  INTERVENTIONS:  - Encourage pt to monitor pain and request assistance  - Assess pain using appropriate pain scale  - Administer analgesics based on type and severity of pain and evaluate response  - Implement non-pharmacological kayla

## 2019-12-26 NOTE — PLAN OF CARE
A/Ox4. Alarm parameters appropiate for pt: bed and chair alrm. Fall risk precautions appopiater for pt: call light within reach, bed in lowest position. Pain controlled with [place]. On Eliquis PO. O2 sats above 90% on room air.  Pt placed on isolation for po Assess quality of pulses, skin color and temperature  - Assess for signs of decreased coronary artery perfusion - ex.  Angina  - Evaluate fluid balance, assess for edema, trend weights  Outcome: Progressing     Problem: GENITOURINARY - ADULT  Goal: Absence integrity remains intact  Description  INTERVENTIONS  - Assess and document risk factors for pressure ulcer development  - Assess and document skin integrity  - Monitor for areas of redness and/or skin breakdown  - Initiate interventions, skin care algorit

## 2019-12-26 NOTE — PROGRESS NOTES
[facility]    Progress Note    Ayla Olivares Patient Status:  Inpatient    1940 MRN [id number]   Location [facility] 5SW/SE Attending Orly Arguello MD   Hosp Day # 4 PCP Ysabel Villa MD       Subjective:   Ayla Olivares 138 12/26/2019    K 4.3 12/26/2019     12/26/2019    CO2 25.0 12/26/2019     (H) 12/26/2019    CA 8.9 12/26/2019    ALB 1.8 (L) 12/25/2019    ALKPHO 87 12/25/2019    BILT 0.5 12/25/2019    TP 5.9 (L) 12/25/2019    AST 9 (L) 12/25/2019    ALT 9

## 2019-12-26 NOTE — PROGRESS NOTES
[facility]    Progress Note    Mike Para Patient Status:  Inpatient    1940 MRN [id number]   Location [facility] 5SW/SE Attending Alexandra Headley MD   Hosp Day # 4 PCP Keyla Ramirez MD        Subjective:     Constitu 12/25/2019    ALKPHO 87 12/25/2019    BILT 0.5 12/25/2019    TP 5.9 (L) 12/25/2019    AST 9 (L) 12/25/2019    ALT 9 (L) 12/25/2019    PTT 33.8 12/26/2019    TSH 2.760 12/25/2019    LIP 28 10/09/2017    MG 1.5 (L) 12/26/2019    TROP <0.045 12/24/2019

## 2019-12-26 NOTE — PHYSICAL THERAPY NOTE
PHYSICAL THERAPY TREATMENT NOTE - INPATIENT     Room Number: 548/548-A       Presenting Problem: acute cystitis w/hematuria; sepsis, back pain    Problem List  Principal Problem:    Acute cystitis with hematuria  Active Problems:    SIRS (systemic inflamma MOBILITY  How much difficulty does the patient currently have. ..  -   Turning over in bed (including adjusting bedclothes, sheets and blankets)?: A Lot   -   Sitting down on and standing up from a chair with arms (e.g., wheelchair, bedside commode, etc.): Current Status NT   Goal #5 Patient to demonstrate independence with home activity/exercise instructions provided to patient in preparation for discharge.    Goal #5   Current Status In progress   Goal #6    Goal #6  Current Status

## 2019-12-26 NOTE — CONSULTS
[facility] ID CONSULT NOTE    Mateusz Cameron Patient Status:  Inpatient    1940 MRN [id number]   Location [facility] 5SW/SE Attending Anthony Mac MD   Hosp Day # 4 PCP Afshan Barker MD       Reason for Co ESOPHAGOGASTRODUODENOSCOPY (EGD) N/A 9/21/2018    Performed by Bright Cobos MD at [facility] [address] [facility] side   • LUMBAR FACET INJECTION Right 8/13/2015    Performed by Wilton Taylor MD at [facility] MAIN OR   • LUMBAR FACET Pantoprazole Sodium (PROTONIX) EC tab 20 mg, 20 mg, Oral, QAM AC  •  Pravastatin Sodium (PRAVACHOL) tab 20 mg, 20 mg, Oral, Nightly  •  Alfuzosin HCl ER (UROXATRAL) 24 hr tab 10 mg, 10 mg, Oral, Daily with breakfast  •  Oxybutynin Chloride ER (DITROPAN-XL) Data:  Recent Labs   Lab 12/23/19  0310  12/26/19  0620   RBC 4.07   < > 4.08   HGB 11.8*   < > 11.6*   HCT 37.5*   < > 36.0*   MCV 92.1   < > 88.2   MCH 29.0   < > 28.4   MCHC 31.5   < > 32.2   RDW 17.1*   < > 16.6*   NEPRELIM 18.55*   < > 7.89*   WBC 23. perioperative antibotics  # BPH s/p TURP 9/26/19  # Chronic back pain  # HTN  # HLD    PLAN:  -  Continue on zosyn, day 5. Will start PO acyclovir for shingles. -  Contact isolation until lesions are crusted over. -  Follow fever curve, wbc.   -  Reviewed

## 2019-12-27 LAB
ANION GAP SERPL CALC-SCNC: 7 MMOL/L (ref 0–18)
BUN BLD-MCNC: 25 MG/DL (ref 7–18)
BUN/CREAT SERPL: 13.1 (ref 10–20)
CALCIUM BLD-MCNC: 9.1 MG/DL (ref 8.5–10.1)
CHLORIDE SERPL-SCNC: 106 MMOL/L (ref 98–112)
CO2 SERPL-SCNC: 26 MMOL/L (ref 21–32)
CREAT BLD-MCNC: 1.91 MG/DL (ref 0.7–1.3)
DEPRECATED RDW RBC AUTO: 54.1 FL (ref 35.1–46.3)
ERYTHROCYTE [DISTWIDTH] IN BLOOD BY AUTOMATED COUNT: 16.5 % (ref 11–15)
GLUCOSE BLD-MCNC: 100 MG/DL (ref 70–99)
HAV IGM SER QL: 1.6 MG/DL (ref 1.6–2.6)
HCT VFR BLD AUTO: 38.9 % (ref 39–53)
HGB BLD-MCNC: 12.6 G/DL (ref 13–17.5)
MCH RBC QN AUTO: 28.8 PG (ref 26–34)
MCHC RBC AUTO-ENTMCNC: 32.4 G/DL (ref 31–37)
MCV RBC AUTO: 89 FL (ref 80–100)
OSMOLALITY SERPL CALC.SUM OF ELEC: 292 MOSM/KG (ref 275–295)
PLATELET # BLD AUTO: 212 10(3)UL (ref 150–450)
POTASSIUM SERPL-SCNC: 4.2 MMOL/L (ref 3.5–5.1)
RBC # BLD AUTO: 4.37 X10(6)UL (ref 3.8–5.8)
SODIUM SERPL-SCNC: 139 MMOL/L (ref 136–145)
WBC # BLD AUTO: 11.4 X10(3) UL (ref 4–11)

## 2019-12-27 RX ORDER — MAGNESIUM OXIDE 400 MG (241.3 MG MAGNESIUM) TABLET
400 TABLET ONCE
Status: COMPLETED | OUTPATIENT
Start: 2019-12-27 | End: 2019-12-27

## 2019-12-27 RX ORDER — BISACODYL 10 MG
10 SUPPOSITORY, RECTAL RECTAL
Status: DISCONTINUED | OUTPATIENT
Start: 2019-12-27 | End: 2020-01-08

## 2019-12-27 NOTE — CM/SW NOTE
YOVANNY met with the pt. And his brothers At bedside. The pt. Lives with a roommate Kenyon Villarreal for the last 39 years. The pt. Reports being independent prior to admission with adls, ambulation and driving. Curtis doesn't drive. The pt.  Uses a walker and/or a cane

## 2019-12-27 NOTE — PROGRESS NOTES
[facility]    Progress Note    Leah Morris Patient Status:  Inpatient    1940 MRN [id number]   Location [facility] 5SW/SE Attending Martin Dwyer MD   Hosp Day # 5 PCP Mino Nam MD       Subjective:   Leah Morris Component Value Date    WBC 11.4 (H) 12/27/2019    HGB 12.6 (L) 12/27/2019    HCT 38.9 (L) 12/27/2019    .0 12/27/2019    CREATSERUM 1.91 (H) 12/27/2019    BUN 25 (H) 12/27/2019     12/27/2019    K 4.2 12/27/2019     12/27/2019    CO2

## 2019-12-27 NOTE — PROGRESS NOTES
[facility] ID PROGRESS NOTE    Mahesh Polanco Patient Status:  Inpatient    1940 MRN [id number]   Location [facility] 5SW/SE Attending Adelaida Jung MD   Hosp Day # 5 PCP Elsie Gee MD     Subjective:  Awake, no posterior R arm pain.  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 Can likely transition to PO bactrim upon DC for extended course for prostatitis. Continue on PO acyclovir but can transition to PO valtrex on DC to complete ten day course (EOT 1/5/20). -  Contact isolation until lesions are crusted over.

## 2019-12-27 NOTE — PROGRESS NOTES
Patient seen in follow up. Patient denies any chest pain or sob.  Remains in afib.   12/27/19  1706   BP: 133/60   Pulse: 97   Resp: 18   Temp:        Intake/Output Summary (Last 24 hours) at 12/27/2019 1735  Last data filed at 12/27/2019 1015  Gross Oxybutynin Chloride ER (DITROPAN-XL) 24 hr tab 10 mg, 10 mg, Oral, Daily  traZODone HCl (DESYREL) tab 50 mg, 50 mg, Oral, Nightly  acetaminophen (TYLENOL EXTRA STRENGTH) tab 500 mg, 500 mg, Oral, Q4H PRN  Normal Saline Flush 0.9 % injection 3 mL, 3 mL, Int Methylcellulose, Laxative, 500 MG Oral Tab, Take 1 tablet by mouth daily. Naproxen Sodium 220 MG Oral Cap, Take 220 mg by mouth as needed. AmLODIPine Besylate (NORVASC) 2.5 MG Oral Tab, Take 2.5 mg by mouth daily.   furosemide (LASIX) 20 MG Oral Tab, Ta

## 2019-12-27 NOTE — PLAN OF CARE
Problem: Patient Centered Care  Goal: Patient preferences are identified and integrated in the patient's plan of care  Description  Interventions:  - What would you like us to know as we care for you? \"I am from home with my friend. \"    Problem: CARDIO INTEGRITY - ADULT  Goal: Skin integrity remains intact  Description  INTERVENTIONS  - Assess and document risk factors for pressure ulcer development  - Assess and document skin integrity  - Monitor for areas of redness and/or skin breakdown  - Initiate in participate in care and decision-making at the level they choose  - Honor patient and family perspectives and choices  Outcome: Progressing     Problem: Patient/Family Goals  Goal: Patient/Family Long Term Goal  Description  Patient's Long Term Goal: to go

## 2019-12-27 NOTE — PLAN OF CARE
Problem: Patient Centered Care  Goal: Patient preferences are identified and integrated in the patient's plan of care  Description  Interventions:  - What would you like us to know as we care for you?   - Provide timely, complete, and accurate informatio scan as needed  - Follow urinary retention protocol/standard of care  - Consider collaborating with pharmacy to review patient's medication profile  - Implement strategies to promote bladder emptying  Outcome: Progressing     Problem: METABOLIC/FLUID AND E stated pain goal  Description  INTERVENTIONS:  - Encourage pt to monitor pain and request assistance  - Assess pain using appropriate pain scale  - Administer analgesics based on type and severity of pain and evaluate response  - Implement non-pharmacologi

## 2019-12-28 ENCOUNTER — APPOINTMENT (OUTPATIENT)
Dept: MRI IMAGING | Facility: HOSPITAL | Age: 79
DRG: 856 | End: 2019-12-28
Attending: FAMILY MEDICINE
Payer: MEDICARE

## 2019-12-28 LAB
ANION GAP SERPL CALC-SCNC: 5 MMOL/L (ref 0–18)
BASOPHILS # BLD AUTO: 0.03 X10(3) UL (ref 0–0.2)
BASOPHILS NFR BLD AUTO: 0.2 %
BUN BLD-MCNC: 21 MG/DL (ref 7–18)
BUN/CREAT SERPL: 11.7 (ref 10–20)
CALCIUM BLD-MCNC: 9.2 MG/DL (ref 8.5–10.1)
CHLORIDE SERPL-SCNC: 105 MMOL/L (ref 98–112)
CO2 SERPL-SCNC: 28 MMOL/L (ref 21–32)
CREAT BLD-MCNC: 1.8 MG/DL (ref 0.7–1.3)
DEPRECATED RDW RBC AUTO: 53.5 FL (ref 35.1–46.3)
EOSINOPHIL # BLD AUTO: 0.23 X10(3) UL (ref 0–0.7)
EOSINOPHIL NFR BLD AUTO: 1.8 %
ERYTHROCYTE [DISTWIDTH] IN BLOOD BY AUTOMATED COUNT: 16.4 % (ref 11–15)
GLUCOSE BLD-MCNC: 108 MG/DL (ref 70–99)
HAV IGM SER QL: 1.6 MG/DL (ref 1.6–2.6)
HCT VFR BLD AUTO: 38.1 % (ref 39–53)
HGB BLD-MCNC: 12.1 G/DL (ref 13–17.5)
IMM GRANULOCYTES # BLD AUTO: 0.23 X10(3) UL (ref 0–1)
IMM GRANULOCYTES NFR BLD: 1.8 %
LYMPHOCYTES # BLD AUTO: 1.93 X10(3) UL (ref 1–4)
LYMPHOCYTES NFR BLD AUTO: 14.8 %
MCH RBC QN AUTO: 28.1 PG (ref 26–34)
MCHC RBC AUTO-ENTMCNC: 31.8 G/DL (ref 31–37)
MCV RBC AUTO: 88.4 FL (ref 80–100)
MONOCYTES # BLD AUTO: 0.55 X10(3) UL (ref 0.1–1)
MONOCYTES NFR BLD AUTO: 4.2 %
NEUTROPHILS # BLD AUTO: 10.05 X10 (3) UL (ref 1.5–7.7)
NEUTROPHILS # BLD AUTO: 10.05 X10(3) UL (ref 1.5–7.7)
NEUTROPHILS NFR BLD AUTO: 77.2 %
OSMOLALITY SERPL CALC.SUM OF ELEC: 290 MOSM/KG (ref 275–295)
PLATELET # BLD AUTO: 255 10(3)UL (ref 150–450)
POTASSIUM SERPL-SCNC: 4 MMOL/L (ref 3.5–5.1)
RBC # BLD AUTO: 4.31 X10(6)UL (ref 3.8–5.8)
SODIUM SERPL-SCNC: 138 MMOL/L (ref 136–145)
WBC # BLD AUTO: 13 X10(3) UL (ref 4–11)

## 2019-12-28 PROCEDURE — 72158 MRI LUMBAR SPINE W/O & W/DYE: CPT | Performed by: FAMILY MEDICINE

## 2019-12-28 RX ORDER — AMIODARONE HYDROCHLORIDE 200 MG/1
400 TABLET ORAL 2 TIMES DAILY WITH MEALS
Status: DISCONTINUED | OUTPATIENT
Start: 2019-12-29 | End: 2019-12-28

## 2019-12-28 RX ORDER — MAGNESIUM OXIDE 400 MG (241.3 MG MAGNESIUM) TABLET
400 TABLET ONCE
Status: COMPLETED | OUTPATIENT
Start: 2019-12-28 | End: 2019-12-28

## 2019-12-28 RX ORDER — DOCUSATE SODIUM 100 MG/1
100 CAPSULE, LIQUID FILLED ORAL 2 TIMES DAILY
Status: DISCONTINUED | OUTPATIENT
Start: 2019-12-28 | End: 2020-01-08

## 2019-12-28 RX ORDER — HYDROCODONE BITARTRATE AND ACETAMINOPHEN 5; 325 MG/1; MG/1
2 TABLET ORAL EVERY 6 HOURS PRN
Status: DISCONTINUED | OUTPATIENT
Start: 2019-12-28 | End: 2020-01-08

## 2019-12-28 RX ORDER — METOPROLOL TARTRATE 100 MG/1
100 TABLET ORAL 2 TIMES DAILY
Status: DISCONTINUED | OUTPATIENT
Start: 2019-12-28 | End: 2020-01-03

## 2019-12-28 RX ORDER — DILTIAZEM HYDROCHLORIDE 120 MG/1
120 CAPSULE, EXTENDED RELEASE ORAL DAILY
Status: DISCONTINUED | OUTPATIENT
Start: 2019-12-29 | End: 2020-01-05

## 2019-12-28 NOTE — PLAN OF CARE
Problem: Patient Centered Care  Goal: Patient preferences are identified and integrated in the patient's plan of care  Description  Interventions:  - What would you like us to know as we care for you?  I live with my best friend, Jaciel Anderson.  - Provide timely, intake/output and perform bladder scan as needed  - Follow urinary retention protocol/standard of care  - Consider collaborating with pharmacy to review patient's medication profile  - Implement strategies to promote bladder emptying  Outcome: Progressing comfort level or patient's stated pain goal  Description  INTERVENTIONS:  - Encourage pt to monitor pain and request assistance  - Assess pain using appropriate pain scale  - Administer analgesics based on type and severity of pain and evaluate response  -

## 2019-12-28 NOTE — PLAN OF CARE
Problem: Patient Centered Care  Goal: Patient preferences are identified and integrated in the patient's plan of care  Description  Interventions:  - What would you like us to know as we care for you? \"I am from home with my friend. \"  - Provide timely, intake/output and perform bladder scan as needed  - Follow urinary retention protocol/standard of care  - Consider collaborating with pharmacy to review patient's medication profile  - Implement strategies to promote bladder emptying  Outcome: Progressing comfort level or patient's stated pain goal  Description  INTERVENTIONS:  - Encourage pt to monitor pain and request assistance  - Assess pain using appropriate pain scale  - Administer analgesics based on type and severity of pain and evaluate response  -

## 2019-12-28 NOTE — PLAN OF CARE
Spoke to Dr. Anguiano Fairly in regards to patient's request for an increase in pain medication and a stool softner. New orders for colace and 10/325 norco. The patient's family is requesting discharge on Monday.  Per family, they want to be available to help the pat

## 2019-12-28 NOTE — PROGRESS NOTES
[facility] HOSP - [facility]    Progress Note    Sofia Parson Patient Status:  Inpatient    1940 MRN [id number]   [facility] Attending Ese Clinton MD   Hosp Day # 6 PCP Isauro Guthrie MD       Subjective:   Sofia Parson during dictation, discrepancies may still exist.     Results:     Lab Results   Component Value Date    WBC 13.0 (H) 12/28/2019    HGB 12.1 (L) 12/28/2019    HCT 38.1 (L) 12/28/2019    .0 12/28/2019    CREATSERUM 1.80 (H) 12/28/2019    BUN 21 (H) 12

## 2019-12-29 LAB
ANION GAP SERPL CALC-SCNC: 6 MMOL/L (ref 0–18)
BASOPHILS # BLD AUTO: 0.04 X10(3) UL (ref 0–0.2)
BASOPHILS NFR BLD AUTO: 0.3 %
BUN BLD-MCNC: 19 MG/DL (ref 7–18)
BUN/CREAT SERPL: 10.6 (ref 10–20)
CALCIUM BLD-MCNC: 9.3 MG/DL (ref 8.5–10.1)
CHLORIDE SERPL-SCNC: 104 MMOL/L (ref 98–112)
CO2 SERPL-SCNC: 28 MMOL/L (ref 21–32)
CREAT BLD-MCNC: 1.79 MG/DL (ref 0.7–1.3)
DEPRECATED RDW RBC AUTO: 53.2 FL (ref 35.1–46.3)
EOSINOPHIL # BLD AUTO: 0.3 X10(3) UL (ref 0–0.7)
EOSINOPHIL NFR BLD AUTO: 2.3 %
ERYTHROCYTE [DISTWIDTH] IN BLOOD BY AUTOMATED COUNT: 16.3 % (ref 11–15)
GLUCOSE BLD-MCNC: 100 MG/DL (ref 70–99)
HAV IGM SER QL: 1.8 MG/DL (ref 1.6–2.6)
HCT VFR BLD AUTO: 38.3 % (ref 39–53)
HGB BLD-MCNC: 12.2 G/DL (ref 13–17.5)
IMM GRANULOCYTES # BLD AUTO: 0.16 X10(3) UL (ref 0–1)
IMM GRANULOCYTES NFR BLD: 1.2 %
LYMPHOCYTES # BLD AUTO: 1.94 X10(3) UL (ref 1–4)
LYMPHOCYTES NFR BLD AUTO: 14.6 %
MCH RBC QN AUTO: 28.5 PG (ref 26–34)
MCHC RBC AUTO-ENTMCNC: 31.9 G/DL (ref 31–37)
MCV RBC AUTO: 89.5 FL (ref 80–100)
MONOCYTES # BLD AUTO: 0.58 X10(3) UL (ref 0.1–1)
MONOCYTES NFR BLD AUTO: 4.4 %
NEUTROPHILS # BLD AUTO: 10.3 X10 (3) UL (ref 1.5–7.7)
NEUTROPHILS # BLD AUTO: 10.3 X10(3) UL (ref 1.5–7.7)
NEUTROPHILS NFR BLD AUTO: 77.2 %
OSMOLALITY SERPL CALC.SUM OF ELEC: 288 MOSM/KG (ref 275–295)
PLATELET # BLD AUTO: 258 10(3)UL (ref 150–450)
POTASSIUM SERPL-SCNC: 4.2 MMOL/L (ref 3.5–5.1)
RBC # BLD AUTO: 4.28 X10(6)UL (ref 3.8–5.8)
SODIUM SERPL-SCNC: 138 MMOL/L (ref 136–145)
WBC # BLD AUTO: 13.3 X10(3) UL (ref 4–11)

## 2019-12-29 RX ORDER — MAGNESIUM OXIDE 400 MG (241.3 MG MAGNESIUM) TABLET
400 TABLET ONCE
Status: DISCONTINUED | OUTPATIENT
Start: 2019-12-29 | End: 2019-12-29

## 2019-12-29 NOTE — PROGRESS NOTES
[facility] - [facility]    Progress Note    Suleman Barroso Patient Status:  Inpatient    1940 MRN [id number]   Location [facility] 5SW/SE Attending Sheron Joe MD   Hosp Day # 7 PCP Jammie Soto MD       Subjective:   Suleman Barroso hrs for biopsy by I.RGarret NPO after midnight Monday NIGHT. This note was partially prepared using [employer] voice recognition dictation software. As a result, errors may occur. When identified, these errors have been corrected.  While every attempt 15:44                  Darrell Wilkinson MD  12/29/2019

## 2019-12-29 NOTE — PROGRESS NOTES
[facility] ID PROGRESS NOTE    Margarette Macedo Patient Status:  Inpatient    1940 MRN [id number]   Location [facility] 5SW/SE Attending Darrell Kwon MD   Hosp Day # 7 PCP Roseline Smith MD     Subjective:  No fever. HDS. Awake and alert.  Comfor mild-mod, C5-6 right mod foraminal bulging discs     C4-5 right mod, C5-6 right mod foraminal stenosis     History of colon polyps     History of prostate disorder     Acute pain of left shoulder     Dysfunction of left rotator cuff     Chronic constipatio signs of disseminated shingles               -Patient reports previous vaccine   # Acute prostatitis, urine cx with no growth               -Blood cx NGTD  # Acute sepsis 2/2  source  # CHANTALE  # Prostate cancer s/p transrectal prostate bx 12/18 on perioper

## 2019-12-29 NOTE — PLAN OF CARE
Per Dr. Minaya Abide is discontinued until after the bone biopsy on Tuesday. Patient is to be NPO Monday starting at midnight.

## 2019-12-29 NOTE — PROGRESS NOTES
Patient seen in follow up. Patient denies any chest pain or sob.  Remains in afib.   12/28/19  1537   BP: 150/86   Pulse: 83   Resp: 20   Temp: 98 °F (36.7 °C)       Intake/Output Summary (Last 24 hours) at 12/28/2019 2018  Last data filed at 12/28/20 Pravastatin Sodium (PRAVACHOL) tab 20 mg, 20 mg, Oral, Nightly  Alfuzosin HCl ER (UROXATRAL) 24 hr tab 10 mg, 10 mg, Oral, Daily with breakfast  Oxybutynin Chloride ER (DITROPAN-XL) 24 hr tab 10 mg, 10 mg, Oral, Daily  traZODone HCl (DESYREL) tab 50 mg, 50 omeprazole (PRILOSEC) 20 MG Oral Capsule Delayed Release, Take 20 mg by mouth daily. Pravastatin Sodium (PRAVACHOL) 40 MG Oral Tab, Take 20 mg by mouth nightly. aspirin 81 MG Oral Tab, Take 81 mg by mouth daily.   Methylcellulose, Laxative, 500 MG Oral Ta Afib with rvr, improved  HTN, uncontrolled but now better  CHANTALE    PLAN:  Remains in afib. Change to rate control strategy. Stop amio.  Start cardizem cd 120.d        Jose Antonio TRÄSLÖVSLÄGE, DO [address] [place] [address]  Phone: [phone]

## 2019-12-29 NOTE — PLAN OF CARE
Problem: Patient Centered Care  Goal: Patient preferences are identified and integrated in the patient's plan of care  Description  Interventions:  - What would you like us to know as we care for you?  \" I live at home with my friend\"  - Provide timely, intake/output and perform bladder scan as needed  - Follow urinary retention protocol/standard of care  - Consider collaborating with pharmacy to review patient's medication profile  - Implement strategies to promote bladder emptying  Outcome: Progressing comfort level or patient's stated pain goal  Description  INTERVENTIONS:  - Encourage pt to monitor pain and request assistance  - Assess pain using appropriate pain scale  - Administer analgesics based on type and severity of pain and evaluate response  -

## 2019-12-30 ENCOUNTER — TELEPHONE (OUTPATIENT)
Dept: GASTROENTEROLOGY | Facility: CLINIC | Age: 79
End: 2019-12-30

## 2019-12-30 LAB
ANION GAP SERPL CALC-SCNC: 5 MMOL/L (ref 0–18)
BUN BLD-MCNC: 23 MG/DL (ref 7–18)
BUN/CREAT SERPL: 12.5 (ref 10–20)
CALCIUM BLD-MCNC: 9.5 MG/DL (ref 8.5–10.1)
CHLORIDE SERPL-SCNC: 101 MMOL/L (ref 98–112)
CO2 SERPL-SCNC: 30 MMOL/L (ref 21–32)
CREAT BLD-MCNC: 1.84 MG/DL (ref 0.7–1.3)
CRP SERPL-MCNC: 9.98 MG/DL (ref ?–0.3)
DEPRECATED RDW RBC AUTO: 54.4 FL (ref 35.1–46.3)
ERYTHROCYTE [DISTWIDTH] IN BLOOD BY AUTOMATED COUNT: 16.4 % (ref 11–15)
ERYTHROCYTE [SEDIMENTATION RATE] IN BLOOD: 73 MM/HR (ref 0–20)
GLUCOSE BLD-MCNC: 105 MG/DL (ref 70–99)
HAV IGM SER QL: 1.9 MG/DL (ref 1.6–2.6)
HCT VFR BLD AUTO: 41.2 % (ref 39–53)
HGB BLD-MCNC: 13.1 G/DL (ref 13–17.5)
MCH RBC QN AUTO: 28.9 PG (ref 26–34)
MCHC RBC AUTO-ENTMCNC: 31.8 G/DL (ref 31–37)
MCV RBC AUTO: 90.7 FL (ref 80–100)
OSMOLALITY SERPL CALC.SUM OF ELEC: 286 MOSM/KG (ref 275–295)
PLATELET # BLD AUTO: 308 10(3)UL (ref 150–450)
POTASSIUM SERPL-SCNC: 4 MMOL/L (ref 3.5–5.1)
RBC # BLD AUTO: 4.54 X10(6)UL (ref 3.8–5.8)
SODIUM SERPL-SCNC: 136 MMOL/L (ref 136–145)
WBC # BLD AUTO: 13.3 X10(3) UL (ref 4–11)

## 2019-12-30 NOTE — OCCUPATIONAL THERAPY NOTE
Patient with Possible early discitis/osteomyelitis L3-L4- RN requesting hold; pt to have biopsy to r/o- will follow up later schedule permitting.      Annalisa Romero, OTR/L   [facility]

## 2019-12-30 NOTE — CM/SW NOTE
MDO to SW for advanced directives. Pt brother ([phone])  Clark Swenson is current POA. Pt is requesting POA to be changed to his sister, Niranjan Aguilar ([phone]). SW met with pt and provided POA documents for pt to sign.     SW/CM to remain available for suppor

## 2019-12-30 NOTE — TELEPHONE ENCOUNTER
----- Message from Shante Armstrnog RN sent at 9/17/2019  9:38 AM CDT -----  Regarding: egd recall  Recall EGD with Dr Jessica Narvaez 6 months--due Feb 29, 2020

## 2019-12-30 NOTE — PROGRESS NOTES
[facility] ID PROGRESS NOTE    Narendra Keller Patient Status:  Inpatient    1940 MRN [id number]   Location [facility] 5SW/SE Attending Tara Pack MD   Hosp Day # 8 PCP Debi Sidhu MD     Subjective:  Awake, sitting in chair.  Back pain con History of prostate disorder     Acute pain of left shoulder     Dysfunction of left rotator cuff     Chronic constipation     Pelvic floor dysfunction     Gastroesophageal reflux disease     Acute pain of right shoulder     L4-5 mod central stenosis     A no growth               -Blood cx NGTD  # Acute sepsis 2/2  source  # CHANTALE  # Prostate cancer s/p transrectal prostate bx 12/18 on perioperative antibotics  # BPH s/p TURP 9/26/19  # Chronic back pain  # HTN  # HLD     PLAN:  -  Continue on zosyn, day 10.

## 2019-12-30 NOTE — PROGRESS NOTES
[facility]    Progress Note    Kalpana Velasco Patient Status:  Inpatient    1940 MRN [id number]   Location [facility] 5SW/SE Attending Jalen Hernandez MD   Hosp Day # 8 PCP Tamara Yan MD       Subjective:   Kalpana Velasco [employer] voice recognition dictation software. As a result, errors may occur. When identified, these errors have been corrected.  While every attempt is made to correct errors during dictation, discrepancies may still exist.     Results:     Lab Resul

## 2019-12-30 NOTE — PROGRESS NOTES
Patient seen in follow up. Patient denies any chest pain or sob.  Remains in afib.   12/29/19  0855   BP: 118/67   Pulse:    Resp: 20   Temp: 97.8 °F (36.6 °C)       Intake/Output Summary (Last 24 hours) at 12/29/2019 3812  Last data filed at 12/29/20 Alfuzosin HCl ER (UROXATRAL) 24 hr tab 10 mg, 10 mg, Oral, Daily with breakfast  Oxybutynin Chloride ER (DITROPAN-XL) 24 hr tab 10 mg, 10 mg, Oral, Daily  traZODone HCl (DESYREL) tab 50 mg, 50 mg, Oral, Nightly  acetaminophen (TYLENOL EXTRA STRENGTH) tab 5 Pravastatin Sodium (PRAVACHOL) 40 MG Oral Tab, Take 20 mg by mouth nightly. aspirin 81 MG Oral Tab, Take 81 mg by mouth daily. Methylcellulose, Laxative, 500 MG Oral Tab, Take 1 tablet by mouth daily.     Naproxen Sodium 220 MG Oral Cap, Take 220 mg by mo Remains in afib. Eliquis on hold for biopsy. Jose Antonio TRÄSLÖVSLÄGE, DO FAC  [address] [facility], [facility]  Phone: [phone]  [email or web address]

## 2019-12-30 NOTE — CM/SW NOTE
Per Paxton Alfred @ [address] [place] [address] pt is accepted. Plan:  DC to [address] [place] [address]. SW/CM to remain available for support and/or discharge planning.      DANICA Jc, [facility]  Social Work   VDP:#[phone]

## 2019-12-30 NOTE — PROGRESS NOTES
Patient seen in follow up. Patient denies any chest pain or sob.    12/30/19  0800   BP: 142/63   Pulse: 65   Resp: 18   Temp: 97.6 °F (36.4 °C)       Intake/Output Summary (Last 24 hours) at 12/30/2019 3865  Last data filed at 12/30/2019 0900  Gross Alfuzosin HCl ER (UROXATRAL) 24 hr tab 10 mg, 10 mg, Oral, Daily with breakfast  Oxybutynin Chloride ER (DITROPAN-XL) 24 hr tab 10 mg, 10 mg, Oral, Daily  traZODone HCl (DESYREL) tab 50 mg, 50 mg, Oral, Nightly  acetaminophen (TYLENOL EXTRA STRENGTH) tab 5 Pravastatin Sodium (PRAVACHOL) 40 MG Oral Tab, Take 20 mg by mouth nightly. aspirin 81 MG Oral Tab, Take 81 mg by mouth daily. Methylcellulose, Laxative, 500 MG Oral Tab, Take 1 tablet by mouth daily.     Naproxen Sodium 220 MG Oral Cap, Take 220 mg by mo Remains in afib. Eliquis on hold for biopsy. 12/30/19 HR controlled, on metoprolol, Cardizem; eliquis held, can be restarted after the biopsy,           Thank you for allowing me to participate in the care of your patient.  please call if you have any

## 2019-12-31 ENCOUNTER — APPOINTMENT (OUTPATIENT)
Dept: INTERVENTIONAL RADIOLOGY/VASCULAR | Facility: HOSPITAL | Age: 79
DRG: 856 | End: 2019-12-31
Attending: PHYSICIAN ASSISTANT
Payer: MEDICARE

## 2019-12-31 LAB
ALBUMIN SERPL-MCNC: 2.3 G/DL (ref 3.4–5)
ALBUMIN/GLOB SERPL: 0.5 {RATIO} (ref 1–2)
ALP LIVER SERPL-CCNC: 125 U/L (ref 45–117)
ALT SERPL-CCNC: 14 U/L (ref 16–61)
ANION GAP SERPL CALC-SCNC: 6 MMOL/L (ref 0–18)
AST SERPL-CCNC: 20 U/L (ref 15–37)
BASOPHILS # BLD AUTO: 0.05 X10(3) UL (ref 0–0.2)
BASOPHILS NFR BLD AUTO: 0.3 %
BILIRUB SERPL-MCNC: 0.3 MG/DL (ref 0.1–2)
BUN BLD-MCNC: 23 MG/DL (ref 7–18)
BUN/CREAT SERPL: 12.4 (ref 10–20)
CALCIUM BLD-MCNC: 9.6 MG/DL (ref 8.5–10.1)
CHLORIDE SERPL-SCNC: 102 MMOL/L (ref 98–112)
CO2 SERPL-SCNC: 29 MMOL/L (ref 21–32)
CREAT BLD-MCNC: 1.86 MG/DL (ref 0.7–1.3)
DEPRECATED RDW RBC AUTO: 53.1 FL (ref 35.1–46.3)
EOSINOPHIL # BLD AUTO: 0.14 X10(3) UL (ref 0–0.7)
EOSINOPHIL NFR BLD AUTO: 0.9 %
ERYTHROCYTE [DISTWIDTH] IN BLOOD BY AUTOMATED COUNT: 16.2 % (ref 11–15)
GLOBULIN PLAS-MCNC: 5.1 G/DL (ref 2.8–4.4)
GLUCOSE BLD-MCNC: 104 MG/DL (ref 70–99)
HAV IGM SER QL: 2 MG/DL (ref 1.6–2.6)
HCT VFR BLD AUTO: 42.1 % (ref 39–53)
HGB BLD-MCNC: 13.4 G/DL (ref 13–17.5)
IMM GRANULOCYTES # BLD AUTO: 0.12 X10(3) UL (ref 0–1)
IMM GRANULOCYTES NFR BLD: 0.8 %
LYMPHOCYTES # BLD AUTO: 1.63 X10(3) UL (ref 1–4)
LYMPHOCYTES NFR BLD AUTO: 10.9 %
M PROTEIN MFR SERPL ELPH: 7.4 G/DL (ref 6.4–8.2)
MCH RBC QN AUTO: 28.3 PG (ref 26–34)
MCHC RBC AUTO-ENTMCNC: 31.8 G/DL (ref 31–37)
MCV RBC AUTO: 89 FL (ref 80–100)
MONOCYTES # BLD AUTO: 0.42 X10(3) UL (ref 0.1–1)
MONOCYTES NFR BLD AUTO: 2.8 %
NEUTROPHILS # BLD AUTO: 12.55 X10 (3) UL (ref 1.5–7.7)
NEUTROPHILS # BLD AUTO: 12.55 X10(3) UL (ref 1.5–7.7)
NEUTROPHILS NFR BLD AUTO: 84.3 %
OSMOLALITY SERPL CALC.SUM OF ELEC: 288 MOSM/KG (ref 275–295)
PLATELET # BLD AUTO: 349 10(3)UL (ref 150–450)
POTASSIUM SERPL-SCNC: 4.1 MMOL/L (ref 3.5–5.1)
RBC # BLD AUTO: 4.73 X10(6)UL (ref 3.8–5.8)
SODIUM SERPL-SCNC: 137 MMOL/L (ref 136–145)
WBC # BLD AUTO: 14.9 X10(3) UL (ref 4–11)

## 2019-12-31 PROCEDURE — 0QB03ZX EXCISION OF LUMBAR VERTEBRA, PERCUTANEOUS APPROACH, DIAGNOSTIC: ICD-10-PCS | Performed by: RADIOLOGY

## 2019-12-31 RX ORDER — MIDAZOLAM HYDROCHLORIDE 1 MG/ML
INJECTION INTRAMUSCULAR; INTRAVENOUS
Status: COMPLETED
Start: 2019-12-31 | End: 2019-12-31

## 2019-12-31 RX ORDER — LIDOCAINE HYDROCHLORIDE 20 MG/ML
INJECTION, SOLUTION EPIDURAL; INFILTRATION; INTRACAUDAL; PERINEURAL
Status: COMPLETED
Start: 2019-12-31 | End: 2019-12-31

## 2019-12-31 NOTE — PLAN OF CARE
Problem: Patient Centered Care  Goal: Patient preferences are identified and integrated in the patient's plan of care  Description  Interventions:  - What would you like us to know as we care for you?  Lives with friend сергей  - Provide timely, complete, intake/output and perform bladder scan as needed  - Follow urinary retention protocol/standard of care  - Consider collaborating with pharmacy to review patient's medication profile  - Implement strategies to promote bladder emptying  Outcome: Progressing comfort level or patient's stated pain goal  Description  INTERVENTIONS:  - Encourage pt to monitor pain and request assistance  - Assess pain using appropriate pain scale  - Administer analgesics based on type and severity of pain and evaluate response  -

## 2019-12-31 NOTE — CM/SW NOTE
1/6 935am: Updated information has been sent to [facility]. Facundo Fisher has accepted the pt. When he is medically stable for discharge.   -----------------------  12/31 134pm: YOVANNY met with the pt. And addressed the POA and made changes.   Original and copie

## 2019-12-31 NOTE — PRE-SEDATION ASSESSMENT
[facility]  IR Pre-Procedure Sedation Assessment    History of snoring or sleep or apnea?    No    History of previous problems with anesthesia or sedation  No    Physical Findings:  Neck: nl ROM  CV: RRR  PULM: normal respiratory rate/effor

## 2019-12-31 NOTE — PROGRESS NOTES
Patient seen in follow up. Patient denies any chest pain or sob.    12/31/19  1016   BP: 127/68   Pulse: 70   Resp: 20   Temp: 97.9 °F (36.6 °C)       Intake/Output Summary (Last 24 hours) at 12/31/2019 1300  Last data filed at 12/31/2019 1523  Gross Alfuzosin HCl ER (UROXATRAL) 24 hr tab 10 mg, 10 mg, Oral, Daily with breakfast  Oxybutynin Chloride ER (DITROPAN-XL) 24 hr tab 10 mg, 10 mg, Oral, Daily  traZODone HCl (DESYREL) tab 50 mg, 50 mg, Oral, Nightly  acetaminophen (TYLENOL EXTRA STRENGTH) tab 5 Pravastatin Sodium (PRAVACHOL) 40 MG Oral Tab, Take 20 mg by mouth nightly. aspirin 81 MG Oral Tab, Take 81 mg by mouth daily. Methylcellulose, Laxative, 500 MG Oral Tab, Take 1 tablet by mouth daily.     Naproxen Sodium 220 MG Oral Cap, Take 220 mg by mo

## 2019-12-31 NOTE — PROGRESS NOTES
[facility]    Progress Note    Santosh Regan Patient Status:  Inpatient    1940 MRN [id number]   Location [facility] 5SW/SE Attending Modesto Gregorio MD   Hosp Day # 9 PCP Paige Paulino MD       Subjective:   Santosh Regan dictation software. As a result, errors may occur. When identified, these errors have been corrected.  While every attempt is made to correct errors during dictation, discrepancies may still exist.     Results:     Lab Results   Component Value Date    WBC

## 2019-12-31 NOTE — PROGRESS NOTES
[facility] ID PROGRESS NOTE    Kirti Reich Patient Status:  Inpatient    1940 MRN [id number]   Location [facility] 5SW/SE Attending Akilah Kohli MD   Hosp Day # 9 PCP Darrell Wilkinson MD     Subjective:  S/p bx.   No fever       Objective: History of colon polyps     History of prostate disorder     Acute pain of left shoulder     Dysfunction of left rotator cuff     Chronic constipation     Pelvic floor dysfunction     Gastroesophageal reflux disease     Acute pain of right shoulder     L4- prostatitis, urine cx with no growth               -Blood cx NGTD  # Acute sepsis 2/2  source  # CHANTALE  # Prostate cancer s/p transrectal prostate bx 12/18 on perioperative antibotics  # BPH s/p TURP 9/26/19  # Chronic back pain  # HTN  # HLD     PLAN:  -

## 2019-12-31 NOTE — PLAN OF CARE
Problem: Patient Centered Care  Goal: Patient preferences are identified and integrated in the patient's plan of care  Description  Interventions:  - What would you like us to know as we care for you?  Lives with friend сергей  - Provide timely, complete, Problem: METABOLIC/FLUID AND ELECTROLYTES - ADULT  Goal: Hemodynamic stability and optimal renal function maintained  Description  INTERVENTIONS:  - Monitor labs and assess for signs and symptoms of volume excess or deficit  - Monitor intake, output and

## 2019-12-31 NOTE — PHYSICAL THERAPY NOTE
Attempted physical therapy treatment. Patient going to IR for lumbar spine biopsy. Will defer physical therapy treatment at this time pending results of biopsy and further orders. RN Cheri aware of attempt.

## 2020-01-01 ENCOUNTER — APPOINTMENT (OUTPATIENT)
Dept: GENERAL RADIOLOGY | Facility: HOSPITAL | Age: 80
DRG: 856 | End: 2020-01-01
Attending: EMERGENCY MEDICINE
Payer: MEDICARE

## 2020-01-01 ENCOUNTER — APPOINTMENT (OUTPATIENT)
Dept: CT IMAGING | Facility: HOSPITAL | Age: 80
DRG: 856 | End: 2020-01-01
Attending: INTERNAL MEDICINE
Payer: MEDICARE

## 2020-01-01 LAB
ANION GAP SERPL CALC-SCNC: 9 MMOL/L (ref 0–18)
BASOPHILS # BLD AUTO: 0.02 X10(3) UL (ref 0–0.2)
BASOPHILS NFR BLD AUTO: 0.1 %
BUN BLD-MCNC: 27 MG/DL (ref 7–18)
BUN/CREAT SERPL: 14.5 (ref 10–20)
CALCIUM BLD-MCNC: 11.1 MG/DL (ref 8.5–10.1)
CHLORIDE SERPL-SCNC: 95 MMOL/L (ref 98–112)
CO2 SERPL-SCNC: 31 MMOL/L (ref 21–32)
CREAT BLD-MCNC: 1.86 MG/DL (ref 0.7–1.3)
DEPRECATED RDW RBC AUTO: 52.4 FL (ref 35.1–46.3)
EOSINOPHIL # BLD AUTO: 0.02 X10(3) UL (ref 0–0.7)
EOSINOPHIL NFR BLD AUTO: 0.1 %
ERYTHROCYTE [DISTWIDTH] IN BLOOD BY AUTOMATED COUNT: 16.4 % (ref 11–15)
GLUCOSE BLD-MCNC: 128 MG/DL (ref 70–99)
GLUCOSE BLDC GLUCOMTR-MCNC: 132 MG/DL (ref 70–99)
HAV IGM SER QL: 2.1 MG/DL (ref 1.6–2.6)
HCT VFR BLD AUTO: 47.3 % (ref 39–53)
HGB BLD-MCNC: 15 G/DL (ref 13–17.5)
IMM GRANULOCYTES # BLD AUTO: 0.07 X10(3) UL (ref 0–1)
IMM GRANULOCYTES NFR BLD: 0.5 %
LYMPHOCYTES # BLD AUTO: 1.15 X10(3) UL (ref 1–4)
LYMPHOCYTES NFR BLD AUTO: 8.2 %
MCH RBC QN AUTO: 28.1 PG (ref 26–34)
MCHC RBC AUTO-ENTMCNC: 31.7 G/DL (ref 31–37)
MCV RBC AUTO: 88.6 FL (ref 80–100)
MONOCYTES # BLD AUTO: 0.33 X10(3) UL (ref 0.1–1)
MONOCYTES NFR BLD AUTO: 2.4 %
NEUTROPHILS # BLD AUTO: 12.37 X10 (3) UL (ref 1.5–7.7)
NEUTROPHILS # BLD AUTO: 12.37 X10(3) UL (ref 1.5–7.7)
NEUTROPHILS NFR BLD AUTO: 88.7 %
OSMOLALITY SERPL CALC.SUM OF ELEC: 287 MOSM/KG (ref 275–295)
PLATELET # BLD AUTO: 486 10(3)UL (ref 150–450)
POTASSIUM SERPL-SCNC: 3.9 MMOL/L (ref 3.5–5.1)
RBC # BLD AUTO: 5.34 X10(6)UL (ref 3.8–5.8)
SODIUM SERPL-SCNC: 135 MMOL/L (ref 136–145)
WBC # BLD AUTO: 14 X10(3) UL (ref 4–11)

## 2020-01-01 PROCEDURE — 0D9670Z DRAINAGE OF STOMACH WITH DRAINAGE DEVICE, VIA NATURAL OR ARTIFICIAL OPENING: ICD-10-PCS | Performed by: FAMILY MEDICINE

## 2020-01-01 PROCEDURE — 74176 CT ABD & PELVIS W/O CONTRAST: CPT | Performed by: INTERNAL MEDICINE

## 2020-01-01 PROCEDURE — 99222 1ST HOSP IP/OBS MODERATE 55: CPT | Performed by: INTERNAL MEDICINE

## 2020-01-01 PROCEDURE — 74018 RADEX ABDOMEN 1 VIEW: CPT | Performed by: EMERGENCY MEDICINE

## 2020-01-01 RX ORDER — ONDANSETRON 2 MG/ML
4 INJECTION INTRAMUSCULAR; INTRAVENOUS ONCE
Status: COMPLETED | OUTPATIENT
Start: 2020-01-01 | End: 2020-01-01

## 2020-01-01 RX ORDER — SIMETHICONE 80 MG
80 TABLET,CHEWABLE ORAL 4 TIMES DAILY PRN
Status: DISCONTINUED | OUTPATIENT
Start: 2020-01-01 | End: 2020-01-08

## 2020-01-01 RX ORDER — METOCLOPRAMIDE HYDROCHLORIDE 5 MG/ML
5 INJECTION INTRAMUSCULAR; INTRAVENOUS EVERY 6 HOURS PRN
Status: DISCONTINUED | OUTPATIENT
Start: 2020-01-01 | End: 2020-01-08

## 2020-01-01 RX ORDER — DEXTROSE, SODIUM CHLORIDE, AND POTASSIUM CHLORIDE 5; .45; .15 G/100ML; G/100ML; G/100ML
INJECTION INTRAVENOUS CONTINUOUS
Status: DISCONTINUED | OUTPATIENT
Start: 2020-01-01 | End: 2020-01-08

## 2020-01-01 NOTE — PROGRESS NOTES
[facility] - [facility]    Progress Note    Charmaine Mcdonald Patient Status:  Inpatient    1940 MRN [id number]   Location [facility] 5SW/SE Attending Pepe Salinas MD   Hosp Day # 10 PCP Dennys Henao MD        Subjective:   Travon Magallanes could reflect ileus; bowel obstruction is a less likely differential diagnostic possibility. Continued follow-up is suggested. A preliminary report was issued by the [address] [place] [address] Radiology teleradiology service. There are no major discrepancies.    Dictated by

## 2020-01-01 NOTE — PLAN OF CARE
Dr. Kylah Tracey) called, informed patient's abdomen is distended, no c/o any abdominal pain, positive bowel sounds. No orders given at this time.

## 2020-01-01 NOTE — PROGRESS NOTES
[facility] Pharmacy Note:  Renal Dose Adjustment for Metoclopramide (REGLAN)    Marilynn Milner has been prescribed Metoclopramide (REGLAN) 10 mg every 6 hours as needed for n/v.    Estimated Creatinine Clearance: 36.5 mL/min (A) (based on SCr of 1.86 mg/dL (H)).

## 2020-01-01 NOTE — PLAN OF CARE
Dr. Sen Montana called(on call for Dr. Keshia Ward), notified patient's abdomen is firm, distended, no c/o any abdominal pain, bowel sounds present. Dr. Sen Montana informed I called the nocturnalist, no orders at this time.   Dr. Sen Montana ordered KUB to be done in the [facility]

## 2020-01-01 NOTE — CONSULTS
[facility]   Gastroenterology Consultation Note    Narendra Keller  Patient Status:    Inpatient  Date of Admission:         12/22/2019, Hospital day #10  Attending:   Tara Pack MD  PCP:     eDbi Sidhu MD    Reason for [facility] colon was examined upon withdrawal in the left lateral position.     Following colonoscopy, an Olympus adult HD gastroscope was inserted into the hypopharynx and advanced under direct vision into the esophagus, stomach and duodenum.   The endoscope was with    Recommendations:  - Post polypectomy instructions given  - Repeat colonoscopy in 3- 5 years  - High fiber diet for diverticular disease  - Symptomatic treatment of hemorrhoids  - Follow up on gastric polyps   ========================================== use drugs.     Allergies:    Latex                   RASH  Radiology Contrast *    RASH  Iodine (Topical)        RASH    Medications:    Current Facility-Administered Medications:   •  dextrose 5 % and 0.45 % NaCl with KCl 20 mEq infusion, , Intravenous, Co allopurinol (ZYLOPRIM) tab 200 mg, 200 mg, Oral, Daily  •  ondansetron HCl (ZOFRAN) injection 4 mg, 4 mg, Intravenous, Q4H PRN    Review of Systems:  CONSTITUTIONAL:  + fevers  EYES:  negative for diplopia or change in vision   RESPIRATORY:  negative for s Marked dilatation of the small and large bowel, which could reflect ileus; bowel obstruction is a less likely differential diagnostic possibility. Continued follow-up is suggested.     A preliminary report was issued by the [facility] teleradiology se

## 2020-01-01 NOTE — PHYSICAL THERAPY NOTE
Approached RN about pt participating in therapy session, RN stated to defer from therapy today due to pt is pt  presents with coffee ground emesis that occurred minutes ago in room . Will cont accordingly.

## 2020-01-01 NOTE — PLAN OF CARE
Dr Jonah García called-informed patient had coffee-ground emesis of approx. 200 ml. Informed Dr. Jonah García this nurse called the nocturnalist when pt. vomited and KUB was ordered stat.   Dr. Jonah García informed KUB result revealed \"prominent small bowel and colon, no obs

## 2020-01-02 ENCOUNTER — APPOINTMENT (OUTPATIENT)
Dept: GENERAL RADIOLOGY | Facility: HOSPITAL | Age: 80
DRG: 856 | End: 2020-01-02
Attending: INTERNAL MEDICINE
Payer: MEDICARE

## 2020-01-02 LAB
ANION GAP SERPL CALC-SCNC: 8 MMOL/L (ref 0–18)
BUN BLD-MCNC: 48 MG/DL (ref 7–18)
BUN/CREAT SERPL: 16.1 (ref 10–20)
CALCIUM BLD-MCNC: 10 MG/DL (ref 8.5–10.1)
CHLORIDE SERPL-SCNC: 89 MMOL/L (ref 98–112)
CO2 SERPL-SCNC: 39 MMOL/L (ref 21–32)
CREAT BLD-MCNC: 2.99 MG/DL (ref 0.7–1.3)
DEPRECATED RDW RBC AUTO: 54 FL (ref 35.1–46.3)
ERYTHROCYTE [DISTWIDTH] IN BLOOD BY AUTOMATED COUNT: 16.7 % (ref 11–15)
GLUCOSE BLD-MCNC: 115 MG/DL (ref 70–99)
HCT VFR BLD AUTO: 40.9 % (ref 39–53)
HEMOCCULT STL QL: NEGATIVE
HGB BLD-MCNC: 13.2 G/DL (ref 13–17.5)
Lab: NEGATIVE
MCH RBC QN AUTO: 29.1 PG (ref 26–34)
MCHC RBC AUTO-ENTMCNC: 32.3 G/DL (ref 31–37)
MCV RBC AUTO: 90.3 FL (ref 80–100)
OSMOLALITY SERPL CALC.SUM OF ELEC: 296 MOSM/KG (ref 275–295)
PLATELET # BLD AUTO: 521 10(3)UL (ref 150–450)
POTASSIUM SERPL-SCNC: 3.7 MMOL/L (ref 3.5–5.1)
RBC # BLD AUTO: 4.53 X10(6)UL (ref 3.8–5.8)
SODIUM SERPL-SCNC: 136 MMOL/L (ref 136–145)
WBC # BLD AUTO: 13.6 X10(3) UL (ref 4–11)

## 2020-01-02 PROCEDURE — 74018 RADEX ABDOMEN 1 VIEW: CPT | Performed by: INTERNAL MEDICINE

## 2020-01-02 PROCEDURE — 99232 SBSQ HOSP IP/OBS MODERATE 35: CPT | Performed by: INTERNAL MEDICINE

## 2020-01-02 RX ORDER — ACYCLOVIR 800 MG/1
800 TABLET ORAL
Status: COMPLETED | OUTPATIENT
Start: 2020-01-02 | End: 2020-01-02

## 2020-01-02 NOTE — PROGRESS NOTES
[facility]    Progress Note    Mojgan Simms Patient Status:  Inpatient    1940 MRN [id number]   Location [facility] 5SW/SE Attending Jasmyn Ryder MD   Hosp Day # 11 PCP Jayson Santizo MD       Subjective:   Mojgan Simms Results:     Lab Results   Component Value Date    WBC 13.6 (H) 01/02/2020    HGB 13.2 01/02/2020    HCT 40.9 01/02/2020    .0 (H) 01/02/2020    CREATSERUM 2.99 (H) 01/02/2020    BUN 48 (H) 01/02/2020     01/02/2020    K 3.7 01/02/2020 the left seminal vesicle have partially improved suggesting resolving pyelonephritis and secondary inflammation due to prostatitis, respectively. 3. Stable spondylotic changes in the lumbar spine with greatest involvement of the L3-L4 and L4-L5 levels.   Valeta Rank

## 2020-01-02 NOTE — PROGRESS NOTES
[facility]     Gastroenterology Progress Note    Christian Colindres Patient Status:  Inpatient    1940 MRN [id number]   Location [facility] 5SW/SE Attending Marilu Jones MD   Hosp Day # 11 PCP Shawna Kim MD    Recommend:  - CLD ok with LIS monitor in and out  - Control underlying infection per ID  - NG to LIS  - PPI  - correct electrolytes  - minimize narcotics and sedating meds  - move around as possible  - monitor stools and H/H     Thank you for the oppo Abdomen+pelvis(cpt=74176)    Result Date: 1/1/2020  CONCLUSION:  1. Stable findings most consistent with a generalized ileus, new from the 12/23/2019 CT.   2. Resolution of fat stranding previously noted surrounding the bladder consistent with resolved cyst

## 2020-01-02 NOTE — PLAN OF CARE
Problem: Patient Centered Care  Goal: Patient preferences are identified and integrated in the patient's plan of care  Description  Interventions:  - What would you like us to know as we care for you?  Lives with friend сергей  - Provide timely, complete, intake/output and perform bladder scan as needed  - Follow urinary retention protocol/standard of care  - Consider collaborating with pharmacy to review patient's medication profile  - Implement strategies to promote bladder emptying  Outcome: Progressing effectiveness of GI medications  - Encourage mobilization and activity  - Obtain nutritional consult as needed  - Establish a toileting routine/schedule  - Consider collaborating with pharmacy to review patient's medication profile  Outcome: Progressing adequate comfort level or patient's stated pain goal  Description  INTERVENTIONS:  - Encourage pt to monitor pain and request assistance  - Assess pain using appropriate pain scale  - Administer analgesics based on type and severity of pain and evaluate re

## 2020-01-02 NOTE — PROGRESS NOTES
Patient seen in follow up. Patient denies any chest pain or sob. Had upper GI bleed.    01/02/20  0834   BP: 121/56   Pulse: 75   Resp: 16   Temp: 97.6 °F (36.4 °C)       Intake/Output Summary (Last 24 hours) at 1/2/2020 1320  Last data filed at 1/2/2 FLUoxetine HCl (PROZAC) cap 20 mg, 20 mg, Oral, Daily  aspirin EC tab 81 mg, 81 mg, Oral, Daily  Pravastatin Sodium (PRAVACHOL) tab 20 mg, 20 mg, Oral, Nightly  Alfuzosin HCl ER (UROXATRAL) 24 hr tab 10 mg, 10 mg, Oral, Daily with breakfast  Oxybutynin Chl Metoprolol Tartrate (LOPRESSOR) 100 MG Oral Tab, Take 50 mg by mouth 2 (two) times daily. omeprazole (PRILOSEC) 20 MG Oral Capsule Delayed Release, Take 20 mg by mouth daily. Pravastatin Sodium (PRAVACHOL) 40 MG Oral Tab, Take 20 mg by mouth nightly. CONCLUSION:  1. Stable findings most consistent with a generalized ileus, new from the 12/23/2019 CT. 2. Resolution of fat stranding previously noted surrounding the bladder consistent with resolved cystitis.   Similarly, bilateral perinephric fat strandin Thank you for allowing me to participate in the care of your patient. please call if you have any question. Creat stable, continue to monitor.     Billy Zaragoza MD   General Cardiology & Advanced Heart Failure, Cardiac Transplant and Assisted Devices  [facility]

## 2020-01-02 NOTE — PLAN OF CARE
Problem: Patient Centered Care  Goal: Patient preferences are identified and integrated in the patient's plan of care  Description  Interventions:  - What would you like us to know as we care for you?  Lives with friend сергей  - Provide timely, complete, intake/output and perform bladder scan as needed  - Follow urinary retention protocol/standard of care  - Consider collaborating with pharmacy to review patient's medication profile  - Implement strategies to promote bladder emptying  Outcome: Progressing comfort level or patient's stated pain goal  Description  INTERVENTIONS:  - Encourage pt to monitor pain and request assistance  - Assess pain using appropriate pain scale  - Administer analgesics based on type and severity of pain and evaluate response  - Evaluate effectiveness of ordered antiemetic medications  - Provide nonpharmacologic comfort measures as appropriate  - Advance diet as tolerated, if ordered  - Obtain nutritional consult as needed  - Evaluate fluid balance  Outcome: Not Progressing  Goal:

## 2020-01-02 NOTE — PHYSICAL THERAPY NOTE
PHYSICAL THERAPY TREATMENT NOTE - INPATIENT     Room Number: 548/548-A       Presenting Problem: acute cystitis w/hematuria; sepsis, back pain    Problem List  Principal Problem:    Acute cystitis with hematuria  Active Problems:    SIRS (systemic inflam RESTRICTION  Weight Bearing Restriction: None                PAIN ASSESSMENT   Ratin  Location: back pain  Management Techniques: Activity promotion; Body mechanics;Breathing techniques;Relaxation;Repositioning    BALANCE 12/31/19  Patient Goal Patient's self-stated goal is: to feel better and go home   Goal #1 Patient is able to demonstrate supine - sit EOB @ level: modified independent      Goal #1   Current Status  Mod A log roll with use of side rails likes to exit bed

## 2020-01-02 NOTE — OCCUPATIONAL THERAPY NOTE
Attempted to see patient this AM for OT treatment, but patient refusing services at this time, stating he is tired and still does not feel well. Education on importance of OOB for recovery. Will re-attempt as scheduling allows.     Bekah Cabrera OTR/VETO [place]

## 2020-01-02 NOTE — PROGRESS NOTES
Responded to RRT and offered emotional support to patient's sister and her  through empathic listening. Offered words of encouragement to patient.       01/01/20 2255   Clinical Encounter Type   Visited With Patient and family together   Continue Vis

## 2020-01-02 NOTE — PROGRESS NOTES
Day RNs tried 3x to get NG tube in, no success.  This RN repeated CT results to Dr. Howard Gunn who stated that she would like for us to try again with the NG tube, and this RN explained to her that the patient at this time is refusing because it is painful and w

## 2020-01-02 NOTE — PROGRESS NOTES
Antibiotic Stewardship Note:        Subtype: Duration of Therapy ?   Date:  1/2/2020  Drug/Duration:   Zosyn  Started on 12/22/2019, now D#12  Indication:  UTI  Cultures: ngtd > 5 days  Radiology: CT 1/1: Stable findings most consistent with a generalized i

## 2020-01-02 NOTE — PROGRESS NOTES
Pt has order for NGT insertion for LIS. Attempted to insert NGT but was met with much resistance at septum. (Tried via both nares with much surgi lube). Transport on the way to take pt for STAT CT ABD. Will endorse after CT.

## 2020-01-02 NOTE — PROGRESS NOTES
[facility] ID PROGRESS NOTE    Gopal Ruiz Patient Status:  Inpatient    1940 MRN [id number]   Location [facility] 5SW/SE Attending Saturnino Del Toro MD   Hosp Day # 11 PCP Donte Asencio MD     Subjective:   Awake, had RRT for coffee ground justine right mod foraminal stenosis     History of colon polyps     History of prostate disorder     Acute pain of left shoulder     Dysfunction of left rotator cuff     Chronic constipation     Pelvic floor dysfunction     Gastroesophageal reflux disease     Acu posterior R arm, no signs of disseminated shingles               -Patient reports previous vaccine   # Acute prostatitis, urine cx with no growth               -Blood cx NGTD  # Acute sepsis 2/2  source  # CHANTALE  # Prostate cancer s/p transrectal prostate

## 2020-01-03 LAB
ANION GAP SERPL CALC-SCNC: 6 MMOL/L (ref 0–18)
BASOPHILS # BLD AUTO: 0.07 X10(3) UL (ref 0–0.2)
BASOPHILS NFR BLD AUTO: 0.5 %
BUN BLD-MCNC: 47 MG/DL (ref 7–18)
BUN/CREAT SERPL: 16.6 (ref 10–20)
CALCIUM BLD-MCNC: 9.6 MG/DL (ref 8.5–10.1)
CHLORIDE SERPL-SCNC: 92 MMOL/L (ref 98–112)
CO2 SERPL-SCNC: 39 MMOL/L (ref 21–32)
CREAT BLD-MCNC: 2.83 MG/DL (ref 0.7–1.3)
DEPRECATED RDW RBC AUTO: 53.9 FL (ref 35.1–46.3)
EOSINOPHIL # BLD AUTO: 0.11 X10(3) UL (ref 0–0.7)
EOSINOPHIL NFR BLD AUTO: 0.8 %
ERYTHROCYTE [DISTWIDTH] IN BLOOD BY AUTOMATED COUNT: 16.5 % (ref 11–15)
GLUCOSE BLD-MCNC: 106 MG/DL (ref 70–99)
HCT VFR BLD AUTO: 40.7 % (ref 39–53)
HGB BLD-MCNC: 12.7 G/DL (ref 13–17.5)
IMM GRANULOCYTES # BLD AUTO: 0.07 X10(3) UL (ref 0–1)
IMM GRANULOCYTES NFR BLD: 0.5 %
LYMPHOCYTES # BLD AUTO: 2.29 X10(3) UL (ref 1–4)
LYMPHOCYTES NFR BLD AUTO: 17.5 %
MCH RBC QN AUTO: 28.2 PG (ref 26–34)
MCHC RBC AUTO-ENTMCNC: 31.2 G/DL (ref 31–37)
MCV RBC AUTO: 90.2 FL (ref 80–100)
MONOCYTES # BLD AUTO: 0.91 X10(3) UL (ref 0.1–1)
MONOCYTES NFR BLD AUTO: 7 %
NEUTROPHILS # BLD AUTO: 9.63 X10 (3) UL (ref 1.5–7.7)
NEUTROPHILS # BLD AUTO: 9.63 X10(3) UL (ref 1.5–7.7)
NEUTROPHILS NFR BLD AUTO: 73.7 %
OSMOLALITY SERPL CALC.SUM OF ELEC: 297 MOSM/KG (ref 275–295)
PLATELET # BLD AUTO: 570 10(3)UL (ref 150–450)
POTASSIUM SERPL-SCNC: 3.8 MMOL/L (ref 3.5–5.1)
RBC # BLD AUTO: 4.51 X10(6)UL (ref 3.8–5.8)
SODIUM SERPL-SCNC: 137 MMOL/L (ref 136–145)
WBC # BLD AUTO: 13.1 X10(3) UL (ref 4–11)

## 2020-01-03 PROCEDURE — 99232 SBSQ HOSP IP/OBS MODERATE 35: CPT | Performed by: INTERNAL MEDICINE

## 2020-01-03 RX ORDER — METOPROLOL TARTRATE 50 MG/1
50 TABLET, FILM COATED ORAL 2 TIMES DAILY
Status: DISCONTINUED | OUTPATIENT
Start: 2020-01-04 | End: 2020-01-08

## 2020-01-03 NOTE — OCCUPATIONAL THERAPY NOTE
Pt not seen for OT at this time secondary to pt declining , will follow up with pt next date as pt is available.

## 2020-01-03 NOTE — PROGRESS NOTES
[facility] ID PROGRESS NOTE    Cornelius Eastern Patient Status:  Inpatient    1940 MRN [id number]   Location [facility] 5SW/SE Attending Teresa Wilson MD   Hosp Day # 15 PCP Padma Anderson MD     Subjective:  Awake, on CLD. No new complaints.  NGT History of colon polyps     History of prostate disorder     Acute pain of left shoulder     Dysfunction of left rotator cuff     Chronic constipation     Pelvic floor dysfunction     Gastroesophageal reflux disease     Acute pain of right shoulder     L4- disseminated shingles               -Patient reports previous vaccine   # Acute prostatitis, urine cx with no growth               -Blood cx NGTD  # Acute sepsis 2/2  source  # CHANTALE  # Prostate cancer s/p transrectal prostate bx 12/18 on perioperative ant

## 2020-01-03 NOTE — PLAN OF CARE
Pt had multiple loose BMs today. Inez Loera was was contacted regarding loose BMs. Ashu Brownlee will contact ID regarding this matter. Awaiting instructions.

## 2020-01-03 NOTE — PROGRESS NOTES
Patient seen in follow up. Patient denies any chest pain or sob. Had upper GI bleed.    01/03/20  1301   BP: 131/66   Pulse: 59   Resp: 22   Temp:        Intake/Output Summary (Last 24 hours) at 1/3/2020 1553  Last data filed at 1/3/2020 0655  Bakari p Pravastatin Sodium (PRAVACHOL) tab 20 mg, 20 mg, Oral, Nightly  Alfuzosin HCl ER (UROXATRAL) 24 hr tab 10 mg, 10 mg, Oral, Daily with breakfast  Oxybutynin Chloride ER (DITROPAN-XL) 24 hr tab 10 mg, 10 mg, Oral, Daily  traZODone HCl (DESYREL) tab 50 mg, 50 omeprazole (PRILOSEC) 20 MG Oral Capsule Delayed Release, Take 20 mg by mouth daily. Pravastatin Sodium (PRAVACHOL) 40 MG Oral Tab, Take 20 mg by mouth nightly. aspirin 81 MG Oral Tab, Take 81 mg by mouth daily.   Methylcellulose, Laxative, 500 MG Oral Ta WBC 13.1 01/03/2020    HGB 12.7 01/03/2020    HCT 40.7 01/03/2020    .0 01/03/2020    CREATSERUM 2.83 01/03/2020    BUN 47 01/03/2020     01/03/2020    K 3.8 01/03/2020    CL 92 01/03/2020    CO2 39.0 01/03/2020     01/03/2020    CA 9.

## 2020-01-03 NOTE — PLAN OF CARE
Per tele tech pt's HR went down briefly to 45bpm. Pt resting in bed comfortably. VSS. No signs of distress.

## 2020-01-03 NOTE — PHYSICAL THERAPY NOTE
PHYSICAL THERAPY TREATMENT NOTE - INPATIENT     Room Number: 548/548-A       Presenting Problem: acute cystitis w/hematuria; sepsis, back pain    Problem List  Principal Problem:    Acute cystitis with hematuria  Active Problems:    SIRS (systemic inflamma Little   -   Moving from lying on back to sitting on the side of the bed?: A Little   How much help from another person does the patient currently need. ..   -   Moving to and from a bed to a chair (including a wheelchair)?: A Little   -   Need to walk in h activity/exercise instructions provided to patient in preparation for discharge.

## 2020-01-03 NOTE — PLAN OF CARE
Problem: Patient Centered Care  Goal: Patient preferences are identified and integrated in the patient's plan of care  Description  Interventions:  - What would you like us to know as we care for you?  Lives with friend сергей  - Provide timely, complete, intake/output and perform bladder scan as needed  - Follow urinary retention protocol/standard of care  - Consider collaborating with pharmacy to review patient's medication profile  - Implement strategies to promote bladder emptying  Outcome: Progressing comfort level or patient's stated pain goal  Description  INTERVENTIONS:  - Encourage pt to monitor pain and request assistance  - Assess pain using appropriate pain scale  - Administer analgesics based on type and severity of pain and evaluate response  - supportive blood products/factors as ordered and appropriate  Outcome: Progressing  Goal: Free from bleeding injury  Description  (Example usage: patient with low platelets)  INTERVENTIONS:  - Avoid intramuscular injections, enemas and rectal medication ad

## 2020-01-03 NOTE — PROGRESS NOTES
[facility]    Progress Note    Sofia Parson Patient Status:  Inpatient    1940 MRN [id number]   Location [facility] 5SW/SE Attending Ese Clinton MD   Hosp Day # 12 PCP Isauro Guthrie MD       Subjective:   Sofia Parson during dictation, discrepancies may still exist.     Results:     Lab Results   Component Value Date    WBC 13.1 (H) 01/03/2020    HGB 12.7 (L) 01/03/2020    HCT 40.7 01/03/2020    .0 (H) 01/03/2020    CREATSERUM 2.83 (H) 01/03/2020    BUN 47 (H) 01 Lori Alvarez MD on 1/01/2020 at 19:28     Approved by (CST): Naomi Talavera MD on 1/01/2020 at 19:41                  Lexdarvin Maher MD  1/3/2020

## 2020-01-03 NOTE — PROGRESS NOTES
[facility]     Gastroenterology Progress Note    Leah Morris Patient Status:  Inpatient    1940 MRN [id number]   Location [facility] 5SW/SE Attending Martin Dwyer MD   Hosp Day # 12 PCP Mino Nam MD sure)  - Control underlying infection per ID  - PPI  - correct electrolytes  - minimize narcotics and sedating meds  - move around as possible  - monitor stools and H/H     Thank you for the opportunity to participate in the care of this patient.     Benjamin involvement of the L3-L4 and L4-L5 levels. The findings suspicious for early discitis/osteomyelitis noted on the recent MRI are not appreciable by CT technique. 4. Lesser incidental findings as above.      Dictated by (CST): Marissa Maya MD on 1/01/2

## 2020-01-04 ENCOUNTER — APPOINTMENT (OUTPATIENT)
Dept: GENERAL RADIOLOGY | Facility: HOSPITAL | Age: 80
DRG: 856 | End: 2020-01-04
Attending: INTERNAL MEDICINE
Payer: MEDICARE

## 2020-01-04 LAB
ALBUMIN SERPL-MCNC: 2.7 G/DL (ref 3.4–5)
ALBUMIN/GLOB SERPL: 0.6 {RATIO} (ref 1–2)
ALP LIVER SERPL-CCNC: 114 U/L (ref 45–117)
ALT SERPL-CCNC: 30 U/L (ref 16–61)
ANION GAP SERPL CALC-SCNC: 6 MMOL/L (ref 0–18)
AST SERPL-CCNC: 39 U/L (ref 15–37)
BASOPHILS # BLD AUTO: 0.08 X10(3) UL (ref 0–0.2)
BASOPHILS NFR BLD AUTO: 0.7 %
BILIRUB SERPL-MCNC: 0.5 MG/DL (ref 0.1–2)
BUN BLD-MCNC: 42 MG/DL (ref 7–18)
BUN/CREAT SERPL: 17.8 (ref 10–20)
CALCIUM BLD-MCNC: 9.8 MG/DL (ref 8.5–10.1)
CHLORIDE SERPL-SCNC: 96 MMOL/L (ref 98–112)
CO2 SERPL-SCNC: 37 MMOL/L (ref 21–32)
CREAT BLD-MCNC: 2.36 MG/DL (ref 0.7–1.3)
DEPRECATED RDW RBC AUTO: 54.2 FL (ref 35.1–46.3)
EOSINOPHIL # BLD AUTO: 0.17 X10(3) UL (ref 0–0.7)
EOSINOPHIL NFR BLD AUTO: 1.6 %
ERYTHROCYTE [DISTWIDTH] IN BLOOD BY AUTOMATED COUNT: 16.1 % (ref 11–15)
GLOBULIN PLAS-MCNC: 4.5 G/DL (ref 2.8–4.4)
GLUCOSE BLD-MCNC: 105 MG/DL (ref 70–99)
HCT VFR BLD AUTO: 40.2 % (ref 39–53)
HGB BLD-MCNC: 12.6 G/DL (ref 13–17.5)
IMM GRANULOCYTES # BLD AUTO: 0.03 X10(3) UL (ref 0–1)
IMM GRANULOCYTES NFR BLD: 0.3 %
LYMPHOCYTES # BLD AUTO: 2.42 X10(3) UL (ref 1–4)
LYMPHOCYTES NFR BLD AUTO: 22.4 %
M PROTEIN MFR SERPL ELPH: 7.2 G/DL (ref 6.4–8.2)
MCH RBC QN AUTO: 28.7 PG (ref 26–34)
MCHC RBC AUTO-ENTMCNC: 31.3 G/DL (ref 31–37)
MCV RBC AUTO: 91.6 FL (ref 80–100)
MONOCYTES # BLD AUTO: 0.73 X10(3) UL (ref 0.1–1)
MONOCYTES NFR BLD AUTO: 6.8 %
NEUTROPHILS # BLD AUTO: 7.36 X10 (3) UL (ref 1.5–7.7)
NEUTROPHILS # BLD AUTO: 7.36 X10(3) UL (ref 1.5–7.7)
NEUTROPHILS NFR BLD AUTO: 68.2 %
OSMOLALITY SERPL CALC.SUM OF ELEC: 299 MOSM/KG (ref 275–295)
PLATELET # BLD AUTO: 515 10(3)UL (ref 150–450)
POTASSIUM SERPL-SCNC: 4.1 MMOL/L (ref 3.5–5.1)
RBC # BLD AUTO: 4.39 X10(6)UL (ref 3.8–5.8)
SODIUM SERPL-SCNC: 139 MMOL/L (ref 136–145)
WBC # BLD AUTO: 10.8 X10(3) UL (ref 4–11)

## 2020-01-04 PROCEDURE — 99233 SBSQ HOSP IP/OBS HIGH 50: CPT | Performed by: INTERNAL MEDICINE

## 2020-01-04 PROCEDURE — 99232 SBSQ HOSP IP/OBS MODERATE 35: CPT | Performed by: FAMILY MEDICINE

## 2020-01-04 PROCEDURE — 74019 RADEX ABDOMEN 2 VIEWS: CPT | Performed by: INTERNAL MEDICINE

## 2020-01-04 RX ORDER — HEPARIN SODIUM 5000 [USP'U]/ML
5000 INJECTION, SOLUTION INTRAVENOUS; SUBCUTANEOUS EVERY 12 HOURS SCHEDULED
Status: DISCONTINUED | OUTPATIENT
Start: 2020-01-04 | End: 2020-01-08

## 2020-01-04 NOTE — PLAN OF CARE
Problem: Patient Centered Care  Goal: Patient preferences are identified and integrated in the patient's plan of care  Description  Interventions:  - What would you like us to know as we care for you?  Lives with friend сергей  - Provide timely, complete, assessment  - Modify environment to reduce risk of injury  - Provide assistive devices as appropriate  - Consider OT/PT consult to assist with strengthening/mobility  - Encourage toileting schedule  Outcome: Progressing     Problem: GASTROINTESTINAL - ADUL ordered  -monitor BP and labs  - See additional Care Plan goals for specific interventions   Outcome: Not Progressing     Problem: CARDIOVASCULAR - ADULT  Goal: Maintains optimal cardiac output and hemodynamic stability  Description  INTERVENTIONS:  - Rita Implement non-pharmacological measures as appropriate and evaluate response  - Consider cultural and social influences on pain and pain management  - Manage/alleviate anxiety  - Utilize distraction and/or relaxation techniques  - Monitor for opioid side ef

## 2020-01-04 NOTE — PLAN OF CARE
A/Ox4. Multiple loose BMs today. ID contacted: pt placed on contact isolation. One dose of Tylenol given. No signs of distress. NG tube clamped per orders. Plan is to pull out NG tube tomorrow. No signs of distress at this time. Call light  within reach. Angina  - Evaluate fluid balance, assess for edema, trend weights  Outcome: Progressing     Problem: GENITOURINARY - ADULT  Goal: Absence of urinary retention  Description  INTERVENTIONS:  - Assess patient’s ability to void and empty bladder  - Monitor int development  - Assess and document skin integrity  - Monitor for areas of redness and/or skin breakdown  - Initiate interventions, skin care algorithm/standards of care as needed  Outcome: Progressing     Problem: PAIN - ADULT  Goal: Verbalizes/displays ad hematologic stability  Description  INTERVENTIONS  - Assess for signs and symptoms of bleeding or hemorrhage  - Monitor labs and vital signs for trends  - Administer supportive blood products/factors, fluids and medications as ordered and appropriate  - Ad

## 2020-01-04 NOTE — PROGRESS NOTES
[facility]     Gastroenterology Progress Note    Artie Sena Patient Status:  Inpatient    1940 MRN [id number]   Location [facility] 5SW/SE Attending Kaitlyn Aceves MD   Hosp Day # 13 PCP Kelsey Tellez MD patient states that his abdomen is chronically protuberant and is less protuberant than at baseline.     Objective:     Patient Vitals for the past 24 hrs:   BP Temp Temp src Pulse Resp SpO2   01/04/20 0533 107/59 97.9 °F (36.6 °C) Oral 56 18 90 %   01/03/2 ALT 14*  --   --   --  30   BILT 0.3  --   --   --  0.5   TP 7.4  --   --   --  7.2    < > = values in this interval not displayed. Xr Abdomen (1 View) (cpt=74018)    Result Date: 1/2/2020  CONCLUSION:  1.  Generalized colonic distention mild smal

## 2020-01-04 NOTE — PLAN OF CARE
Problem: Patient Centered Care  Goal: Patient preferences are identified and integrated in the patient's plan of care  Description  Interventions:  - What would you like us to know as we care for you?  Lives with friend сергей  - Provide timely, complete, intake/output and perform bladder scan as needed  - Follow urinary retention protocol/standard of care  - Consider collaborating with pharmacy to review patient's medication profile  - Implement strategies to promote bladder emptying  Outcome: Progressing

## 2020-01-04 NOTE — PROGRESS NOTES
[facility]    Progress Note    Joanne  Patient Status:  Inpatient    1940 MRN [id number]   Location [facility] 5SW/SE Attending Licha Padilla MD   Hosp Day # 15 PCP Lexdarvin Maher MD        Subjective:     Constit Patient finished 7-day course of acyclovir. 19. Anemia, stable, NGT material neg for blood.        20.   Discharge planning:  Patient requesting possible need for skilled nursing facility at [facility].     DVT prophylaxis SCDs/compression hose, res

## 2020-01-05 LAB
ANION GAP SERPL CALC-SCNC: 3 MMOL/L (ref 0–18)
BUN BLD-MCNC: 31 MG/DL (ref 7–18)
BUN/CREAT SERPL: 15 (ref 10–20)
CALCIUM BLD-MCNC: 9.6 MG/DL (ref 8.5–10.1)
CHLORIDE SERPL-SCNC: 100 MMOL/L (ref 98–112)
CO2 SERPL-SCNC: 34 MMOL/L (ref 21–32)
CREAT BLD-MCNC: 2.07 MG/DL (ref 0.7–1.3)
DEPRECATED RDW RBC AUTO: 55 FL (ref 35.1–46.3)
ERYTHROCYTE [DISTWIDTH] IN BLOOD BY AUTOMATED COUNT: 16.2 % (ref 11–15)
GLUCOSE BLD-MCNC: 106 MG/DL (ref 70–99)
HCT VFR BLD AUTO: 37 % (ref 39–53)
HGB BLD-MCNC: 11.7 G/DL (ref 13–17.5)
MCH RBC QN AUTO: 29.2 PG (ref 26–34)
MCHC RBC AUTO-ENTMCNC: 31.6 G/DL (ref 31–37)
MCV RBC AUTO: 92.3 FL (ref 80–100)
OSMOLALITY SERPL CALC.SUM OF ELEC: 291 MOSM/KG (ref 275–295)
PLATELET # BLD AUTO: 494 10(3)UL (ref 150–450)
POTASSIUM SERPL-SCNC: 4.8 MMOL/L (ref 3.5–5.1)
RBC # BLD AUTO: 4.01 X10(6)UL (ref 3.8–5.8)
SODIUM SERPL-SCNC: 137 MMOL/L (ref 136–145)
WBC # BLD AUTO: 7.9 X10(3) UL (ref 4–11)

## 2020-01-05 PROCEDURE — 99232 SBSQ HOSP IP/OBS MODERATE 35: CPT | Performed by: FAMILY MEDICINE

## 2020-01-05 PROCEDURE — 99232 SBSQ HOSP IP/OBS MODERATE 35: CPT | Performed by: INTERNAL MEDICINE

## 2020-01-05 RX ORDER — GABAPENTIN 300 MG/1
300 CAPSULE ORAL 2 TIMES DAILY
Status: DISCONTINUED | OUTPATIENT
Start: 2020-01-05 | End: 2020-01-08

## 2020-01-05 RX ORDER — AMLODIPINE BESYLATE 5 MG/1
5 TABLET ORAL DAILY
Status: DISCONTINUED | OUTPATIENT
Start: 2020-01-05 | End: 2020-01-08

## 2020-01-05 RX ORDER — HYDROMORPHONE HYDROCHLORIDE 1 MG/ML
0.2 INJECTION, SOLUTION INTRAMUSCULAR; INTRAVENOUS; SUBCUTANEOUS EVERY 2 HOUR PRN
Status: DISCONTINUED | OUTPATIENT
Start: 2020-01-05 | End: 2020-01-08

## 2020-01-05 NOTE — PROGRESS NOTES
[facility]     Gastroenterology Progress Note    Suleman Barroso Patient Status:  Inpatient    1940 MRN [id number]   Location [facility] 5SW/SE Attending Sheron Joe MD   Hosp Day # 14 PCP Jammie Soto MD acute distress  HEENT:Negative for scleral icterus. Mucous membranes are moist.  NG in place and clamped.   CV- regular rate and rhythm   Lung- Clear to auscultation bilaterally  Abdomen: Protuberant and tympanitic but soft and perhaps a bit softer than ye less than January 2, 2020. Dictated by (CST): Vianey Rivera MD on 1/04/2020 at 18:40     Approved by (CST):  Vianey Rivera MD on 1/04/2020 at 18:48                  [facility]  1/5/2020

## 2020-01-05 NOTE — PLAN OF CARE
A&Ox4. Had complained of a sore throat; PRN Tylenol given with verbalized effectiveness. On 2L of oxygen via nasal cannula. NG tube in place; however, clamped. Tolerating clear liquid diet. Remains on IV antibiotics; afebrile. No other complaints. VSS.    P Evaluate fluid balance, assess for edema, trend weights  Outcome: Progressing     Problem: GENITOURINARY - ADULT  Goal: Absence of urinary retention  Description  INTERVENTIONS:  - Assess patient’s ability to void and empty bladder  - Monitor intake/output Assess and document skin integrity  - Monitor for areas of redness and/or skin breakdown  - Initiate interventions, skin care algorithm/standards of care as needed  Outcome: Progressing     Problem: PAIN - ADULT  Goal: Verbalizes/displays adequate comfort stability  Description  INTERVENTIONS  - Assess for signs and symptoms of bleeding or hemorrhage  - Monitor labs and vital signs for trends  - Administer supportive blood products/factors, fluids and medications as ordered and appropriate  - Administer sup

## 2020-01-05 NOTE — PROGRESS NOTES
[facility]    Progress Note    Pal Sanchez Patient Status:  Inpatient    1940 MRN [id number]   Location [facility] 5SW/SE Attending Bri Novoa MD   Hosp Day # 14 PCP Anthony Sherman MD        Subjective:     Constit with positive SLR and radiation down legs. Possible early discitis/osteomyelitis L3-L4 seen on MRI from 12/28. Will get MRI today to check for abscess formation. 6.       Hyperlipidemia. 7.       Gout.   8.       History of gastric polyp.    9.     New decompressing the stomach. 2. Moderate colonic distension from cecum to rectum but less than January 2, 2020. Dictated by (CST): Isaac Goncalves MD on 1/04/2020 at 18:40     Approved by (CST):  Isaac Goncalves MD on 1/04/2020 at 18:48

## 2020-01-05 NOTE — PROGRESS NOTES
Patient seen in follow up. Patient denies any chest pain or sob.     01/05/20  0809   BP: 117/57   Pulse: 55   Resp: 18   Temp: 97.7 °F (36.5 °C)       Intake/Output Summary (Last 24 hours) at 1/5/2020 1323  Last data filed at 1/5/2020 0830  Gross per Piperacillin Sod-Tazobactam So (ZOSYN) 4.5 g in dextrose 5 % 100 mL MBP/ADD-vantage, 4.5 g, Intravenous, Q8H  lidocaine-menthol 4-1 % 1 patch, 1 patch, Transdermal, Daily  cholecalciferol (VITAMIN D3) cap/tab 2,000 Units, 2,000 Units, Oral, Daily  FLUoxeti Cinacalcet HCl (SENSIPAR) 30 MG Oral Tab, Take 30 mg by mouth 2 (two) times daily with meals. FLUoxetine HCl (PROZAC) 20 MG Oral Cap, Take 20 mg by mouth daily. lisinopril (PRINIVIL,ZESTRIL) 40 MG Oral Tab, Take 40 mg by mouth daily.     Metoprolol Tart 12/31/19 back in NSR, I advise to restart eliquis 5 po BID if the surgeon is ok, then have him had Holter for 30 days and see if we can stop eliquis.     1/1/20 patient had some upper GI bleed, he is in NSR, hold off eliquis, continue ASA 81, Holter 30 days

## 2020-01-05 NOTE — PROGRESS NOTES
Late entry 1/4/20    Patient seen in follow up. Patient denies any chest pain or sob. Had upper GI bleed.    01/05/20  0809   BP: 117/57   Pulse: 55   Resp: 18   Temp: 97.7 °F (36.5 °C)       Intake/Output Summary (Last 24 hours) at 1/5/2020 1322  Last da Piperacillin Sod-Tazobactam So (ZOSYN) 4.5 g in dextrose 5 % 100 mL MBP/ADD-vantage, 4.5 g, Intravenous, Q8H  lidocaine-menthol 4-1 % 1 patch, 1 patch, Transdermal, Daily  cholecalciferol (VITAMIN D3) cap/tab 2,000 Units, 2,000 Units, Oral, Daily  FLUoxeti Cinacalcet HCl (SENSIPAR) 30 MG Oral Tab, Take 30 mg by mouth 2 (two) times daily with meals. FLUoxetine HCl (PROZAC) 20 MG Oral Cap, Take 20 mg by mouth daily. lisinopril (PRINIVIL,ZESTRIL) 40 MG Oral Tab, Take 40 mg by mouth daily.     Metoprolol Tart 12/31/19 back in NSR, I advise to restart eliquis 5 po BID if the surgeon is ok, then have him had Holter for 30 days and see if we can stop eliquis.     1/1/20 patient had some upper GI bleed, he is in NSR, hold off eliquis, continue ASA 81, Holter 30 days

## 2020-01-06 ENCOUNTER — APPOINTMENT (OUTPATIENT)
Dept: MRI IMAGING | Facility: HOSPITAL | Age: 80
DRG: 856 | End: 2020-01-06
Attending: FAMILY MEDICINE
Payer: MEDICARE

## 2020-01-06 LAB
ALBUMIN SERPL-MCNC: 2.5 G/DL (ref 3.4–5)
ALBUMIN/GLOB SERPL: 0.6 {RATIO} (ref 1–2)
ALP LIVER SERPL-CCNC: 96 U/L (ref 45–117)
ALT SERPL-CCNC: 27 U/L (ref 16–61)
ANION GAP SERPL CALC-SCNC: 5 MMOL/L (ref 0–18)
AST SERPL-CCNC: 33 U/L (ref 15–37)
BASOPHILS # BLD AUTO: 0.1 X10(3) UL (ref 0–0.2)
BASOPHILS NFR BLD AUTO: 1.5 %
BILIRUB SERPL-MCNC: 0.3 MG/DL (ref 0.1–2)
BUN BLD-MCNC: 25 MG/DL (ref 7–18)
BUN/CREAT SERPL: 11.7 (ref 10–20)
CALCIUM BLD-MCNC: 9.4 MG/DL (ref 8.5–10.1)
CHLORIDE SERPL-SCNC: 103 MMOL/L (ref 98–112)
CO2 SERPL-SCNC: 31 MMOL/L (ref 21–32)
CREAT BLD-MCNC: 2.14 MG/DL (ref 0.7–1.3)
DEPRECATED RDW RBC AUTO: 54.7 FL (ref 35.1–46.3)
EOSINOPHIL # BLD AUTO: 0.16 X10(3) UL (ref 0–0.7)
EOSINOPHIL NFR BLD AUTO: 2.4 %
ERYTHROCYTE [DISTWIDTH] IN BLOOD BY AUTOMATED COUNT: 16 % (ref 11–15)
GLOBULIN PLAS-MCNC: 4.1 G/DL (ref 2.8–4.4)
GLUCOSE BLD-MCNC: 108 MG/DL (ref 70–99)
HAV IGM SER QL: 2.2 MG/DL (ref 1.6–2.6)
HCT VFR BLD AUTO: 39.7 % (ref 39–53)
HGB BLD-MCNC: 12.4 G/DL (ref 13–17.5)
IMM GRANULOCYTES # BLD AUTO: 0.03 X10(3) UL (ref 0–1)
IMM GRANULOCYTES NFR BLD: 0.5 %
LYMPHOCYTES # BLD AUTO: 1.9 X10(3) UL (ref 1–4)
LYMPHOCYTES NFR BLD AUTO: 28.9 %
M PROTEIN MFR SERPL ELPH: 6.6 G/DL (ref 6.4–8.2)
MCH RBC QN AUTO: 29.1 PG (ref 26–34)
MCHC RBC AUTO-ENTMCNC: 31.2 G/DL (ref 31–37)
MCV RBC AUTO: 93.2 FL (ref 80–100)
MONOCYTES # BLD AUTO: 0.38 X10(3) UL (ref 0.1–1)
MONOCYTES NFR BLD AUTO: 5.8 %
NEUTROPHILS # BLD AUTO: 4 X10 (3) UL (ref 1.5–7.7)
NEUTROPHILS # BLD AUTO: 4 X10(3) UL (ref 1.5–7.7)
NEUTROPHILS NFR BLD AUTO: 60.9 %
OSMOLALITY SERPL CALC.SUM OF ELEC: 293 MOSM/KG (ref 275–295)
PLATELET # BLD AUTO: 435 10(3)UL (ref 150–450)
POTASSIUM SERPL-SCNC: 4.2 MMOL/L (ref 3.5–5.1)
RBC # BLD AUTO: 4.26 X10(6)UL (ref 3.8–5.8)
SODIUM SERPL-SCNC: 139 MMOL/L (ref 136–145)
WBC # BLD AUTO: 6.6 X10(3) UL (ref 4–11)

## 2020-01-06 PROCEDURE — 99232 SBSQ HOSP IP/OBS MODERATE 35: CPT | Performed by: INTERNAL MEDICINE

## 2020-01-06 NOTE — PROGRESS NOTES
[facility] ID PROGRESS NOTE    Maximo Amado Patient Status:  Inpatient    1940 MRN [id number]   Location [facility] 5SW/SE Attending Scott Painting MD   Hosp Day # 13 PCP Vannesa Shore MD     Subjective:  Awake, on FLD. No new complaints.  NGT constipation     Pelvic floor dysfunction     Gastroesophageal reflux disease     Acute pain of right shoulder     L4-5 mod central stenosis     Arthritis of right acromioclavicular joint: moderate     Impingement syndrome of right shoulder     Multiple ga NGTD  # Acute sepsis 2/2  source  # CHANTALE  # Prostate cancer s/p transrectal prostate bx 12/18 on perioperative antibotics  # BPH s/p TURP 9/26/19  # Chronic back pain  # HTN  # HLD     PLAN:  -  Continue on zosyn.  Would consider at least four weeks of IV

## 2020-01-06 NOTE — PROGRESS NOTES
Patient states that he is feeling a little better that he has due to sepsis.  provided empathic listening. Follow up with on-going spiritual care.          01/06/20 3924   Clinical Encounter Type   Visited With Patient and family together   Routin

## 2020-01-06 NOTE — PROGRESS NOTES
[facility]    Progress Note    Jennifer Perez Patient Status:  Inpatient    1940 MRN [id number]   Location [facility] 5SW/SE Attending Akilah Dorsey MD   Hosp Day # 15 PCP Aldiar Gatica MD       Subjective:   Jennifer Perez still exist.     Results:     Lab Results   Component Value Date    WBC 6.6 01/06/2020    HGB 12.4 (L) 01/06/2020    HCT 39.7 01/06/2020    .0 01/06/2020    CREATSERUM 2.14 (H) 01/06/2020    BUN 25 (H) 01/06/2020     01/06/2020    K 4.2 01/06/

## 2020-01-06 NOTE — DIETARY NOTE
ADULT NUTRITION INITIAL ASSESSMENT    Pt is at high nutrition risk. Pt meets moderate malnutrition criteria.       CRITERIA FOR MALNUTRITION DIAGNOSIS:  Criteria for non-severe malnutrition diagnosis: acute illness/injury related to energy intake less than apnea     repeat sleep study in 2018 was negative. stopped CPAP 1 year ago     ANTHROPOMETRICS:  HT: 182.9 cm (6')  WT: (!) 139.2 kg (306 lb 12.8 oz)   BMI: Body mass index is 41.6 kg/m².   BMI CLASSIFICATION: greater than 40 kg/m2 - morbid obesity class II HCl  20 mg Oral Daily   • aspirin  81 mg Oral Daily   • Pravastatin Sodium  20 mg Oral Nightly   • Alfuzosin HCl ER  10 mg Oral Daily with breakfast   • Oxybutynin Chloride ER  10 mg Oral Daily   • traZODone HCl  50 mg Oral Nightly   • allopurinol  200 mg

## 2020-01-06 NOTE — OCCUPATIONAL THERAPY NOTE
Follow-up for OT treatment. Pt went for MRI of lumbar spine (pain) --will await results of imaging before follow up. Will re-attempt on future date.

## 2020-01-06 NOTE — PROGRESS NOTES
[facility]     Gastroenterology Progress Note    Maximo Amado Patient Status:  Inpatient    1940 MRN [id number]   Location [facility] 5SW/SE Attending Scott Painting MD   Hosp Day # 15 PCP Vannesa Shore MD Liver and spleen are not palpable. Skin- No jaundice. Warm and dry. Ext: No cyanosis, clubbing or edema is evident.    Neuro- Alert and interactive, and gross movements of extremities normal  Affect-Normal         Results:     Recent Labs   Lab 01/03/20

## 2020-01-06 NOTE — PHYSICAL THERAPY NOTE
Lumbar MRI taken earlier today and awaiting for result. Pt has significant back pain that truly limits his ability to tolerate and participate in therapy beside doing exes of which he can do on his own.  Pt has pt be able to be mobilized with good pain vanessa

## 2020-01-07 ENCOUNTER — APPOINTMENT (OUTPATIENT)
Dept: GENERAL RADIOLOGY | Facility: HOSPITAL | Age: 80
DRG: 856 | End: 2020-01-07
Attending: FAMILY MEDICINE
Payer: MEDICARE

## 2020-01-07 ENCOUNTER — APPOINTMENT (OUTPATIENT)
Dept: MRI IMAGING | Facility: HOSPITAL | Age: 80
DRG: 856 | End: 2020-01-07
Attending: FAMILY MEDICINE
Payer: MEDICARE

## 2020-01-07 ENCOUNTER — APPOINTMENT (OUTPATIENT)
Dept: PICC SERVICES | Facility: HOSPITAL | Age: 80
DRG: 856 | End: 2020-01-07
Attending: FAMILY MEDICINE
Payer: MEDICARE

## 2020-01-07 PROCEDURE — 99232 SBSQ HOSP IP/OBS MODERATE 35: CPT | Performed by: INTERNAL MEDICINE

## 2020-01-07 PROCEDURE — 71045 X-RAY EXAM CHEST 1 VIEW: CPT | Performed by: FAMILY MEDICINE

## 2020-01-07 PROCEDURE — 02HV33Z INSERTION OF INFUSION DEVICE INTO SUPERIOR VENA CAVA, PERCUTANEOUS APPROACH: ICD-10-PCS | Performed by: FAMILY MEDICINE

## 2020-01-07 PROCEDURE — 72148 MRI LUMBAR SPINE W/O DYE: CPT | Performed by: FAMILY MEDICINE

## 2020-01-07 RX ORDER — SODIUM CHLORIDE 0.9 % (FLUSH) 0.9 %
10 SYRINGE (ML) INJECTION AS NEEDED
Status: DISCONTINUED | OUTPATIENT
Start: 2020-01-07 | End: 2020-01-08

## 2020-01-07 RX ORDER — LIDOCAINE HYDROCHLORIDE 10 MG/ML
0.5 INJECTION, SOLUTION INFILTRATION; PERINEURAL ONCE AS NEEDED
Status: ACTIVE | OUTPATIENT
Start: 2020-01-07 | End: 2020-01-07

## 2020-01-07 NOTE — PLAN OF CARE
Problem: Patient Centered Care  Goal: Patient preferences are identified and integrated in the patient's plan of care  Description  Interventions:  - What would you like us to know as we care for you?  Lives with friend сергей  - Provide timely, complete, bladder  - Monitor intake/output and perform bladder scan as needed  - Follow urinary retention protocol/standard of care  - Consider collaborating with pharmacy to review patient's medication profile  - Implement strategies to promote bladder emptying  Trinidad Collins Problem: PAIN - ADULT  Goal: Verbalizes/displays adequate comfort level or patient's stated pain goal  Description  INTERVENTIONS:  - Encourage pt to monitor pain and request assistance  - Assess pain using appropriate pain scale  - Administer analgesics and medications as ordered and appropriate  - Administer supportive blood products/factors as ordered and appropriate  Outcome: Progressing  Goal: Free from bleeding injury  Description  (Example usage: patient with low platelets)  INTERVENTIONS:  - Avoid

## 2020-01-07 NOTE — PROGRESS NOTES
Late entry 1/7/20    Patient seen in follow up. Patient denies any chest pain or sob.     01/07/20  1446   BP: 111/64   Pulse: 63   Resp: 18   Temp: 97.5 °F (36.4 °C)       Intake/Output Summary (Last 24 hours) at 1/7/2020 1619  Last data filed at 1/7/202 traMADol HCl (ULTRAM) tab 50 mg, 50 mg, Oral, Q12H PRN  Piperacillin Sod-Tazobactam So (ZOSYN) 4.5 g in dextrose 5 % 100 mL MBP/ADD-vantage, 4.5 g, Intravenous, Q8H  lidocaine-menthol 4-1 % 1 patch, 1 patch, Transdermal, Daily  cholecalciferol (VITAMIN D3) Cinacalcet HCl (SENSIPAR) 30 MG Oral Tab, Take 30 mg by mouth 2 (two) times daily with meals. FLUoxetine HCl (PROZAC) 20 MG Oral Cap, Take 20 mg by mouth daily. lisinopril (PRINIVIL,ZESTRIL) 40 MG Oral Tab, Take 40 mg by mouth daily.     Metoprolol Tart CONCLUSION:   1. Fluid signal within the L3-L4 intervertebral disc. Surrounding endplate edematous changes, which have increased in extent since 12/28/2019.   There are also mild erosive changes involving the inferior L3 and superior L4 endplates, slightly 1/2/20 patient in NSR, NPO due to UGI bleed continue to monitor if A fib with RVR start digoxin. 1/3/20 patient still in NSR, no A fib, no need to start any A fib meds for now, continue to monitor.     1/4/20 ginger metoprolol decreased to 50 po BID    1/

## 2020-01-07 NOTE — PROGRESS NOTES
[facility]    Progress Note    Breanna Jaime Patient Status:  Inpatient    1940 MRN [id number]   Location [facility] 5SW/SE Attending Christiano Carlos MD   Hosp Day # 16 PCP Manish Aj MD       Subjective:   Breanna Jaime discrepancies may still exist.     Results:     Lab Results   Component Value Date    WBC 6.6 01/06/2020    HGB 12.4 (L) 01/06/2020    HCT 39.7 01/06/2020    .0 01/06/2020    CREATSERUM 2.14 (H) 01/06/2020    BUN 25 (H) 01/06/2020     01/06/20

## 2020-01-07 NOTE — PROGRESS NOTES
Late entry 1/6/20    Patient seen in follow up. Patient denies any chest pain or sob.     01/07/20  1446   BP: 111/64   Pulse: 63   Resp: 18   Temp: 97.5 °F (36.4 °C)       Intake/Output Summary (Last 24 hours) at 1/7/2020 1618  Last data filed at 1/7/202 traMADol HCl (ULTRAM) tab 50 mg, 50 mg, Oral, Q12H PRN  Piperacillin Sod-Tazobactam So (ZOSYN) 4.5 g in dextrose 5 % 100 mL MBP/ADD-vantage, 4.5 g, Intravenous, Q8H  lidocaine-menthol 4-1 % 1 patch, 1 patch, Transdermal, Daily  cholecalciferol (VITAMIN D3) Cinacalcet HCl (SENSIPAR) 30 MG Oral Tab, Take 30 mg by mouth 2 (two) times daily with meals. FLUoxetine HCl (PROZAC) 20 MG Oral Cap, Take 20 mg by mouth daily. lisinopril (PRINIVIL,ZESTRIL) 40 MG Oral Tab, Take 40 mg by mouth daily.     Metoprolol Tart CONCLUSION:   1. Fluid signal within the L3-L4 intervertebral disc. Surrounding endplate edematous changes, which have increased in extent since 12/28/2019.   There are also mild erosive changes involving the inferior L3 and superior L4 endplates, slightly 1/2/20 patient in NSR, NPO due to UGI bleed continue to monitor if A fib with RVR start digoxin. 1/3/20 patient still in NSR, no A fib, no need to start any A fib meds for now, continue to monitor.     1/4/20 ginger metoprolol decreased to 50 po BID    1/

## 2020-01-07 NOTE — PROGRESS NOTES
[facility] ID PROGRESS NOTE    Cornelius Eastern Patient Status:  Inpatient    1940 MRN [id number]   Location [facility] 5SW/SE Attending Teresa Wilson MD   Hosp Day # 12 PCP Padma Anderson MD     Subjective:  Awake, on FLD.  MRI to be obtained tod disease     Acute pain of right shoulder     L4-5 mod central stenosis     Arthritis of right acromioclavicular joint: moderate     Impingement syndrome of right shoulder     Multiple gastric polyps     Primary localized osteoarthrosis of right shoulder: m s/p transrectal prostate bx 12/18 on perioperative antibotics  # BPH s/p TURP 9/26/19  # Chronic back pain  # HTN  # HLD     PLAN:  -  Continue on zosyn. Will consider at least four weeks of IV antibiotics. FU MRI. PICC to be placed.   -  Follow fever curve

## 2020-01-07 NOTE — PHYSICAL THERAPY NOTE
PHYSICAL THERAPY TREATMENT NOTE - INPATIENT     Room Number: 548/548-A       Presenting Problem: Discitis/ostemyelitis L3-L4    Problem List  Principal Problem:    Acute cystitis with hematuria  Active Problems:    Discitis of lumbar region    Left lumbar with this level of impairment may benefit from TAD placement    DISCHARGE RECOMMENDATIONS  PT Discharge Recommendations: Sub-acute rehabilitation     PLAN  PT Treatment Plan: Bed mobility; Body mechanics; Endurance; Energy conservation;Patient education; Froilan Leroy supine       Patient End of Session: In bed;Needs met;Call light within reach;RN aware of session/findings; All patient questions and concerns addressed    CURRENT GOALS   Goals to be met by:1/15/20  Patient Goal Patient's self-stated goal is: to feel rachna

## 2020-01-08 VITALS
RESPIRATION RATE: 18 BRPM | TEMPERATURE: 98 F | SYSTOLIC BLOOD PRESSURE: 134 MMHG | BODY MASS INDEX: 41.56 KG/M2 | HEIGHT: 72 IN | OXYGEN SATURATION: 95 % | WEIGHT: 306.81 LBS | DIASTOLIC BLOOD PRESSURE: 73 MMHG | HEART RATE: 65 BPM

## 2020-01-08 LAB
ANION GAP SERPL CALC-SCNC: 4 MMOL/L (ref 0–18)
BASOPHILS # BLD AUTO: 0.09 X10(3) UL (ref 0–0.2)
BASOPHILS NFR BLD AUTO: 1.3 %
BUN BLD-MCNC: 16 MG/DL (ref 7–18)
BUN/CREAT SERPL: 8.8 (ref 10–20)
CALCIUM BLD-MCNC: 9.6 MG/DL (ref 8.5–10.1)
CHLORIDE SERPL-SCNC: 106 MMOL/L (ref 98–112)
CO2 SERPL-SCNC: 29 MMOL/L (ref 21–32)
CREAT BLD-MCNC: 1.82 MG/DL (ref 0.7–1.3)
DEPRECATED RDW RBC AUTO: 55.3 FL (ref 35.1–46.3)
EOSINOPHIL # BLD AUTO: 0.34 X10(3) UL (ref 0–0.7)
EOSINOPHIL NFR BLD AUTO: 5.1 %
ERYTHROCYTE [DISTWIDTH] IN BLOOD BY AUTOMATED COUNT: 16.6 % (ref 11–15)
GLUCOSE BLD-MCNC: 119 MG/DL (ref 70–99)
HCT VFR BLD AUTO: 37 % (ref 39–53)
HGB BLD-MCNC: 11.7 G/DL (ref 13–17.5)
IMM GRANULOCYTES # BLD AUTO: 0.04 X10(3) UL (ref 0–1)
IMM GRANULOCYTES NFR BLD: 0.6 %
LYMPHOCYTES # BLD AUTO: 2.04 X10(3) UL (ref 1–4)
LYMPHOCYTES NFR BLD AUTO: 30.5 %
MCH RBC QN AUTO: 29.4 PG (ref 26–34)
MCHC RBC AUTO-ENTMCNC: 31.6 G/DL (ref 31–37)
MCV RBC AUTO: 93 FL (ref 80–100)
MONOCYTES # BLD AUTO: 0.44 X10(3) UL (ref 0.1–1)
MONOCYTES NFR BLD AUTO: 6.6 %
NEUTROPHILS # BLD AUTO: 3.73 X10 (3) UL (ref 1.5–7.7)
NEUTROPHILS # BLD AUTO: 3.73 X10(3) UL (ref 1.5–7.7)
NEUTROPHILS NFR BLD AUTO: 55.9 %
OSMOLALITY SERPL CALC.SUM OF ELEC: 290 MOSM/KG (ref 275–295)
PLATELET # BLD AUTO: 389 10(3)UL (ref 150–450)
POTASSIUM SERPL-SCNC: 4.4 MMOL/L (ref 3.5–5.1)
RBC # BLD AUTO: 3.98 X10(6)UL (ref 3.8–5.8)
SODIUM SERPL-SCNC: 139 MMOL/L (ref 136–145)
WBC # BLD AUTO: 6.7 X10(3) UL (ref 4–11)

## 2020-01-08 PROCEDURE — 99232 SBSQ HOSP IP/OBS MODERATE 35: CPT | Performed by: INTERNAL MEDICINE

## 2020-01-08 RX ORDER — TRAZODONE HYDROCHLORIDE 100 MG/1
50 TABLET ORAL NIGHTLY
Qty: 30 TABLET | Refills: 0 | Status: SHIPPED | OUTPATIENT
Start: 2020-01-08

## 2020-01-08 RX ORDER — MAGNESIUM OXIDE 400 MG (241.3 MG MAGNESIUM) TABLET
400 TABLET
Qty: 90 TABLET | Refills: 0 | Status: SHIPPED | OUTPATIENT
Start: 2020-01-08

## 2020-01-08 RX ORDER — AMLODIPINE BESYLATE 5 MG/1
5 TABLET ORAL DAILY
Qty: 30 TABLET | Refills: 0 | Status: SHIPPED | OUTPATIENT
Start: 2020-01-09

## 2020-01-08 RX ORDER — GABAPENTIN 300 MG/1
300 CAPSULE ORAL 2 TIMES DAILY
Qty: 60 CAPSULE | Refills: 0 | Status: SHIPPED | OUTPATIENT
Start: 2020-01-08

## 2020-01-08 RX ORDER — METOPROLOL TARTRATE 50 MG/1
50 TABLET, FILM COATED ORAL 2 TIMES DAILY
Qty: 60 TABLET | Refills: 0 | Status: SHIPPED | OUTPATIENT
Start: 2020-01-08

## 2020-01-08 RX ORDER — SIMETHICONE 80 MG
80 TABLET,CHEWABLE ORAL 4 TIMES DAILY PRN
Qty: 60 TABLET | Refills: 0 | Status: SHIPPED | OUTPATIENT
Start: 2020-01-08

## 2020-01-08 RX ORDER — PSEUDOEPHEDRINE HCL 30 MG
100 TABLET ORAL 2 TIMES DAILY
Qty: 60 CAPSULE | Refills: 0 | Status: SHIPPED | OUTPATIENT
Start: 2020-01-08

## 2020-01-08 RX ORDER — ALLOPURINOL 100 MG/1
200 TABLET ORAL DAILY
Qty: 60 TABLET | Refills: 0 | Status: SHIPPED | OUTPATIENT
Start: 2020-01-09

## 2020-01-08 RX ORDER — PANTOPRAZOLE SODIUM 40 MG/1
40 TABLET, DELAYED RELEASE ORAL DAILY
Qty: 30 TABLET | Refills: 0 | Status: SHIPPED | OUTPATIENT
Start: 2020-01-08

## 2020-01-08 RX ORDER — ACETAMINOPHEN 500 MG
500 TABLET ORAL EVERY 4 HOURS PRN
Qty: 60 TABLET | Refills: 0 | Status: SHIPPED | OUTPATIENT
Start: 2020-01-08

## 2020-01-08 RX ORDER — LISINOPRIL 40 MG/1
20 TABLET ORAL DAILY
Qty: 30 TABLET | Refills: 0 | Status: SHIPPED | OUTPATIENT
Start: 2020-01-08

## 2020-01-08 RX ORDER — BISACODYL 10 MG
10 SUPPOSITORY, RECTAL RECTAL
Qty: 10 SUPPOSITORY | Refills: 0 | Status: SHIPPED | OUTPATIENT
Start: 2020-01-08

## 2020-01-08 NOTE — CM/SW NOTE
MDO for agnes. Zoë Sensing at [facility] notified of above. [place] [facility] w/ xl w/c set up for 1230. Carmen Chacon RN and Jeniffer Anderson RN notified, Carmen Chacon to update pt on above and give est fees of $40. PCS on chart.      1145   Per Jenn Alcala, pt is in too much

## 2020-01-08 NOTE — PROGRESS NOTES
[facility] ID PROGRESS NOTE    Christian Colindres Patient Status:  Inpatient    1940 MRN [id number]   Location [facility] 5SW/SE Attending Marilu Jones MD   Hosp Day # 16 PCP Shawna Kim MD     Subjective:  Awake, on cardiac diet. No diarrhea. Gastroesophageal reflux disease     Acute pain of right shoulder     L4-5 mod central stenosis     Arthritis of right acromioclavicular joint: moderate     Impingement syndrome of right shoulder     Multiple gastric polyps     Primary localized osteoarthro -Blood cx NGTD  # Acute sepsis 2/2  source  # CHANTALE  # Prostate cancer s/p transrectal prostate bx 12/18 on perioperative antibotics  # BPH s/p TURP 9/26/19  # Chronic back pain  # HTN  # HLD     PLAN:  -  Continue on zosyn.  Will plan for another four we

## 2020-01-08 NOTE — PROGRESS NOTES
[facility] HOSP - [facility]    Progress Note    Gopal Ruiz Patient Status:  Inpatient    1940 MRN [id number]   Location [facility] 5SW/SE Attending Saturnino Del Toro MD   Hosp Day # 16 PCP Donte Asencio MD       Subjective:   Gopal Ruiz every 6 as needed severe pain, amlodipine 5 mg daily, Dulcolax suppository 10 mg as needed, metoprolol tartrate 50 mg twice daily, magnesium oxide 400 mg 3 times daily, Flomax 0.4 mg nightly, tolterodine extended release 4 mg daily, Eliquis 5 mg twice kirk associated drainable paraspinal or epidural fluid collection/abscess as best assessed  by noncontrast technique. Correlation with inflammatory parameters such as ESR/CRP advised.   2. L3-L4:  Mild spinal canal and minimal bilateral neural foraminal stenosi

## 2020-01-08 NOTE — PROGRESS NOTES
[facility]     Gastroenterology Progress Note    Maximo Amado Patient Status:  Inpatient    1940 MRN [id number]   Location [facility] 5SW/SE Attending Scott Painting MD   Hosp Day # 16 PCP Vannesa Shore MD normal  Affect-Normal         Results:     Recent Labs   Lab 01/03/20  0708 01/04/20  0915 01/05/20  0824 01/06/20  0738   RBC 4.51 4.39 4.01 4.26   HGB 12.7* 12.6* 11.7* 12.4*   HCT 40.7 40.2 37.0* 39.7   MCV 90.2 91.6 92.3 93.2   MCH 28.2 28.7 29.2 29.1 (cpt=71045)    Result Date: 1/7/2020  CONCLUSION:  1. Left subclavian PICC line tip ends in the SVC without pneumothorax. Worsening right basal scarring/atelectasis.     Dictated by (CST): Amy Ren MD on 1/07/2020 at 12:21     Approved by (CST): Anup Braun

## 2020-01-08 NOTE — PROGRESS NOTES
[facility]     Gastroenterology Progress Note    Narendra Keller Patient Status:  Inpatient    1940 MRN [id number]   Location [facility] 5SW/SE Attending Tara Pack MD   Hosp Day # 16 PCP Debi Sidhu MD normal  Affect-Normal            Results:     Recent Labs   Lab 01/04/20  0915 01/05/20  0824 01/06/20  0738 01/08/20  0544   RBC 4.39 4.01 4.26 3.98   HGB 12.6* 11.7* 12.4* 11.7*   HCT 40.2 37.0* 39.7 37.0*   MCV 91.6 92.3 93.2 93.0   MCH 28.7 29.2 29.1 2 (CST): Aravind Garrido MD on 1/07/2020 at 15:06          Xr Chest Ap Portable  (cpt=71045)    Result Date: 1/7/2020  CONCLUSION:  1. Left subclavian PICC line tip ends in the SVC without pneumothorax. Worsening right basal scarring/atelectasis.     Dictate

## 2020-01-08 NOTE — DISCHARGE SUMMARY
[facility]    PATIENT'S NAME: [place], [place]   ATTENDING PHYSICIAN: Norm Elam MD   PATIENT ACCOUNT#:   [id number]    LOCATION:  [address] [place] [address] RECORD #:   [id number]       YOB: 1940  ADMISSION DATE:       12/22/20 daily, fish oil, omeprazole 20 mg daily, B1, metoprolol 100 mg half a tablet b.i.d., Pravachol 40 mg half a tablet at bedtime, Cinacalcet 30 mg b.i.d., allopurinol 300 mg daily, lisinopril 40 mg daily, trazodone 100 mg at bedtime, magnesium, B12 at 2500 mc He also had acute kidney injury. He developed right arm shingles. His neck pain resolved with hot packs; however, his low back pain did not resolve. MRI was done, which showed early discitis and osteomyelitis at L3-L4.   The patient had Interventional Ra

## 2020-01-08 NOTE — PROGRESS NOTES
Late entry 1/7/20    Patient seen in follow up. Patient denies any chest pain or sob.     01/08/20  0932   BP: 134/73   Pulse: 65   Resp: 18   Temp: 97.5 °F (36.4 °C)       Intake/Output Summary (Last 24 hours) at 1/8/2020 0959  Last data filed at 1/8/202 Piperacillin Sod-Tazobactam So (ZOSYN) 4.5 g in dextrose 5 % 100 mL MBP/ADD-vantage, 4.5 g, Intravenous, Q8H  lidocaine-menthol 4-1 % 1 patch, 1 patch, Transdermal, Daily  cholecalciferol (VITAMIN D3) cap/tab 2,000 Units, 2,000 Units, Oral, Daily  FLUoxeti Cinacalcet HCl (SENSIPAR) 30 MG Oral Tab, Take 30 mg by mouth 2 (two) times daily with meals. FLUoxetine HCl (PROZAC) 20 MG Oral Cap, Take 20 mg by mouth daily. lisinopril (PRINIVIL,ZESTRIL) 40 MG Oral Tab, Take 40 mg by mouth daily.     Metoprolol Tart CONCLUSION:   1. Fluid signal within the L3-L4 intervertebral disc. Surrounding endplate edematous changes, which have increased in extent since 12/28/2019.   There are also mild erosive changes involving the inferior L3 and superior L4 endplates, slightly 12/31/19 back in NSR, I advise to restart eliquis 5 po BID if the surgeon is ok, then have him had Holter for 30 days and see if we can stop eliquis.     1/1/20 patient had some upper GI bleed, he is in NSR, hold off eliquis, continue ASA 81, Holter 30 days

## 2020-01-08 NOTE — PROGRESS NOTES
George Cabrera wants pt on [id number] mg if itis okay with GI.pt already on aspirin 81 mg mg. Notified Kellie Lowe

## 2020-04-08 ENCOUNTER — VIRTUAL PHONE E/M (OUTPATIENT)
Dept: NEUROLOGY | Facility: CLINIC | Age: 80
End: 2020-04-08
Payer: MEDICARE

## 2020-04-08 DIAGNOSIS — M51.9 LUMBAR DISC DISEASE: ICD-10-CM

## 2020-04-08 DIAGNOSIS — M48.062 SPINAL STENOSIS OF LUMBAR REGION WITH NEUROGENIC CLAUDICATION: ICD-10-CM

## 2020-04-08 DIAGNOSIS — M51.26 LUMBAR HERNIATED DISC: ICD-10-CM

## 2020-04-08 DIAGNOSIS — G89.29 CHRONIC BILATERAL LOW BACK PAIN WITH LEFT-SIDED SCIATICA: ICD-10-CM

## 2020-04-08 DIAGNOSIS — M54.42 CHRONIC BILATERAL LOW BACK PAIN WITH LEFT-SIDED SCIATICA: ICD-10-CM

## 2020-04-08 DIAGNOSIS — M48.061 LUMBAR FORAMINAL STENOSIS: Primary | ICD-10-CM

## 2020-04-08 DIAGNOSIS — M96.1 LUMBAR POST-LAMINECTOMY SYNDROME: ICD-10-CM

## 2020-04-08 PROCEDURE — 99441 PHONE E/M BY PHYS 5-10 MIN: CPT | Performed by: PHYSICAL MEDICINE & REHABILITATION

## 2020-04-08 NOTE — PROGRESS NOTES
Virtual/Telephone Check-In    Christian Colindres verbally consents to a [employer] on 04/08/20. Patient understands and accepts financial responsibility for any deductible, co-insurance and/or co-pays associated with this service.     Du right moderate paracentral HNP    4. Chronic bilateral low back pain with left-sided sciatica    5. L3-4 mod, L4-5 mod central stenosis    6.  L1-2 mild diffuse, L2-3 central annular tear & mild diffuse, L3-4 mod diffuse, L4-5 right mod-large & left mod dif

## 2020-06-30 ENCOUNTER — TELEPHONE (OUTPATIENT)
Dept: GASTROENTEROLOGY | Facility: CLINIC | Age: 80
End: 2020-06-30

## 2020-06-30 RX ORDER — ASPIRIN 81 MG/1
81 TABLET ORAL DAILY
COMMUNITY

## 2020-06-30 NOTE — TELEPHONE ENCOUNTER
The patient's chart has been reviewed. Okay to schedule pt for 6-month EGD recall r/t history of gastric polyps with Dr. Lynne Marino.      Advise MAC per previous procedure/medical history with n.p.o. after midnight.   -Eligible for NE: No r/t history FRANKIE Ruiz

## 2020-06-30 NOTE — TELEPHONE ENCOUNTER
Notes recorded by Jamie Daley MD on 9/3/2019 at 5:37 PM CDT  msg left on  for pt to call to review results. RN to place on call back for EGD for 6 months.    Repeat colonoscopy in 3 years call back   Contains abnormal data SURGICAL PATHOLOGY T adenoma. · Fragments of colonic mucosa with mild nonspecific chronic inflammation.     B. Ascending colon polyps:   · Fragments of sessile serrated adenoma.     C. Sigmoid colon polyps:   · Multiple fragments of sessile serrated adenoma.   · Fragments of t Electronically signed by Phillip Valdovinos MD on 8/31/2019 at  8:56 AM   [facility])         Specimen Collected: 08/30/19  7:53 AM Last Resulted: 08/31/19  9:02 AM

## 2020-06-30 NOTE — TELEPHONE ENCOUNTER
Author: Dora King MD Service: Gastroenterology Author Type: Physician   Filed: 8/30/2019  5:22 PM Date of Service: 8/30/2019  9:00 AM Status: Signed   : Dora King MD (Physician)   [facility] Endoscopy Report        Pre sessile and less than 5 mm in size. All polypectomy sites were inspected and found to be free of bleeding.     Internal hemorrhoids noted on retroflexed view.     The esophagus was normal.  The GE junction and diaphragmatic impression were at 40 cm.   The

## 2020-06-30 NOTE — TELEPHONE ENCOUNTER
Last Procedure, Date, MD:  8/30/2019 EGD and colonoscopy with Dr. Bella Figueroa  Last Diagnosis:  Gastric polyps  Recalled for (mth/yrs): 6 months  Sedation used previously: MAC   Last Prep Used (if known):  n/a  Quality of prep (if known): n/a  Anticoagulants: ba

## 2020-07-13 ENCOUNTER — TELEPHONE (OUTPATIENT)
Dept: GASTROENTEROLOGY | Facility: CLINIC | Age: 80
End: 2020-07-13

## 2020-07-13 NOTE — TELEPHONE ENCOUNTER
Pt states \" I am having issues with my bowels. \"  Pt states he is having a hard time \"making a bowel movement. \" Please call [phone]

## 2020-07-13 NOTE — TELEPHONE ENCOUNTER
The patient was contacted, he has been using MiraLAX, fiber and has used Dulcolax suppositories. His last bowel movement was this morning.   Before that he had a bowel movement on Sunday/Yesterday    The patient will continue on high-fiber diet, make sure

## 2020-07-13 NOTE — TELEPHONE ENCOUNTER
I spoke with this patient to schedule his procedure but he wanted to schedule an OV first since he is having other issues. I informed him to call the phone number [phone] to make and OV appt.

## 2020-07-13 NOTE — TELEPHONE ENCOUNTER
Dr. Fabricio Chandler    Patient has been unable to produce a bowel movement without a suppository or enema for weeks. He has been taking Miralax twice a day every day as well as Citrucel Fiber.   Patient has been eating a great deal of high fiber foods and has been

## 2020-08-11 ENCOUNTER — LAB ENCOUNTER (OUTPATIENT)
Dept: LAB | Facility: HOSPITAL | Age: 80
End: 2020-08-11
Attending: INTERNAL MEDICINE
Payer: MEDICARE

## 2020-08-11 ENCOUNTER — TELEPHONE (OUTPATIENT)
Dept: GASTROENTEROLOGY | Facility: CLINIC | Age: 80
End: 2020-08-11

## 2020-08-11 ENCOUNTER — OFFICE VISIT (OUTPATIENT)
Dept: GASTROENTEROLOGY | Facility: CLINIC | Age: 80
End: 2020-08-11
Payer: MEDICARE

## 2020-08-11 VITALS
BODY MASS INDEX: 33 KG/M2 | SYSTOLIC BLOOD PRESSURE: 123 MMHG | DIASTOLIC BLOOD PRESSURE: 76 MMHG | WEIGHT: 245 LBS | HEART RATE: 73 BPM

## 2020-08-11 DIAGNOSIS — R19.7 DIARRHEA, UNSPECIFIED TYPE: Primary | ICD-10-CM

## 2020-08-11 DIAGNOSIS — R19.7 DIARRHEA, UNSPECIFIED TYPE: ICD-10-CM

## 2020-08-11 PROCEDURE — 99213 OFFICE O/P EST LOW 20 MIN: CPT | Performed by: INTERNAL MEDICINE

## 2020-08-11 PROCEDURE — 87077 CULTURE AEROBIC IDENTIFY: CPT

## 2020-08-11 PROCEDURE — 87427 SHIGA-LIKE TOXIN AG IA: CPT

## 2020-08-11 PROCEDURE — 87045 FECES CULTURE AEROBIC BACT: CPT

## 2020-08-11 PROCEDURE — 87493 C DIFF AMPLIFIED PROBE: CPT

## 2020-08-11 PROCEDURE — G0463 HOSPITAL OUTPT CLINIC VISIT: HCPCS | Performed by: INTERNAL MEDICINE

## 2020-08-11 PROCEDURE — 87329 GIARDIA AG IA: CPT

## 2020-08-11 PROCEDURE — 87272 CRYPTOSPORIDIUM AG IF: CPT

## 2020-08-11 PROCEDURE — 83993 ASSAY FOR CALPROTECTIN FECAL: CPT

## 2020-08-11 PROCEDURE — 87046 STOOL CULTR AEROBIC BACT EA: CPT

## 2020-08-11 NOTE — PATIENT INSTRUCTIONS
Diarrhea  - stool testing for C dif and infection  - fecal calprotectin  - BRAT diet  - request blood work from Dr. Ioana Forrest office    Gastric polyps   - repeat EGD

## 2020-08-12 LAB
CRYPTOSP AG STL QL IA: NEGATIVE
G LAMBLIA AG STL QL IA: NEGATIVE

## 2020-08-12 NOTE — TELEPHONE ENCOUNTER
Received faxed Lab Results from Dr Ricks Core office- placed on Dr Mackenzie Austin desk for review.

## 2020-08-13 LAB — C DIFF TOX B STL QL: NEGATIVE

## 2020-08-14 ENCOUNTER — TELEPHONE (OUTPATIENT)
Dept: GASTROENTEROLOGY | Facility: CLINIC | Age: 80
End: 2020-08-14

## 2020-08-14 DIAGNOSIS — D64.9 ANEMIA, UNSPECIFIED TYPE: Primary | ICD-10-CM

## 2020-08-14 NOTE — TELEPHONE ENCOUNTER
Call transferred to RN by Anoop Canales:    Dr. Annelise Blanchard    Pt states he is highly concerned b/c he has \"pudding-like diarrhea\" BM's that are \"dark black. \"Took pepto-bismal last night and imodium this morning.  Denies SOB, chest pain, LH, DZ, pallor, and fatigue

## 2020-08-15 ENCOUNTER — LAB ENCOUNTER (OUTPATIENT)
Dept: LAB | Facility: HOSPITAL | Age: 80
End: 2020-08-15
Attending: INTERNAL MEDICINE
Payer: MEDICARE

## 2020-08-15 DIAGNOSIS — D64.9 ANEMIA, UNSPECIFIED TYPE: ICD-10-CM

## 2020-08-15 LAB
BASOPHILS # BLD AUTO: 0.04 X10(3) UL (ref 0–0.2)
BASOPHILS NFR BLD AUTO: 0.5 %
DEPRECATED RDW RBC AUTO: 53.2 FL (ref 35.1–46.3)
EOSINOPHIL # BLD AUTO: 0.25 X10(3) UL (ref 0–0.7)
EOSINOPHIL NFR BLD AUTO: 3 %
ERYTHROCYTE [DISTWIDTH] IN BLOOD BY AUTOMATED COUNT: 16 % (ref 11–15)
HCT VFR BLD AUTO: 38.3 % (ref 39–53)
HGB BLD-MCNC: 11.9 G/DL (ref 13–17.5)
IMM GRANULOCYTES # BLD AUTO: 0.03 X10(3) UL (ref 0–1)
IMM GRANULOCYTES NFR BLD: 0.4 %
LYMPHOCYTES # BLD AUTO: 2.76 X10(3) UL (ref 1–4)
LYMPHOCYTES NFR BLD AUTO: 33.1 %
MCH RBC QN AUTO: 28 PG (ref 26–34)
MCHC RBC AUTO-ENTMCNC: 31.1 G/DL (ref 31–37)
MCV RBC AUTO: 90.1 FL (ref 80–100)
MONOCYTES # BLD AUTO: 0.47 X10(3) UL (ref 0.1–1)
MONOCYTES NFR BLD AUTO: 5.6 %
NEUTROPHILS # BLD AUTO: 4.79 X10 (3) UL (ref 1.5–7.7)
NEUTROPHILS # BLD AUTO: 4.79 X10(3) UL (ref 1.5–7.7)
NEUTROPHILS NFR BLD AUTO: 57.4 %
PLATELET # BLD AUTO: 216 10(3)UL (ref 150–450)
RBC # BLD AUTO: 4.25 X10(6)UL (ref 3.8–5.8)
WBC # BLD AUTO: 8.3 X10(3) UL (ref 4–11)

## 2020-08-15 PROCEDURE — 36415 COLL VENOUS BLD VENIPUNCTURE: CPT

## 2020-08-15 PROCEDURE — 85025 COMPLETE CBC W/AUTO DIFF WBC: CPT

## 2020-08-15 NOTE — TELEPHONE ENCOUNTER
The patient has had dark stools over the last month or so. Is been associated with looser diarrheal-like bowel movements. Stool testing was reviewed, pulmonary is back and negative stool culture, C. difficile and crypto.   Patient reports he has been usin

## 2020-08-17 ENCOUNTER — TELEPHONE (OUTPATIENT)
Dept: GASTROENTEROLOGY | Facility: CLINIC | Age: 80
End: 2020-08-17

## 2020-08-17 NOTE — TELEPHONE ENCOUNTER
Schedulers:Please assist with helping patient set up esophagogastroduodenoscopy per below orders from Dr. Hi Barron.

## 2020-08-17 NOTE — TELEPHONE ENCOUNTER
----- Message from Ana Armenta MD sent at 8/17/2020  4:16 PM CDT -----  Blood count at 11.9 - this shows mild anemia but in stable range   EGD should be scheduled.    RN to let the pt know

## 2020-08-17 NOTE — TELEPHONE ENCOUNTER
I reviewed below result note with the patient and he voiced understanding. Patient is already scheduled for EGD.     Future Appointments   Date Time Provider Ulices May   9/9/2020  2:15 PM [facility], PROCEDURE [id number] None

## 2020-08-17 NOTE — TELEPHONE ENCOUNTER
Scheduled for:  EGD 11978  Provider Name:  Dr aR Chaudhry  Date:  09/09/2020  Location:  [facility]  Sedation:  MAC  Time:  2:15 PM (pt is aware to arrive at 1:15PM)    Prep:  NPO AFTER MIDNIGHT  Meds/Allergies Reconciled?:  Physician reviewed    Diagnosis with codes:

## 2020-08-18 LAB — CALPROTECTIN STL-MCNT: 39.5 ΜG/G (ref ?–50)

## 2020-08-18 NOTE — TELEPHONE ENCOUNTER
Pt's procedure has been moved to 2:30 pm on 9/9/20 with Dr. Erica Mosley at [facility]. Changed in Epic. I sent an electronic CHANGE request to Endo Scheduling and received a confirmation today.

## 2020-08-20 NOTE — TELEPHONE ENCOUNTER
Received order from Dr. Yonatna Gagnon to hold Xarelto for 2 days prior to procedure. Patient also has cardiac clearance to proceed. Left message to call back to review above orders from Dr. Yonatan Eid.

## 2020-08-21 NOTE — TELEPHONE ENCOUNTER
I called patient to review anticoagulation instructions. Patient reports that he is no longer taking any blood thinner. Also aware he is to hold lisinopril morning of procedure.

## 2020-08-25 NOTE — PROGRESS NOTES
Sonja Valdovinos is a 68year old male. HPI:     The patient is a 68year old male with a history of anemia, high blood pressure and gastric polyps who presents with diarrhea and dark stools (black? ).    He has no sick contacts and reports several looser s snare technique.  -Descending colon x2, both of these polyps removed by cold snare technique, each of these were sessile and less than 5 mm in size.   All polypectomy sites were inspected and found to be free of bleeding.     Internal hemorrhoids noted on r Performed by Bonita Burnham MD at [address] [place] [address] ENDOSCOPY   • ESOPHAGOGASTRODUODENOSCOPY (EGD) N/A 9/21/2018    Performed by Lanie Srinivasan MD at [facility] [address] BILL jade   • LUMBAR FACET INJECTION Right 8/13/2015    Perfor times daily with meals. 90 tablet 0   • apixaban 5 MG Oral Tab Take 1 tablet (5 mg total) by mouth 2 (two) times daily. 60 tablet 0   • docusate sodium 100 MG Oral Cap Take 100 mg by mouth 2 (two) times daily.  60 capsule 0   • lidocaine-menthol 4-1 % Exter stated age and is in no acute distress  HEENT- anicteric sclera, neck no lymphadnopathy, OP- clear with no masses or lesions  Chest- Clear bilaterally, no wheezing,  Heart- regular rate, no murmur or gallop  Abdomen- Soft and nontender, nondistended  Ext-

## 2020-09-02 RX ORDER — LEVOTHYROXINE SODIUM 0.1 MG/1
100 TABLET ORAL
COMMUNITY

## 2020-09-07 ENCOUNTER — APPOINTMENT (OUTPATIENT)
Dept: LAB | Facility: HOSPITAL | Age: 80
End: 2020-09-07
Attending: INTERNAL MEDICINE
Payer: MEDICARE

## 2020-09-07 DIAGNOSIS — Z01.818 PRE-OP TESTING: ICD-10-CM

## 2020-09-08 LAB — SARS-COV-2 RNA RESP QL NAA+PROBE: NOT DETECTED

## 2020-09-09 ENCOUNTER — HOSPITAL ENCOUNTER (OUTPATIENT)
Facility: HOSPITAL | Age: 80
Setting detail: HOSPITAL OUTPATIENT SURGERY
Discharge: HOME | End: 2020-09-09
Attending: INTERNAL MEDICINE | Admitting: INTERNAL MEDICINE
Payer: MEDICARE

## 2020-09-09 ENCOUNTER — ANESTHESIA EVENT (OUTPATIENT)
Dept: ENDOSCOPY | Facility: HOSPITAL | Age: 80
End: 2020-09-09
Payer: MEDICARE

## 2020-09-09 ENCOUNTER — ANESTHESIA (OUTPATIENT)
Dept: ENDOSCOPY | Facility: HOSPITAL | Age: 80
End: 2020-09-09
Payer: MEDICARE

## 2020-09-09 DIAGNOSIS — D64.9 ANEMIA, UNSPECIFIED TYPE: ICD-10-CM

## 2020-09-09 DIAGNOSIS — K31.7 MULTIPLE GASTRIC POLYPS: ICD-10-CM

## 2020-09-09 DIAGNOSIS — Z01.818 PRE-OP TESTING: Primary | ICD-10-CM

## 2020-09-09 PROCEDURE — 43251 EGD REMOVE LESION SNARE: CPT | Performed by: INTERNAL MEDICINE

## 2020-09-09 PROCEDURE — 0DB68ZX EXCISION OF STOMACH, VIA NATURAL OR ARTIFICIAL OPENING ENDOSCOPIC, DIAGNOSTIC: ICD-10-PCS | Performed by: INTERNAL MEDICINE

## 2020-09-09 RX ORDER — SODIUM CHLORIDE, SODIUM LACTATE, POTASSIUM CHLORIDE, CALCIUM CHLORIDE 600; 310; 30; 20 MG/100ML; MG/100ML; MG/100ML; MG/100ML
INJECTION, SOLUTION INTRAVENOUS CONTINUOUS
Status: DISCONTINUED | OUTPATIENT
Start: 2020-09-09 | End: 2020-09-09

## 2020-09-09 RX ORDER — OMEPRAZOLE 20 MG/1
20 CAPSULE, DELAYED RELEASE ORAL
COMMUNITY

## 2020-09-09 RX ORDER — LIDOCAINE HYDROCHLORIDE 10 MG/ML
INJECTION, SOLUTION EPIDURAL; INFILTRATION; INTRACAUDAL; PERINEURAL AS NEEDED
Status: DISCONTINUED | OUTPATIENT
Start: 2020-09-09 | End: 2020-09-09 | Stop reason: SURG

## 2020-09-09 RX ORDER — NALOXONE HYDROCHLORIDE 0.4 MG/ML
80 INJECTION, SOLUTION INTRAMUSCULAR; INTRAVENOUS; SUBCUTANEOUS AS NEEDED
Status: CANCELLED | OUTPATIENT
Start: 2020-09-09 | End: 2020-09-09

## 2020-09-09 RX ORDER — SODIUM CHLORIDE, SODIUM LACTATE, POTASSIUM CHLORIDE, CALCIUM CHLORIDE 600; 310; 30; 20 MG/100ML; MG/100ML; MG/100ML; MG/100ML
INJECTION, SOLUTION INTRAVENOUS CONTINUOUS
Status: CANCELLED | OUTPATIENT
Start: 2020-09-09

## 2020-09-09 RX ADMIN — SODIUM CHLORIDE, SODIUM LACTATE, POTASSIUM CHLORIDE, CALCIUM CHLORIDE: 600; 310; 30; 20 INJECTION, SOLUTION INTRAVENOUS at 15:08:00

## 2020-09-09 RX ADMIN — SODIUM CHLORIDE, SODIUM LACTATE, POTASSIUM CHLORIDE, CALCIUM CHLORIDE: 600; 310; 30; 20 INJECTION, SOLUTION INTRAVENOUS at 14:39:00

## 2020-09-09 RX ADMIN — LIDOCAINE HYDROCHLORIDE 25 MG: 10 INJECTION, SOLUTION EPIDURAL; INFILTRATION; INTRACAUDAL; PERINEURAL at 14:43:00

## 2020-09-09 NOTE — OPERATIVE REPORT
[facility] Endoscopy Report      Preoperative Diagnosis:  - history of gastric polyps     Postoperative Diagnosis:  - gastric polyps x 5 removed  - smaller <5 mm gastric polyps not removed  - hiatal hernia      Procedure  Esophagogastroduod Specimens were retrieved with the use of a rescue net. There were several smaller polyps which were nonerythematous appearing and each was 5 mm or less in size. These were not removed.     The duodenal bulb and post bulbar regions were normal.    Estima

## 2020-09-09 NOTE — ANESTHESIA PREPROCEDURE EVALUATION
Anesthesia PreOp Note    HPI:     Noreen Rain is a 68year old male who presents for preoperative consultation requested by: Katt Alva MD    Date of Surgery: 9/9/2020    Procedure(s):  ESOPHAGOGASTRODUODENOSCOPY (EGD)  Indication: Anemia, unspeci 08/09/2017      Dysfunction of left rotator cuff         Date Noted: 08/09/2017      History of colon polyps         Date Noted: 04/11/2017      History of prostate disorder         Date Noted: 04/11/2017      C3-4 left mild, C4-5 right mild-mod, C5-6 righ 9/21/2018    Performed by Claude Lambert MD at [facility] [address] BILL jade   • LUMBAR FACET INJECTION Right 8/13/2015    Performed by Lugenia Cowden, MD at [facility]   • LUMBAR FACET INJECTION Left 7/27/2015    Performe mouth daily. , Disp: 30 tablet, Rfl: 0, Taking  PEG 3350 Oral Powd Pack, Take 17 g by mouth 2 (two) times daily as needed. , Disp: , Rfl: , Not Taking  Cholecalciferol (VITAMIN D) 50 MCG (2000 UT) Oral Tab, Take 2,000 Units by mouth daily. , Disp: , Rfl: , 9/ (Other) Father         Natural Causes   • Other (Other) Mother         Aneurysm     Social History    Socioeconomic History      Marital status: Single      Spouse name: Not on file      Number of children: Not on file      Years of education: Not on file Helmet: Not Asked        Seat Belt: Not Asked        Self-Exams: Not Asked    Social History Narrative      The patient does not use an assistive device. .        The patient does live in a home with stairs. Available pre-op labs reviewed.   Lab Results the anesthetic management as planned.   Marce Maldonado  9/9/2020 2:32 PM

## 2020-09-09 NOTE — ANESTHESIA POSTPROCEDURE EVALUATION
Patient: Mahesh Polanco    Procedure Summary     Date:  09/09/20 Room / Location:  [address] [place] [address] ENDOSCOPY 05 / [address] [place] [address] ENDOSCOPY    Anesthesia Start:  9507 Anesthesia Stop:      Procedure:  ESOPHAGOGASTRODUODENOSCOPY (EGD) (N/A ) Diagnosis:       Anemia, unspecified typ

## 2020-09-09 NOTE — H&P
History & Physical Examination    Patient Name: Charmaine Mcdonald  MRN: [id number]  CSN: [id number]  YOB: 1940    Diagnosis: history of gastric polyps   anemia      omeprazole 20 MG Oral Capsule Delayed Release, Take 20 mg by mouth every morning 9/4/2020  omega-3 fatty acids 1000 MG Oral Cap, Take 1,000 mg by mouth daily. , Disp: , Rfl: , 9/4/2020  FLUoxetine HCl (PROZAC) 20 MG Oral Cap, Take 20 mg by mouth daily. , Disp: , Rfl: , 9/8/2020  Pravastatin Sodium (PRAVACHOL) 40 MG Oral Tab, Take 20 mg negative. stopped CPAP 1 year ago     Past Surgical History:   Procedure Laterality Date   • APPENDECTOMY  1968   • APPENDECTOMY     • BACK SURGERY  1998    L4-5 Disc Herniation, procedure unknown to pt   • COLONOSCOPY  2017   • COLONOSCOPY     • Shaun Allegra

## 2020-09-10 VITALS
WEIGHT: 235 LBS | RESPIRATION RATE: 19 BRPM | TEMPERATURE: 98 F | OXYGEN SATURATION: 98 % | BODY MASS INDEX: 31.83 KG/M2 | HEIGHT: 72 IN | SYSTOLIC BLOOD PRESSURE: 144 MMHG | DIASTOLIC BLOOD PRESSURE: 87 MMHG | HEART RATE: 61 BPM

## 2020-09-12 ENCOUNTER — HOSPITAL ENCOUNTER (EMERGENCY)
Facility: HOSPITAL | Age: 80
Discharge: HOME | End: 2020-09-12
Attending: EMERGENCY MEDICINE
Payer: MEDICARE

## 2020-09-12 ENCOUNTER — TELEPHONE (OUTPATIENT)
Dept: GASTROENTEROLOGY | Facility: CLINIC | Age: 80
End: 2020-09-12

## 2020-09-12 VITALS
HEIGHT: 72 IN | HEART RATE: 58 BPM | DIASTOLIC BLOOD PRESSURE: 98 MMHG | BODY MASS INDEX: 31.02 KG/M2 | WEIGHT: 229 LBS | OXYGEN SATURATION: 98 % | SYSTOLIC BLOOD PRESSURE: 114 MMHG | RESPIRATION RATE: 16 BRPM | TEMPERATURE: 98 F

## 2020-09-12 DIAGNOSIS — R19.5 DARK STOOLS: Primary | ICD-10-CM

## 2020-09-12 LAB
ANION GAP SERPL CALC-SCNC: 6 MMOL/L (ref 0–18)
BASOPHILS # BLD AUTO: 0.03 X10(3) UL (ref 0–0.2)
BASOPHILS NFR BLD AUTO: 0.4 %
BUN BLD-MCNC: 25 MG/DL (ref 7–18)
BUN/CREAT SERPL: 14 (ref 10–20)
CALCIUM BLD-MCNC: 8.3 MG/DL (ref 8.5–10.1)
CHLORIDE SERPL-SCNC: 106 MMOL/L (ref 98–112)
CO2 SERPL-SCNC: 27 MMOL/L (ref 21–32)
CREAT BLD-MCNC: 1.78 MG/DL (ref 0.7–1.3)
DEPRECATED RDW RBC AUTO: 49.1 FL (ref 35.1–46.3)
EOSINOPHIL # BLD AUTO: 0.15 X10(3) UL (ref 0–0.7)
EOSINOPHIL NFR BLD AUTO: 1.8 %
ERYTHROCYTE [DISTWIDTH] IN BLOOD BY AUTOMATED COUNT: 14.8 % (ref 11–15)
GLUCOSE BLD-MCNC: 135 MG/DL (ref 70–99)
HCT VFR BLD AUTO: 40.1 % (ref 39–53)
HGB BLD-MCNC: 12.5 G/DL (ref 13–17.5)
IMM GRANULOCYTES # BLD AUTO: 0.01 X10(3) UL (ref 0–1)
IMM GRANULOCYTES NFR BLD: 0.1 %
LYMPHOCYTES # BLD AUTO: 2.79 X10(3) UL (ref 1–4)
LYMPHOCYTES NFR BLD AUTO: 32.9 %
MCH RBC QN AUTO: 28.1 PG (ref 26–34)
MCHC RBC AUTO-ENTMCNC: 31.2 G/DL (ref 31–37)
MCV RBC AUTO: 90.1 FL (ref 80–100)
MONOCYTES # BLD AUTO: 0.39 X10(3) UL (ref 0.1–1)
MONOCYTES NFR BLD AUTO: 4.6 %
NEUTROPHILS # BLD AUTO: 5.1 X10 (3) UL (ref 1.5–7.7)
NEUTROPHILS # BLD AUTO: 5.1 X10(3) UL (ref 1.5–7.7)
NEUTROPHILS NFR BLD AUTO: 60.2 %
OSMOLALITY SERPL CALC.SUM OF ELEC: 294 MOSM/KG (ref 275–295)
PLATELET # BLD AUTO: 243 10(3)UL (ref 150–450)
POTASSIUM SERPL-SCNC: 4.1 MMOL/L (ref 3.5–5.1)
RBC # BLD AUTO: 4.45 X10(6)UL (ref 3.8–5.8)
SODIUM SERPL-SCNC: 139 MMOL/L (ref 136–145)
WBC # BLD AUTO: 8.5 X10(3) UL (ref 4–11)

## 2020-09-12 PROCEDURE — 99283 EMERGENCY DEPT VISIT LOW MDM: CPT

## 2020-09-12 PROCEDURE — 82272 OCCULT BLD FECES 1-3 TESTS: CPT

## 2020-09-12 PROCEDURE — 36415 COLL VENOUS BLD VENIPUNCTURE: CPT

## 2020-09-12 PROCEDURE — 80048 BASIC METABOLIC PNL TOTAL CA: CPT | Performed by: EMERGENCY MEDICINE

## 2020-09-12 PROCEDURE — 85025 COMPLETE CBC W/AUTO DIFF WBC: CPT | Performed by: EMERGENCY MEDICINE

## 2020-09-12 NOTE — ED INITIAL ASSESSMENT (HPI)
Pt reports dark tarry stools and diarrhea x 2 days. Pt recently had colon polyps removed one week ago with DR Gloria Burnham.

## 2020-09-12 NOTE — ED PROVIDER NOTES
Patient Seen in: [facility] AND [facility] Emergency Department      History   Patient presents with:  GI Bleeding    Stated Complaint: Dark Stools, Anemia. Possible bleeding from polyp removal sites.  Check CBC    HPI    Patient is a 43-year-old male has had moo MAIN OR   • UPPER GI ENDOSCOPY,EXAM                      Social History    Tobacco Use      Smoking status: Never Smoker      Smokeless tobacco: Never Used    Alcohol use:  Yes      Alcohol/week: 0.0 standard drinks      Comment: Occasional glass of wine time. Normal reflexes. No cranial nerve deficit. No motor os sensory defecits noted Coordination normal.   Skin: Skin is warm and dry. Psychiatric: Normal mood and affect.  Behavior is normal. Judgment and thought content normal.   Nursing note and vitals Discharge Medication List

## 2020-09-12 NOTE — TELEPHONE ENCOUNTER
Pt contacted, diarrhea since EGD with removal of gastric polyps /stools are black but has been taking peptobismol and imodium.       Given the fact the patient has had recent upper endoscopy with removal of gastric polyps and has had some black diarrheal ty

## 2020-09-15 NOTE — TELEPHONE ENCOUNTER
Dr. Jamee Hazel    ED advised that patient follow up with you in office as soon as possible. I added him to Friday. He reports feeling better. He reports that he is no longer having dark stools and denies any symptoms of acute anemia or pain.     Thank you

## 2020-09-18 ENCOUNTER — OFFICE VISIT (OUTPATIENT)
Dept: GASTROENTEROLOGY | Facility: CLINIC | Age: 80
End: 2020-09-18
Payer: MEDICARE

## 2020-09-18 VITALS
SYSTOLIC BLOOD PRESSURE: 136 MMHG | HEIGHT: 72 IN | BODY MASS INDEX: 32.91 KG/M2 | WEIGHT: 243 LBS | DIASTOLIC BLOOD PRESSURE: 78 MMHG | HEART RATE: 64 BPM

## 2020-09-18 DIAGNOSIS — R19.7 DIARRHEA, UNSPECIFIED TYPE: ICD-10-CM

## 2020-09-18 DIAGNOSIS — D64.9 ANEMIA, UNSPECIFIED TYPE: Primary | ICD-10-CM

## 2020-09-18 DIAGNOSIS — K31.7 GASTRIC POLYPS: ICD-10-CM

## 2020-09-18 PROCEDURE — G0463 HOSPITAL OUTPT CLINIC VISIT: HCPCS | Performed by: INTERNAL MEDICINE

## 2020-09-18 PROCEDURE — 99213 OFFICE O/P EST LOW 20 MIN: CPT | Performed by: INTERNAL MEDICINE

## 2020-09-22 NOTE — PROGRESS NOTES
Carl Way is a 68year old male. HPI:   Patient presents with: Follow - Up    The patient is a 77-year-old male who has a history of depression, anemia, high blood pressure, gastric polyps who follows up in the office today.   He has undergone upper 7/27/2015    Performed by Karen Lema MD at [facility] MAIN OR   • LUMBAR INTERLAMINAR EPIDURAL INJECTION N/A 8/20/2015    Performed by Karen Lema MD at [facility] MAIN OR   • UPPER GI ENDOSCOPY,EXAM        Family History   Problem Relation Age of Onset   • O MG Oral Cap Take 1 capsule (300 mg total) by mouth 2 (two) times daily. 60 capsule 0   • lidocaine-menthol 4-1 % External Patch Place 1 patch onto the skin nightly.  30 patch 0   • simethicone 80 MG Oral Chew Tab Chew 1 tablet (80 mg total) by mouth 4 (four Anemia    The patient appears back to baseline. Not sure what triggered his symptoms but there does not appear to be any evidence of gastrointestinal bleeding or acute GI process at this time. Repeat EGD in 1 years time.       Call or follow up if sym

## 2020-11-25 ENCOUNTER — TELEPHONE (OUTPATIENT)
Dept: GASTROENTEROLOGY | Facility: CLINIC | Age: 80
End: 2020-11-25

## 2020-11-25 NOTE — TELEPHONE ENCOUNTER
Dr. Gloria Burnham    Patient called with complaints of constipation, but denies abdominal pain. Patient only is able to produce a bowel movement with the use of a dulcolax suppository.   His last bowel movement was on Monday and it was ayala brown (no blood/den

## 2020-11-25 NOTE — TELEPHONE ENCOUNTER
Patient called in stating he is having issues with bowel movements. Only way he can relieve Is with a suppository, but he does not want to use that all the time.  Please follow up

## 2020-11-26 NOTE — TELEPHONE ENCOUNTER
Patient reports issues with progressive constipation, he reports he has been using Citrucel, Colace and MiraLAX. He did use a Dulcolax suppository on Monday and had satisfactory bowel movement with that.   Otherwise he is not felt that he is been moving hi

## 2021-01-03 NOTE — TELEPHONE ENCOUNTER
Adelina Glover said he never did PT when he got an order last November. He is scheduled to start this Thursday at [address] in [facility] and needs a new order. Their fax number is [phone]. Please call to let him know when the order has been sent. No

## 2021-01-12 NOTE — PROGRESS NOTES
Patient stated name &     Chief Complaint   Patient presents with   [facility] Follow Up     & Rehab follow up    Kim Guzman and brocary hip       Health Maintenance Due   Topic    DTaP/Tdap/Td series (1 - Tdap)    GLAUCOMA SCREENING Q2Y     Pneumococcal 65+ years (1 of 1 - PPSV23)    Shingrix Vaccine Age 50> (2 of 2)    Flu Vaccine (1)       Wt Readings from Last 3 Encounters:   20 121 lb 14.6 oz (55.3 kg)   19 115 lb 3.2 oz (52.3 kg)   19 112 lb (50.8 kg)     Temp Readings from Last 3 Encounters:   21 98 °F (36.7 °C) (Oral)   20 99.5 °F (37.5 °C)   19 98.6 °F (37 °C) (Oral)     BP Readings from Last 3 Encounters:   21 124/80   20 112/68   19 145/55     Pulse Readings from Last 3 Encounters:   21 (!) 115   20 92   19 71         Learning Assessment:  :     Learning Assessment 2018   PRIMARY LEARNER Patient   HIGHEST LEVEL OF EDUCATION - PRIMARY LEARNER  4 YEARS OF COLLEGE   PRIMARY LANGUAGE ENGLISH   LEARNER PREFERENCE PRIMARY READING   ANSWERED BY self   RELATIONSHIP SELF       Depression Screening:  :     3 most recent PHQ Screens 2019   Little interest or pleasure in doing things Not at all   Feeling down, depressed, irritable, or hopeless Not at all   Total Score PHQ 2 0       Fall Risk Assessment:  :     Fall Risk Assessment, last 12 mths 2019   Able to walk? Yes   Fall in past 12 months? Yes   Number of falls in past 12 months 1   Fall with injury? 1       Abuse Screening:  :     Abuse Screening Questionnaire 2018   Do you ever feel afraid of your partner? N   Are you in a relationship with someone who physically or mentally threatens you? N       Coordination of Care Questionnaire:  :     1) Have you been to an emergency room, urgent care clinic since your last visit? No    Hospitalized since your last visit?  Yes [facility] - Surgery on Hip   & [facility] - Rehab             2) Have you seen or consulted any other health care RRT    *See RRT Documentation Record*    Reason the RRT was called: Patient having coffee ground emesis and abdominal distension   Assessment of patient leading up to RRT: distended abdomen, bowel sounds present, nausea, belching, and emesis   Intervention providers outside of [address] [place] [address] since your last visit? Yes   See above  (Include any pap smears or colon screenings in this section.)    Patient is accompanied by daughter I have received verbal consent from Bev Sweet to discuss any/all medical information while they are present in the room.

## 2021-01-19 ENCOUNTER — TELEPHONE (OUTPATIENT)
Dept: FAMILY MEDICINE CLINIC | Facility: CLINIC | Age: 81
End: 2021-01-19

## 2021-03-04 DIAGNOSIS — Z23 NEED FOR VACCINATION: ICD-10-CM

## 2021-04-26 NOTE — PROGRESS NOTES
[facility] Follow-up Visit    Oliva related to history of gastric polyps, findings: Hyperplastic gastric polyps x5, hiatal hernia    August 2019 EGD/colonoscopy with Dr. Lynne Marino with MAC related to anemia, findings: Colon polyps x5, internal hemorrhoids, gastric polyps, 6-month daily recall, 3 Tab Take 100 mcg by mouth before breakfast.     • Aspirin Buf,CaCarb-MgCarb-MgO, 81 MG Oral Tab Take 81 mg by mouth daily. • aspirin 81 MG Oral Tab EC Take 81 mg by mouth daily.      • lisinopril 40 MG Oral Tab Take 0.5 tablets (20 mg total) by mouth da nightly. (Patient not taking: Reported on 5/10/2021 ) 30 patch 0   • simethicone 80 MG Oral Chew Tab Chew 1 tablet (80 mg total) by mouth 4 (four) times daily as needed (jaja).  (Patient not taking: Reported on 5/10/2021 ) 60 tablet 0   • PEG 3350 Oral Pow quadrants without rigidity or guarding, non-distended, no abnormal bowel sounds noted, no masses are palpated  : no CVA tenderness  Skin: dry, warm, no jaundice  Ext: no cyanosis, clubbing or edema is evident.    Neuro: Alert and oriented x4, and patient symptoms persist, consider Linzess or Trulance. All immediate questions/concerns were addressed. 30 minutes was spent in patient consultation, coordination of care, documentation    2.   Screening for colon cancer: Up-to-date on colonoscopy as above

## 2021-05-10 ENCOUNTER — OFFICE VISIT (OUTPATIENT)
Dept: GASTROENTEROLOGY | Facility: CLINIC | Age: 81
End: 2021-05-10
Payer: MEDICARE

## 2021-05-10 VITALS
HEIGHT: 72 IN | HEART RATE: 54 BPM | SYSTOLIC BLOOD PRESSURE: 156 MMHG | WEIGHT: 250 LBS | DIASTOLIC BLOOD PRESSURE: 85 MMHG | BODY MASS INDEX: 33.86 KG/M2

## 2021-05-10 DIAGNOSIS — K59.00 CONSTIPATION, UNSPECIFIED CONSTIPATION TYPE: Primary | ICD-10-CM

## 2021-05-10 PROCEDURE — 99214 OFFICE O/P EST MOD 30 MIN: CPT | Performed by: NURSE PRACTITIONER

## 2021-08-11 ENCOUNTER — OFFICE VISIT (OUTPATIENT)
Dept: NEUROLOGY | Facility: CLINIC | Age: 81
End: 2021-08-11
Payer: MEDICARE

## 2021-08-11 ENCOUNTER — TELEPHONE (OUTPATIENT)
Dept: NEUROLOGY | Facility: CLINIC | Age: 81
End: 2021-08-11

## 2021-08-11 VITALS
BODY MASS INDEX: 33.86 KG/M2 | SYSTOLIC BLOOD PRESSURE: 100 MMHG | HEIGHT: 72 IN | WEIGHT: 250 LBS | HEART RATE: 57 BPM | OXYGEN SATURATION: 97 % | DIASTOLIC BLOOD PRESSURE: 54 MMHG

## 2021-08-11 DIAGNOSIS — M48.062 SPINAL STENOSIS OF LUMBAR REGION WITH NEUROGENIC CLAUDICATION: ICD-10-CM

## 2021-08-11 DIAGNOSIS — M48.061 LUMBAR FORAMINAL STENOSIS: ICD-10-CM

## 2021-08-11 DIAGNOSIS — M96.1 LUMBAR POST-LAMINECTOMY SYNDROME: ICD-10-CM

## 2021-08-11 DIAGNOSIS — M54.16 LUMBAR RADICULOPATHY: Primary | ICD-10-CM

## 2021-08-11 DIAGNOSIS — M51.9 LUMBAR DISC DISEASE: ICD-10-CM

## 2021-08-11 DIAGNOSIS — M51.26 LUMBAR HERNIATED DISC: ICD-10-CM

## 2021-08-11 PROCEDURE — 99214 OFFICE O/P EST MOD 30 MIN: CPT | Performed by: PHYSICAL MEDICINE & REHABILITATION

## 2021-08-11 NOTE — PATIENT INSTRUCTIONS
Plan  I will perform bilateral L5 TFESI(s). He does not want to do any PT at this time since he states that this has not helped in the past.    The patient does not need any pain medications at this time.     The patient will follow up in 2 months, but

## 2021-08-11 NOTE — PROGRESS NOTES
Low Back Pain H & P    Chief Complaint: Patient presents with:  Low Back Pain: LOV 09/25/2019. Patient is here for LBP and pain in left hip when moving left leg. MRI 01/07/2020. No recent injections or PT. Pain is localized in low back. Denies T/N.  Pain in MD JOSE;  Location: [address] [place] [address] ENDOSCOPY   • EGD     • HERNIA SURGERY  1997    R side   • UPPER GI ENDOSCOPY,EXAM         Family History   Family History   Problem Relation Age of Onset   • Other (Other) Father         Natural Causes   • Other (Other) Mother Stress:       Feeling of Stress :   Social Connections:       Frequency of Communication with Friends and Family:       Frequency of Social Gatherings with Friends and Family:       Attends Buddhist Services:       Active Member of Clubs or Organization Trochanteric Bursa: Non-tender for bilateral Greater Trochanteric Bursa     Vascular lower extremity:   Dorsalis pedis pulse-RIGHT 2+   Dorsalis pedis pulse-LEFT 2+   Tibialis posterior pulse-RIGHT 2+   Tibialis posterior pulse-LEFT 2+     Neurological Low

## 2021-08-11 NOTE — TELEPHONE ENCOUNTER
Per Medicare Guidelines -no authorization is required for javan     Status: Approved-Covered Benefit    Clinical staff can proceed with scheduling Bilateral L5 TFESIs CPT 03785-90

## 2021-08-11 NOTE — TELEPHONE ENCOUNTER
Patient has been scheduled for Bilateral L5 TFESIs on 8/20/21 at the [facility] with Kelechi Waite.    -Anesthesia type: local.  -If receiving MAC or IVC sedation patient will need to get COVID tested 3 days prior (order placed by [facility].) n/a local  -Patient informed n

## 2021-08-20 ENCOUNTER — OFFICE VISIT (OUTPATIENT)
Dept: SURGERY | Facility: CLINIC | Age: 81
End: 2021-08-20

## 2021-08-20 DIAGNOSIS — M96.1 LUMBAR POST-LAMINECTOMY SYNDROME: ICD-10-CM

## 2021-08-20 DIAGNOSIS — M54.16 LUMBAR RADICULOPATHY: Primary | ICD-10-CM

## 2021-08-20 DIAGNOSIS — M51.9 LUMBAR DISC DISEASE: ICD-10-CM

## 2021-08-20 DIAGNOSIS — M48.062 SPINAL STENOSIS OF LUMBAR REGION WITH NEUROGENIC CLAUDICATION: ICD-10-CM

## 2021-08-20 PROCEDURE — 64483 NJX AA&/STRD TFRM EPI L/S 1: CPT | Performed by: PHYSICAL MEDICINE & REHABILITATION

## 2021-08-20 NOTE — PROCEDURES
[address]    BILATERAL LUMBAR TRANSFORAMINAL   NAME:  Olivia Reese    MR #:    [id number] :  1940     PHYSICIAN:  Nely Momin MD        Operative Report    DATE OF PROCEDURE: 2021   PREOPERATIVE DIAGNOSES: 1. left contrast was used to obtain a good epidurogram indicating correct needle placement. Then, aspiration was performed. No blood, fluid, or air was aspirated.   Then, the patient was injected with a 3 cc solution of 1.5 cc of 40 mg/cc of Kenalog and 1.5 cc of

## 2021-08-31 ENCOUNTER — TELEPHONE (OUTPATIENT)
Dept: NEUROLOGY | Facility: CLINIC | Age: 81
End: 2021-08-31

## 2021-09-14 ENCOUNTER — OFFICE VISIT (OUTPATIENT)
Dept: GASTROENTEROLOGY | Facility: CLINIC | Age: 81
End: 2021-09-14
Payer: MEDICARE

## 2021-09-14 VITALS
BODY MASS INDEX: 36.25 KG/M2 | HEIGHT: 72 IN | DIASTOLIC BLOOD PRESSURE: 66 MMHG | HEART RATE: 56 BPM | WEIGHT: 267.63 LBS | SYSTOLIC BLOOD PRESSURE: 113 MMHG

## 2021-09-14 DIAGNOSIS — K59.00 CONSTIPATION, UNSPECIFIED CONSTIPATION TYPE: Primary | ICD-10-CM

## 2021-09-14 DIAGNOSIS — D64.9 ANEMIA, UNSPECIFIED TYPE: ICD-10-CM

## 2021-09-14 PROCEDURE — 99213 OFFICE O/P EST LOW 20 MIN: CPT | Performed by: INTERNAL MEDICINE

## 2021-09-14 RX ORDER — OMEPRAZOLE 20 MG/1
20 TABLET, DELAYED RELEASE ORAL DAILY
COMMUNITY
Start: 2021-07-01

## 2021-09-14 NOTE — PATIENT INSTRUCTIONS
Constipation   - Restart on Miralax ( once a day )   - ok to use suppository as needed for BM    Anemia  - blood count stable   - repeat EGD in 6 months  - contact primary doctor about the B12 dosing

## 2021-09-14 NOTE — PROGRESS NOTES
Olivia Reese is a 80year old male.     HPI:   Patient presents with:  Constipation: anemia    The patient is a 47-year-old male who has a history of anemia, skin cancer, depression, reflux, high blood pressure, hyperlipidemia, gastric polyps, sleep apnea, status: Never Smoker      Smokeless tobacco: Never Used    Vaping Use      Vaping Use: Never used    Alcohol use:  Yes      Alcohol/week: 0.0 standard drinks      Comment: Occasional glass of wine    Drug use: No       Medications (Active prior to today's v Buf,CaCarb-MgCarb-MgO, 81 MG Oral Tab Take 81 mg by mouth daily. (Patient not taking: Reported on 9/14/2021)     • amLODIPine Besylate 5 MG Oral Tab Take 1 tablet (5 mg total) by mouth daily.  (Patient not taking: No sig reported) 30 tablet 0   • docusate s or lesions  Neuro- appropriate response, alert, no confusion  .   ASSESSMENT/PLAN:   Assessment   Constipation   History of colon polyps   Gastric polyps   Anemia    The patient has a history of constipation, we discussed the management this including fluid

## 2021-11-04 ENCOUNTER — TELEPHONE (OUTPATIENT)
Dept: NEUROLOGY | Facility: CLINIC | Age: 81
End: 2021-11-04

## 2021-11-04 ENCOUNTER — OFFICE VISIT (OUTPATIENT)
Dept: PHYSICAL MEDICINE AND REHAB | Facility: CLINIC | Age: 81
End: 2021-11-04
Payer: MEDICARE

## 2021-11-04 VITALS
OXYGEN SATURATION: 98 % | HEART RATE: 64 BPM | WEIGHT: 168 LBS | DIASTOLIC BLOOD PRESSURE: 76 MMHG | BODY MASS INDEX: 23 KG/M2 | SYSTOLIC BLOOD PRESSURE: 114 MMHG

## 2021-11-04 DIAGNOSIS — M51.9 LUMBAR DISC DISEASE: ICD-10-CM

## 2021-11-04 DIAGNOSIS — M96.1 LUMBAR POST-LAMINECTOMY SYNDROME: ICD-10-CM

## 2021-11-04 DIAGNOSIS — M48.062 SPINAL STENOSIS OF LUMBAR REGION WITH NEUROGENIC CLAUDICATION: ICD-10-CM

## 2021-11-04 DIAGNOSIS — M48.061 LUMBAR FORAMINAL STENOSIS: ICD-10-CM

## 2021-11-04 DIAGNOSIS — M54.16 LUMBAR RADICULOPATHY: Primary | ICD-10-CM

## 2021-11-04 DIAGNOSIS — M51.26 LUMBAR HERNIATED DISC: ICD-10-CM

## 2021-11-04 PROCEDURE — 99214 OFFICE O/P EST MOD 30 MIN: CPT | Performed by: PHYSICAL MEDICINE & REHABILITATION

## 2021-11-04 NOTE — PATIENT INSTRUCTIONS
Plan  I will perform bilateral L5 TFESI(s). He will get into the PT after having the above. The patient does not need any pain medications at this time.     The patient will follow up in 2-3 months, but the patient will call me 2 weeks after havin

## 2021-11-04 NOTE — TELEPHONE ENCOUNTER
Per Medicare guidelines authorization is not required for Bilateral L5 TFESI cpt codes 19938-46, 16241. Will inform Nursing.

## 2021-11-04 NOTE — PROGRESS NOTES
Low Back Pain H & P    Chief Complaint: Patient presents with:  Low Back Pain: 08/20/21 Bilateral L5 TFESI. LOV 08/11/21. States 90% improvement after injection. When he gets up from bed he has pain in the middle of his low back.  The pain subsides after he Age of Onset   • Other (Other) Father         Natural Causes   • Other (Other) Mother         Aneurysm       Social History   Social History    Socioeconomic History      Marital status: Single      Spouse name: Not on file      Number of children: Not on Communication with Friends and Family: Not on file      Frequency of Social Gatherings with Friends and Family: Not on file      Attends Islam Services: Not on file      Active Member of Clubs or Organizations: Not on file      Attends Club or Eduardo LEFT:   4/5   RIGHT plantar reflexes: downward response   LEFT plantar reflexes: downward response   Reflexes: 1+ in bilateral lower extremities except:  Right Achilles tendon which are absent  Left Achilles tendon which are absent     Assessment  1. left

## 2021-11-05 NOTE — TELEPHONE ENCOUNTER
Patient has been scheduled for a Bilateral L5 TFESIs  on 11/19/21 at the [facility]. Medications and allergies reviewed. Patient informed to hold aspirins, nsaids, blood thinners, multivitamins, phentermine, vitamin E and fish oils 3-7 days prior to procedure.

## 2021-11-19 ENCOUNTER — OFFICE VISIT (OUTPATIENT)
Dept: SURGERY | Facility: CLINIC | Age: 81
End: 2021-11-19

## 2021-11-19 DIAGNOSIS — M51.9 LUMBAR DISC DISEASE: ICD-10-CM

## 2021-11-19 DIAGNOSIS — M96.1 LUMBAR POST-LAMINECTOMY SYNDROME: ICD-10-CM

## 2021-11-19 DIAGNOSIS — M54.16 LUMBAR RADICULOPATHY: Primary | ICD-10-CM

## 2021-11-19 DIAGNOSIS — M48.062 SPINAL STENOSIS OF LUMBAR REGION WITH NEUROGENIC CLAUDICATION: ICD-10-CM

## 2021-11-19 PROCEDURE — 64483 NJX AA&/STRD TFRM EPI L/S 1: CPT | Performed by: PHYSICAL MEDICINE & REHABILITATION

## 2021-11-19 NOTE — PROCEDURES
[address] [address]    BILATERAL LUMBAR TRANSFORAMINAL   NAME:  Pal Sanchez    MR #:    [id number] :  1940     PHYSICIAN:  Edward Looney MD        Operative Report    DATE OF PROCEDURE: 2021   PREOPERATIVE DIAGNOSES: 1. lef [id number] contrast was used to obtain a good epidurogram indicating correct needle placement. Then, aspiration was performed. No blood, fluid, or air was aspirated.   Then, the patient was injected with a 3 cc solution of 1.5 cc of 40 mg/cc of Kenalog

## 2022-03-24 ENCOUNTER — TELEPHONE (OUTPATIENT)
Dept: GASTROENTEROLOGY | Facility: CLINIC | Age: 82
End: 2022-03-24

## 2022-03-24 NOTE — TELEPHONE ENCOUNTER
Dr. Gama Boyce    Summary of care received from Dr. Divine Pruitt office. I placed this on your desk for review.     Thank you

## 2022-05-09 ENCOUNTER — TELEPHONE (OUTPATIENT)
Dept: GASTROENTEROLOGY | Facility: CLINIC | Age: 82
End: 2022-05-09

## 2022-05-09 NOTE — TELEPHONE ENCOUNTER
Dr. Barby Horton-    Is it okay to use the Franki Hunter MD approval appointment for this patient on 5-12-22 at 1 PM?    Thank you

## 2022-05-09 NOTE — TELEPHONE ENCOUNTER
Your Appointments    Thursday May 12, 2022  1:00 PM  Follow Up Visit with Mendel Frisk, [address], [address] [facility] [address] ([address] [facility] [address]) [address] [phone]     Apt initially scheduled for 6/14 cancelled.

## 2022-05-09 NOTE — TELEPHONE ENCOUNTER
Pt states he is having really bad rectal pain,  burning and itching and can not wait to see Dr until his June appt.  Please call

## 2022-05-09 NOTE — TELEPHONE ENCOUNTER
I called and spoke with the patient. He is agreeable and appreciative of the offered appointment. Requested opening of the appointment via  staff.     Will follow up to confirm entered into apt desk: May 12th w/ Mihaela @ 1 PM.

## 2022-05-12 ENCOUNTER — TELEPHONE (OUTPATIENT)
Dept: GASTROENTEROLOGY | Facility: CLINIC | Age: 82
End: 2022-05-12

## 2022-05-12 ENCOUNTER — OFFICE VISIT (OUTPATIENT)
Dept: GASTROENTEROLOGY | Facility: CLINIC | Age: 82
End: 2022-05-12
Payer: MEDICARE

## 2022-05-12 VITALS
SYSTOLIC BLOOD PRESSURE: 118 MMHG | HEART RATE: 68 BPM | DIASTOLIC BLOOD PRESSURE: 73 MMHG | BODY MASS INDEX: 38.28 KG/M2 | HEIGHT: 72 IN | WEIGHT: 282.63 LBS

## 2022-05-12 DIAGNOSIS — K59.00 CONSTIPATION, UNSPECIFIED CONSTIPATION TYPE: ICD-10-CM

## 2022-05-12 DIAGNOSIS — D64.9 ANEMIA, UNSPECIFIED TYPE: Primary | ICD-10-CM

## 2022-05-12 DIAGNOSIS — Z86.010 PERSONAL HISTORY OF COLONIC POLYPS: ICD-10-CM

## 2022-05-12 DIAGNOSIS — K31.7 GASTRIC POLYPS: ICD-10-CM

## 2022-05-12 RX ORDER — SENNA PLUS 8.6 MG/1
2 TABLET ORAL DAILY
COMMUNITY

## 2022-05-12 RX ORDER — POLYETHYLENE GLYCOL 3350, SODIUM CHLORIDE, SODIUM BICARBONATE, POTASSIUM CHLORIDE 420; 11.2; 5.72; 1.48 G/4L; G/4L; G/4L; G/4L
POWDER, FOR SOLUTION ORAL
Qty: 4000 ML | Refills: 0 | Status: SHIPPED | OUTPATIENT
Start: 2022-05-12

## 2022-05-12 NOTE — PATIENT INSTRUCTIONS
-Schedule colonoscopy and EGD w/ possible biopsy w/Dr. Jessica Ireland w/ MAC  Dx: hx colon polyps, gastric polyps, anemia  -Eligible for NE: No r/t hx FRANKIE  -Prep: Split dose Colyte/TriLyte or equivalent  -Anti-platelets and anti-coagulants: ASA - continue as prescribed  -Diabetes meds: none    ** If MAC:    - HOLD lisinopril the night before and/or the day of the procedure(s)    - NO alcohol, recreational drugs nor erectile dysfunction medications 24 hours before procedure(s)   - NO herbal supplements or weight loss medications (phentermine/Vyvanse/Adderall) x 7 days prior to the procedure(s)    ** If MAC @ [facility] or IV twilight - continue all medications as prescribed    ** COVID-19 testing required 72 hours prior to procedure    **Patient to follow-up with any new medical history/medications prior to procedure**

## 2022-05-12 NOTE — TELEPHONE ENCOUNTER
Scheduled for:  Colonoscopy [id number]; [phone]  Provider Name:  Dr Ninfa Tripp  Date:  10/28/2022  Location:  [facility]  Sedation:  MAC  Time:  7281 (pt is aware to arrive at 1130AM)  Prep:  Colyte  Meds/Allergies Reconciled?:  Physician reviewed  Diagnosis with codes:  Constipation K59.00; Gastric Polyps K31.7; Hx of colon polyps Z86.010  Was patient informed to call insurance with codes (Y/N):  Y     Referral sent?:  NA  [address] [place] [address] or [facility] notified?:  I sent an electronic request to Endo Scheduling and received a confirmation today. Medication Orders:  Pt is aware to NOT take iron pills, herbal meds and diet supplements for 7 days before exam. Also to NOT take any form of alcohol, recreational drugs and any forms of ED meds 24 hours before exam. Pt is not on lisinopril    Misc Orders:       Further instructions given by staff:  I discussed the prep intructions with the patient in office which he verbally understood. Copy of instructions was handed to patient as well. Patient was also advised he will receive a call from PAT nurse 72-24 hours prior procedure to schedule Covid test done.

## 2022-07-20 ENCOUNTER — TELEPHONE (OUTPATIENT)
Dept: NEUROLOGY | Facility: CLINIC | Age: 82
End: 2022-07-20

## 2022-07-20 ENCOUNTER — OFFICE VISIT (OUTPATIENT)
Dept: PHYSICAL MEDICINE AND REHAB | Facility: CLINIC | Age: 82
End: 2022-07-20
Payer: MEDICARE

## 2022-07-20 VITALS — HEIGHT: 72 IN | BODY MASS INDEX: 35.49 KG/M2 | WEIGHT: 262 LBS

## 2022-07-20 DIAGNOSIS — M48.062 SPINAL STENOSIS OF LUMBAR REGION WITH NEUROGENIC CLAUDICATION: ICD-10-CM

## 2022-07-20 DIAGNOSIS — M51.9 LUMBAR DISC DISEASE: ICD-10-CM

## 2022-07-20 DIAGNOSIS — M48.061 LUMBAR FORAMINAL STENOSIS: ICD-10-CM

## 2022-07-20 DIAGNOSIS — R26.9 NEUROLOGIC GAIT DYSFUNCTION: ICD-10-CM

## 2022-07-20 DIAGNOSIS — M96.1 LUMBAR POST-LAMINECTOMY SYNDROME: ICD-10-CM

## 2022-07-20 DIAGNOSIS — M54.16 LUMBAR RADICULOPATHY: Primary | ICD-10-CM

## 2022-07-20 DIAGNOSIS — M51.26 LUMBAR HERNIATED DISC: ICD-10-CM

## 2022-07-20 PROCEDURE — 99214 OFFICE O/P EST MOD 30 MIN: CPT | Performed by: PHYSICAL MEDICINE & REHABILITATION

## 2022-07-20 NOTE — TELEPHONE ENCOUNTER
Left L4 (L4-5) TFESI CPT CODE 16823,55739  Status: Approved- No pre-certification required. Per Medicare Guidelines; request is a covered benefit and pre-certification is not require. All Medicare coverage is based on Medical Necessity.    Notified nursing for scheduling

## 2022-07-20 NOTE — PATIENT INSTRUCTIONS
Plan  I will perform left L4 TFESI(s). If the above injection does not help him, then I will have him get a new MRI of the lumbar spine. He will continue with the Tylenol for the pain and will try to not take the Aleve. He will resume the Ultram as needed for the pain. The patient will follow up in 2 months, but the patient will call me 2 weeks after having the injection to let me know how the injection worked.

## 2022-07-21 RX ORDER — PREDNISONE 10 MG/1
10 TABLET ORAL DAILY
Qty: 5 TABLET | Refills: 0 | Status: SHIPPED | OUTPATIENT
Start: 2022-07-21

## 2022-07-21 RX ORDER — PREDNISONE 50 MG/1
TABLET ORAL
Qty: 3 TABLET | Refills: 0 | Status: CANCELLED | OUTPATIENT
Start: 2022-07-21

## 2022-07-21 NOTE — TELEPHONE ENCOUNTER
(Allergy prep ordered)  Patient has been scheduled for Left L4 (L4-5) TFESI on 7/26/22 at the [facility] with . -Anesthesia type: LOCAL. -If receiving MAC or IVC sedation patient will need to get COVID tested 3 days prior even if already vaccinated (order placed by [facility].)  -If scheduling [address] covid testing required for all procedures whether patient is vaccinated or not. -Patient informed not to eat or drink anything after midnight the night prior to the procedure, if being sedated. -Patient was advised that if he/she does receive the covid vaccine it needs to be at least 2 weeks before or after the injection. -Medications and allergies reviewed. -Patient reminded to hold NSAIDs (Ibuprofen, ASA 81, Aleve, Naproxen, Mobic etc.) for 3 days prior to [place]  if BMI is greater than 35. For Cervical injections only hold multivitamins, Vitamin E, Fish Oil, Phentermine (Lomaira) for 7 days prior to injection and NSAIDS.  mg to be held for 7 days prior to injections.  -If patient is receiving MAC/IVCS Phentermine Reginaldo [place]) will need to be held for 7 days prior to injection.  -If on blood thinner clearance has been received to hold this medication by provider.   -Patient informed he/she will need a  to and from procedure. -[facility] is located in the [facility] 1st floor. Patient may park in the yellow parking. Patient verbalized understanding and agrees with plan.  -----> Scheduled in Epic: Yes  -----> Scheduled in [employer]:  Yes

## 2022-07-26 ENCOUNTER — OFFICE VISIT (OUTPATIENT)
Dept: SURGERY | Facility: CLINIC | Age: 82
End: 2022-07-26

## 2022-07-26 DIAGNOSIS — M96.1 LUMBAR POST-LAMINECTOMY SYNDROME: ICD-10-CM

## 2022-07-26 DIAGNOSIS — M51.9 LUMBAR DISC DISEASE: ICD-10-CM

## 2022-07-26 DIAGNOSIS — M48.061 LUMBAR FORAMINAL STENOSIS: ICD-10-CM

## 2022-07-26 DIAGNOSIS — M54.16 LUMBAR RADICULOPATHY: Primary | ICD-10-CM

## 2022-07-26 PROCEDURE — 64483 NJX AA&/STRD TFRM EPI L/S 1: CPT | Performed by: PHYSICAL MEDICINE & REHABILITATION

## 2022-07-26 NOTE — PROCEDURES
[address] [address]    LUMBAR TRANSFORAMINAL   NAME:  Esthela Bowling    MR #:    [id number] :  1940     PHYSICIAN:  Delano Espana MD        Operative Report    DATE OF PROCEDURE: 2022   PREOPERATIVE DIAGNOSES: 1. left L4 radiculopathy    2. Lumbar post-laminectomy syndrome: right L3-4    3. L5-S1 left mod, L4-5 bilateral mod, L3-4 left mild foraminal stenosis    4. L1-2 mild diffuse, L2-3 central annular tear & mild diffuse, L3-4 mod diffuse, L4-5 right mod-large & left mod diffuse, L5-S1 left mod far lateral bulging discs        POSTOPERATIVE DIAGNOSES:   1. left L4 radiculopathy    2. Lumbar post-laminectomy syndrome: right L3-4    3. L5-S1 left mod, L4-5 bilateral mod, L3-4 left mild foraminal stenosis    4. L1-2 mild diffuse, L2-3 central annular tear & mild diffuse, L3-4 mod diffuse, L4-5 right mod-large & left mod diffuse, L5-S1 left mod far lateral bulging discs        PROCEDURES: left L4 transforaminal epidural steroid injection done under fluoroscopic guidance with contrast enhancement. SURGEON: Delano Manzo MD   ANESTHESIA: Local   INDICATIONS:      OPERATIVE PROCEDURE:  Written consent was obtained from the patient. The patient was brought into the operating room and placed in the prone position on the fluoroscopy table with pillow underneath his abdomen. The patient's skin was cleaned and draped in a normal sterile fashion. Using AP fluoroscopy, all five lumbar vertebrae were identified. When the fourth vertebra was identified, fluoroscopy was right anterior obliqued opening up the left L4-5 intervertebral foramen. At this point in time, the patient's skin was anesthetized with 1% PF lidocaine without epinephrine. Then, a 5 inch, 22-gauge spinal needle was inserted and directed towards the left L4-5 intervertebral foramen. When it felt to be in good position, AP fluoroscopy was used to advance the needle to the 6 o'clock position on the left L4 pedicle.   At this point in time, Omnipaque-240 contrast was used to obtain a good epidurogram indicating correct needle placement. Then, aspiration was performed. No blood, fluid, or air was aspirated. Then, the patient was injected with a 4 cc solution of 2 cc of 40 mg/cc of Kenalog and 2 cc of 1% PF lidocaine without epinephrine. After this, the needle was removed. The patient's skin was cleaned. A Band-Aid was applied. The patient was transferred to the cart and into [facility]. The patient was given discharge instructions and will follow up in the clinic as scheduled. Throughout the whole procedure, the patient's pulse oximetry and vital signs were monitored and they remained completely stable. Also, throughout the whole procedure, prior to injection of any medication, aspiration was performed. No blood, fluid, or air was aspirated at anytime.

## 2022-08-16 ENCOUNTER — TELEPHONE (OUTPATIENT)
Dept: PHYSICAL MEDICINE AND REHAB | Facility: CLINIC | Age: 82
End: 2022-08-16

## 2022-08-16 NOTE — TELEPHONE ENCOUNTER
Pt is calling to give a condition update, states his left side is still hurting.  Pt would like a call back,please advise-NL

## 2022-08-17 NOTE — TELEPHONE ENCOUNTER
Condition update after Procedure. - Patient had left L4 transforaminal epidural steroid injection on 7/26/22 with . - 100% relief for back pain. - 20 % relief for left thigh pain    - LOV: 7/20/2022 Justin Manzo MD    - NOV: 10/20/2022 Jarvis Meckel, MD   - Plan from LOV: Per Jan Hernandez: \"If the above injection does not help him, then I will have him get a new MRI of the lumbar spine. \"    Patient stated back pain is completely gone, but left thigh pain is still 6-7/10. Pain was 9/10 prior to the injection. Informed patient update will be relayed to Jan Hernandez for next steps. Patient understood and was appreciative. Update relayed to Jan Hernandez. Awaiting recommendations.

## 2022-08-18 NOTE — TELEPHONE ENCOUNTER
Patient is calling in stating 8/29 at 12 does not work for him.  States he need something sooner or soon after 8/29

## 2022-08-18 NOTE — TELEPHONE ENCOUNTER
Spoke with patient and r/s appt to 9/1/22. Patient was appreciative. Nothing further needed at this time.

## 2022-09-01 ENCOUNTER — OFFICE VISIT (OUTPATIENT)
Dept: PHYSICAL MEDICINE AND REHAB | Facility: CLINIC | Age: 82
End: 2022-09-01
Payer: MEDICARE

## 2022-09-01 ENCOUNTER — TELEPHONE (OUTPATIENT)
Dept: NEUROLOGY | Facility: CLINIC | Age: 82
End: 2022-09-01

## 2022-09-01 VITALS
BODY MASS INDEX: 35.49 KG/M2 | DIASTOLIC BLOOD PRESSURE: 70 MMHG | OXYGEN SATURATION: 96 % | WEIGHT: 262 LBS | HEART RATE: 65 BPM | HEIGHT: 72 IN | SYSTOLIC BLOOD PRESSURE: 110 MMHG

## 2022-09-01 DIAGNOSIS — M48.061 LUMBAR FORAMINAL STENOSIS: ICD-10-CM

## 2022-09-01 DIAGNOSIS — M54.16 LUMBAR RADICULOPATHY: Primary | ICD-10-CM

## 2022-09-01 DIAGNOSIS — M48.062 SPINAL STENOSIS OF LUMBAR REGION WITH NEUROGENIC CLAUDICATION: ICD-10-CM

## 2022-09-01 DIAGNOSIS — Z91.041 ALLERGY TO INTRAVENOUS CONTRAST MEDIA: Primary | ICD-10-CM

## 2022-09-01 DIAGNOSIS — M51.9 LUMBAR DISC DISEASE: ICD-10-CM

## 2022-09-01 DIAGNOSIS — M51.26 LUMBAR HERNIATED DISC: ICD-10-CM

## 2022-09-01 DIAGNOSIS — M96.1 LUMBAR POST-LAMINECTOMY SYNDROME: ICD-10-CM

## 2022-09-01 PROCEDURE — 99214 OFFICE O/P EST MOD 30 MIN: CPT | Performed by: PHYSICAL MEDICINE & REHABILITATION

## 2022-09-01 RX ORDER — POLYETHYLENE GLYCOL 3350, SODIUM SULFATE ANHYDROUS, SODIUM BICARBONATE, SODIUM CHLORIDE, POTASSIUM CHLORIDE 236; 22.74; 6.74; 5.86; 2.97 G/4L; G/4L; G/4L; G/4L; G/4L
240 POWDER, FOR SOLUTION ORAL AS DIRECTED
COMMUNITY
Start: 2022-06-04

## 2022-09-01 NOTE — TELEPHONE ENCOUNTER
Per Medicare guidelines authorization is not required for Left L4 + Left L5 TFESI             cpt codes 01876-NL, 80826-LV, 34742. Will inform Nursing.

## 2022-09-01 NOTE — PATIENT INSTRUCTIONS
Plan  I will perform left L4 and L5 TFESI(s). The patient will continue with his home exercise program.    He will continue with the Tylenol for the pain. The patient will follow up in 2-3 months, but the patient will call me 2 weeks after having the injection to let me know how the injection worked.

## 2022-09-02 RX ORDER — PREDNISONE 50 MG/1
TABLET ORAL
Qty: 3 TABLET | Refills: 0 | Status: SHIPPED | OUTPATIENT
Start: 2022-09-02

## 2022-09-02 NOTE — TELEPHONE ENCOUNTER
Patient has been scheduled for for Left L4 + Left L5 TFESI on 09/13/22 at the [facility] with . -Anesthesia type: LOCAL. -If scheduling [address] covid testing required for all procedures whether patient is vaccinated or not. -Patient informed not to eat or drink anything after midnight the night prior to the procedure, if being sedated. -Patient was advised that if he/she does receive the covid vaccine it needs to be at least 2 weeks before or after the injection. -Medications and allergies reviewed. -Patient reminded to hold NSAIDs (Ibuprofen, ASA 81, Aleve, Naproxen, Mobic, Diclofenac, Etodolac, Celebrex etc.) for 3 days prior to [facility]  if BMI is greater than 35. For Cervical injections only hold multivitamins, Vitamin E, Fish Oil, Phentermine (Lomaira) for 7 days prior to injection and NSAIDS.  mg to be held for 7 days prior to injections.  -If patient is receiving MAC/IVCS Phentermine Alyssa Or) will need to be held for 7 days prior to injection.  -If on blood thinner clearance has been received to hold this medication by provider.   -Patient informed he/she will need a  to and from procedure. -[facility] is located in the [facility] 1st floor. Patient may park in the yellow parking. Patient verbalized understanding and agrees with plan.  -----> Scheduled in Epic: Yes  -----> Scheduled in [employer]: Yes  Allergy prep sent to pharmacy and instructions given to patient. Patient confirmed with verbal read back.

## 2022-09-13 ENCOUNTER — OFFICE VISIT (OUTPATIENT)
Dept: SURGERY | Facility: CLINIC | Age: 82
End: 2022-09-13

## 2022-09-13 DIAGNOSIS — M54.16 LUMBAR RADICULOPATHY: Primary | ICD-10-CM

## 2022-09-13 DIAGNOSIS — M51.9 LUMBAR DISC DISEASE: ICD-10-CM

## 2022-09-13 DIAGNOSIS — M96.1 LUMBAR POST-LAMINECTOMY SYNDROME: ICD-10-CM

## 2022-09-13 PROCEDURE — 64484 NJX AA&/STRD TFRM EPI L/S EA: CPT | Performed by: PHYSICAL MEDICINE & REHABILITATION

## 2022-09-13 PROCEDURE — 64483 NJX AA&/STRD TFRM EPI L/S 1: CPT | Performed by: PHYSICAL MEDICINE & REHABILITATION

## 2022-09-13 NOTE — PROCEDURES
[address]    2-LEVEL LUMBAR TRANSFORAMINAL   NAME:  Corinne Medley    MR #:    [id number] :  1940     PHYSICIAN:  Kristin Ny MD        Operative Report    DATE OF PROCEDURE: 2022   PREOPERATIVE DIAGNOSES: 1. left L4 and L5-S1 and right S1 radiculopathy    2. Lumbar post-laminectomy syndrome: right L3-4    3. L1-2 mild diffuse, L2-3 central annular tear & mild diffuse, L3-4 mod diffuse, L4-5 right mod-large & left mod diffuse, L5-S1 left mod far lateral bulging discs        POSTOPERATIVE DIAGNOSES:   1. left L4 and L5-S1 and right S1 radiculopathy    2. Lumbar post-laminectomy syndrome: right L3-4    3. L1-2 mild diffuse, L2-3 central annular tear & mild diffuse, L3-4 mod diffuse, L4-5 right mod-large & left mod diffuse, L5-S1 left mod far lateral bulging discs        PROCEDURES: Left L4 and L5 transforaminal epidural steroid injections done under fluoroscopic guidance with contrast enhancement. SURGEON: Kristin Manzo MD   ANESTHESIA: Local   INDICATIONS:      OPERATIVE PROCEDURE:  Written consent was obtained from the patient. The patient was brought into the operating room and placed in the prone position on the fluoroscopy table with pillow underneath the abdomen. The patient's skin was cleaned and draped in a normal sterile fashion. Using AP fluoroscopy, all five lumbar vertebrae were identified. When the fourth and fifth vertebrae were identified, fluoroscopy was right anterior obliqued opening up the left L4-5 and left L5-S1 intervertebral foramen. At this point in time, each site was anesthetized with 1% PF lidocaine without epinephrine. Then, 5 inch, 22-gauge spinal needles were inserted and directed towards the left L4-5 and left L5-S1 intervertebral foramen. When they felt to be in good position, AP fluoroscopy was used to advance the needles to the 6 o'clock position on the left L4 and left L5 pedicles.   At this point in time, Omnipaque-240 contrast was used to obtain a good epidurogram indicating correct needle placement at each level. Then, aspiration was performed. No blood, fluid, or air was aspirated. Then, the patient was injected with a 3 cc solution of 1.5 cc of 40 mg/cc of Kenalog and 1.5 cc of 1% PF lidocaine without epinephrine at each site. After this, the needles were removed. Each site was cleaned. Band-Aids were applied. The patient was transferred to the cart and into [facility]. The patient was given discharge instructions and will follow up in the clinic as scheduled. Throughout the whole procedure, the patient's pulse oximetry and vital signs were monitored and they remained completely stable. Also, throughout the whole procedure, prior to injection of any medication, aspiration was performed. No blood, fluid, or air was aspirated at anytime.

## 2022-10-25 ENCOUNTER — LAB ENCOUNTER (OUTPATIENT)
Dept: LAB | Age: 82
End: 2022-10-25
Attending: INTERNAL MEDICINE
Payer: MEDICARE

## 2022-10-25 DIAGNOSIS — Z20.822 ENCOUNTER FOR PREPROCEDURE SCREENING LABORATORY TESTING FOR COVID-19: ICD-10-CM

## 2022-10-25 DIAGNOSIS — Z01.812 ENCOUNTER FOR PREPROCEDURE SCREENING LABORATORY TESTING FOR COVID-19: ICD-10-CM

## 2022-10-26 LAB — SARS-COV-2 RNA RESP QL NAA+PROBE: NOT DETECTED

## 2022-10-27 ENCOUNTER — TELEPHONE (OUTPATIENT)
Dept: GASTROENTEROLOGY | Facility: CLINIC | Age: 82
End: 2022-10-27

## 2022-10-28 ENCOUNTER — ANESTHESIA (OUTPATIENT)
Dept: ENDOSCOPY | Age: 82
End: 2022-10-28
Payer: MEDICARE

## 2022-10-28 ENCOUNTER — ANESTHESIA EVENT (OUTPATIENT)
Dept: ENDOSCOPY | Age: 82
End: 2022-10-28
Payer: MEDICARE

## 2022-10-28 ENCOUNTER — HOSPITAL ENCOUNTER (OUTPATIENT)
Age: 82
Setting detail: HOSPITAL OUTPATIENT SURGERY
Discharge: HOME | End: 2022-10-28
Attending: INTERNAL MEDICINE | Admitting: INTERNAL MEDICINE
Payer: MEDICARE

## 2022-10-28 VITALS
DIASTOLIC BLOOD PRESSURE: 73 MMHG | HEIGHT: 72 IN | TEMPERATURE: 97 F | HEART RATE: 73 BPM | WEIGHT: 262 LBS | OXYGEN SATURATION: 96 % | BODY MASS INDEX: 35.49 KG/M2 | SYSTOLIC BLOOD PRESSURE: 127 MMHG | RESPIRATION RATE: 16 BRPM

## 2022-10-28 DIAGNOSIS — Z20.822 ENCOUNTER FOR PREPROCEDURE SCREENING LABORATORY TESTING FOR COVID-19: ICD-10-CM

## 2022-10-28 DIAGNOSIS — Z01.812 ENCOUNTER FOR PREPROCEDURE SCREENING LABORATORY TESTING FOR COVID-19: ICD-10-CM

## 2022-10-28 DIAGNOSIS — K31.7 GASTRIC POLYPS: ICD-10-CM

## 2022-10-28 DIAGNOSIS — Z01.812 ENCOUNTER FOR PREOPERATIVE SCREENING LABORATORY TESTING FOR COVID-19 VIRUS: Primary | ICD-10-CM

## 2022-10-28 DIAGNOSIS — Z20.822 ENCOUNTER FOR PREOPERATIVE SCREENING LABORATORY TESTING FOR COVID-19 VIRUS: Primary | ICD-10-CM

## 2022-10-28 DIAGNOSIS — Z86.010 HISTORY OF COLON POLYPS: ICD-10-CM

## 2022-10-28 DIAGNOSIS — K59.00 CONSTIPATION, UNSPECIFIED CONSTIPATION TYPE: ICD-10-CM

## 2022-10-28 PROCEDURE — 88305 TISSUE EXAM BY PATHOLOGIST: CPT | Performed by: INTERNAL MEDICINE

## 2022-10-28 RX ORDER — SODIUM CHLORIDE, SODIUM LACTATE, POTASSIUM CHLORIDE, CALCIUM CHLORIDE 600; 310; 30; 20 MG/100ML; MG/100ML; MG/100ML; MG/100ML
INJECTION, SOLUTION INTRAVENOUS CONTINUOUS
Status: DISCONTINUED | OUTPATIENT
Start: 2022-10-28 | End: 2022-10-28

## 2022-10-28 RX ORDER — LIDOCAINE HYDROCHLORIDE 10 MG/ML
INJECTION, SOLUTION EPIDURAL; INFILTRATION; INTRACAUDAL; PERINEURAL AS NEEDED
Status: DISCONTINUED | OUTPATIENT
Start: 2022-10-28 | End: 2022-10-28 | Stop reason: SURG

## 2022-10-28 RX ORDER — SODIUM CHLORIDE, SODIUM LACTATE, POTASSIUM CHLORIDE, CALCIUM CHLORIDE 600; 310; 30; 20 MG/100ML; MG/100ML; MG/100ML; MG/100ML
INJECTION, SOLUTION INTRAVENOUS CONTINUOUS
OUTPATIENT
Start: 2022-10-28

## 2022-10-28 RX ORDER — NALOXONE HYDROCHLORIDE 0.4 MG/ML
80 INJECTION, SOLUTION INTRAMUSCULAR; INTRAVENOUS; SUBCUTANEOUS AS NEEDED
OUTPATIENT
Start: 2022-10-28 | End: 2022-10-28

## 2022-10-28 RX ADMIN — SODIUM CHLORIDE, SODIUM LACTATE, POTASSIUM CHLORIDE, CALCIUM CHLORIDE: 600; 310; 30; 20 INJECTION, SOLUTION INTRAVENOUS at 12:17:00

## 2022-10-28 RX ADMIN — LIDOCAINE HYDROCHLORIDE 50 MG: 10 INJECTION, SOLUTION EPIDURAL; INFILTRATION; INTRACAUDAL; PERINEURAL at 12:19:00

## 2022-10-28 RX ADMIN — SODIUM CHLORIDE, SODIUM LACTATE, POTASSIUM CHLORIDE, CALCIUM CHLORIDE: 600; 310; 30; 20 INJECTION, SOLUTION INTRAVENOUS at 13:06:00

## 2022-10-28 NOTE — DISCHARGE INSTRUCTIONS

## 2022-10-28 NOTE — OPERATIVE REPORT
[facility] Endoscopy Report      Preoperative Diagnosis:  - history of colon polyps   - history of gastric polyps      Postoperative Diagnosis:  - colon polyps x 7  - internal hemorrhoids  - gastritis      Procedure:    Colonoscopy   Esophagogastroduodenoscopy       Surgeon:  Gemma Holloway M.D. Anesthesia:  MAC     Technique:  After informed consent, the patient was placed in the left lateral recumbent position. Digital rectal examination revealed no palpable intraluminal abnormalities. An Olympus variable stiffness 190 series HD colonoscope was inserted into the rectum and advanced under direct vision by following the lumen to the ***. The colon was examined upon withdrawal in the ***. Following colonoscopy, an Olympus adult HD gastroscope was inserted into the hypopharynx and advanced under direct vision into the esophagus, stomach and duodenum. The endoscope was withdrawn to the stomach where retroflexion of the annulus, body, cardia and fundus was performed. The instrument was straightened, insufflated air and fluid were suctioned and the endoscope was withdrawn. The procedures were well tolerated without immediate complication. Findings:  The preparation of the colon was ***. The terminal ileum was examined for *** and visually normal.  The ileocecal valve was well preserved. The visualized colonic mucosa from the cecum to the anal verge was normal with an intact vascular pattern. The esophagus was normal.  The GE junction and diaphragmatic impression were at ***. The stomach distended appropriately with insufflated air.   The mucosa of the stomach including cardia, fundus, gastric body and antrum were normal.  The duodenal bulb and post bulbar regions were normal.      Impression:  - colon polyps x 7  - internal hemorrhoids  - gastritis      Recommendations:  - Post polypectomy instructions given  - Repeat colonoscopy in ***  - High fiber diet for diverticular disease  - Symptomatic treatment of hemorrhoids          Meghna Maravilla.  Jessica Ireland MD  10/28/2022  1:09 PM

## 2022-10-28 NOTE — TELEPHONE ENCOUNTER
On Call Note    Patient called this evening because he started his prep and hasn't had a bowel movement 2 hours later. Discussed the time to having bowel movements can be variable. Discussed continuing clear liquids and ambulation.  Reassurance given    Mohan Schreiber MD  [facility], [facility] - Gastroenterology  10/27/2022  7:46 PM

## 2022-11-07 ENCOUNTER — MED REC SCAN ONLY (OUTPATIENT)
Facility: CLINIC | Age: 82
End: 2022-11-07

## 2022-11-19 ENCOUNTER — TELEPHONE (OUTPATIENT)
Facility: CLINIC | Age: 82
End: 2022-11-19

## 2022-11-19 NOTE — TELEPHONE ENCOUNTER
----- Message from Tere Ochoa MD sent at 11/16/2022 11:01 AM CST -----  I wanted to get back to you with your colonoscopy and EGD results. You had 7 colon polyps removed which were benign. The polyps were all small and I would not advise further colonoscopy recall. You also have internal hemorrhoids. The EGD showed irritation of the stomach, gastritis. No evidence of bacteria on biopsies. Advise that the patient avoid spicy foods.      RN to forward records to PCP

## 2022-11-21 NOTE — TELEPHONE ENCOUNTER
Health Maintenance Updated that colonoscopy complete with no recall. I reviewed below complete result note with the patient and he voiced understanding.     I faxed results and Dr. Misty Mart note to PCP Dr. Celestino Bobo fax # [phone]

## 2022-12-07 ENCOUNTER — OFFICE VISIT (OUTPATIENT)
Dept: PHYSICAL MEDICINE AND REHAB | Facility: CLINIC | Age: 82
End: 2022-12-07
Payer: MEDICARE

## 2022-12-07 VITALS — SYSTOLIC BLOOD PRESSURE: 146 MMHG | DIASTOLIC BLOOD PRESSURE: 83 MMHG | OXYGEN SATURATION: 99 % | HEART RATE: 101 BPM

## 2022-12-07 DIAGNOSIS — M54.16 LUMBAR RADICULOPATHY: ICD-10-CM

## 2022-12-07 DIAGNOSIS — M48.062 SPINAL STENOSIS OF LUMBAR REGION WITH NEUROGENIC CLAUDICATION: ICD-10-CM

## 2022-12-07 DIAGNOSIS — R26.9 NEUROLOGIC GAIT DYSFUNCTION: ICD-10-CM

## 2022-12-07 DIAGNOSIS — M96.1 LUMBAR POST-LAMINECTOMY SYNDROME: ICD-10-CM

## 2022-12-07 DIAGNOSIS — M54.16 LUMBAR RADICULOPATHY: Primary | ICD-10-CM

## 2022-12-07 DIAGNOSIS — M48.061 LUMBAR FORAMINAL STENOSIS: ICD-10-CM

## 2022-12-07 DIAGNOSIS — M51.26 LUMBAR HERNIATED DISC: ICD-10-CM

## 2022-12-07 DIAGNOSIS — M51.9 LUMBAR DISC DISEASE: ICD-10-CM

## 2022-12-07 DIAGNOSIS — S09.90XA TRAUMATIC INJURY OF HEAD, INITIAL ENCOUNTER: Primary | ICD-10-CM

## 2022-12-07 PROCEDURE — 99214 OFFICE O/P EST MOD 30 MIN: CPT | Performed by: PHYSICAL MEDICINE & REHABILITATION

## 2023-02-07 ENCOUNTER — TELEPHONE (OUTPATIENT)
Dept: NEUROLOGY | Facility: CLINIC | Age: 83
End: 2023-02-07

## 2023-02-07 NOTE — TELEPHONE ENCOUNTER
Patient calling to inform that he is in a lot of pain and wants to be seen sooner than 3/7/23.  Please call to advice

## 2023-03-07 ENCOUNTER — TELEPHONE (OUTPATIENT)
Dept: PHYSICAL MEDICINE AND REHAB | Facility: CLINIC | Age: 83
End: 2023-03-07

## 2023-03-07 NOTE — TELEPHONE ENCOUNTER
Called patient and he has a very bad migraine and nausea. He is also the ride for the next patient, so he is going to call when he feels better to reschedule back to back appointments.

## 2023-04-20 ENCOUNTER — TELEPHONE (OUTPATIENT)
Dept: NEUROLOGY | Facility: CLINIC | Age: 83
End: 2023-04-20

## 2023-04-20 NOTE — TELEPHONE ENCOUNTER
SW patient and was able to get him and his life partner Sarai Diaz back to back appointments on 5/3/2023 at 4:00 and 4:30 pm.  Patient was thankful. RN recommended he try heat or ice to see if will help alleviate his pain.

## 2023-04-20 NOTE — TELEPHONE ENCOUNTER
Patient calling to see if Yamila Beltran has a sooner apt than 6/29/23 as he is in a lot of pain hopes he can schedule on injection.  Please call to advice

## 2023-04-20 NOTE — TELEPHONE ENCOUNTER
Dr. Sami Taylor instructed RN to get patient in somewhere asap. Patient called back patient to see if he can get here at 12:30 today, but he could not, so she offered him Monday at 12:30 for him, and then he mentioned a second patient he was trying to schedule, so RN told him she would have to speak to Dr. Sami Taylor again and see where in his schedule they might be able to squeeze in two patients soon.

## 2023-04-20 NOTE — TELEPHONE ENCOUNTER
Location of Pain: L4 & L5 (lower back) Left across to Right  Old or new pain: Old (but worsening)  Date Pain Began: 3/30/23            If the following symptoms are identified route high priority and verbally notify provider: Bowel/bladder incontinence, new/acute weakness, signs of infection (fever, redness, swelling, drainage), HA the worsens while standing and improves while laying. Numeric Rating Scale:  Pain at Present:  10/10                                                                                                            (No Pain) 0  to  10 (Worst Pain)                 Minimum Pain:   7  Maximum Pain  10    Distribution of Pain:    bilateral  Quality of Pain:   aching and constant  Aggravating Factors:    Standing, Walking and Other couldn't walk in the grocery store.   Current pain treatment: OTC medications and Tylenol 500mg    LOV:12/07/2022   NOV: 6/29/2023    Summary of patient request: Requesting a sooner appointment

## 2023-09-06 ENCOUNTER — TELEPHONE (OUTPATIENT)
Dept: PHYSICAL MEDICINE AND REHAB | Facility: CLINIC | Age: 83
End: 2023-09-06

## 2023-09-06 ENCOUNTER — OFFICE VISIT (OUTPATIENT)
Dept: PHYSICAL MEDICINE AND REHAB | Facility: CLINIC | Age: 83
End: 2023-09-06
Payer: MEDICARE

## 2023-09-06 VITALS — WEIGHT: 255 LBS | BODY MASS INDEX: 34.54 KG/M2 | HEIGHT: 72 IN

## 2023-09-06 DIAGNOSIS — M51.26 LUMBAR HERNIATED DISC: ICD-10-CM

## 2023-09-06 DIAGNOSIS — M50.90 CERVICAL DISC DISEASE: ICD-10-CM

## 2023-09-06 DIAGNOSIS — M96.1 LUMBAR POST-LAMINECTOMY SYNDROME: ICD-10-CM

## 2023-09-06 DIAGNOSIS — M48.062 SPINAL STENOSIS OF LUMBAR REGION WITH NEUROGENIC CLAUDICATION: ICD-10-CM

## 2023-09-06 DIAGNOSIS — M75.111 NONTRAUMATIC INCOMPLETE TEAR OF RIGHT ROTATOR CUFF: ICD-10-CM

## 2023-09-06 DIAGNOSIS — M48.061 LUMBAR FORAMINAL STENOSIS: Primary | ICD-10-CM

## 2023-09-06 DIAGNOSIS — R65.10 SIRS (SYSTEMIC INFLAMMATORY RESPONSE SYNDROME) (HCC): ICD-10-CM

## 2023-09-06 DIAGNOSIS — M48.02 FORAMINAL STENOSIS OF CERVICAL REGION: ICD-10-CM

## 2023-09-06 DIAGNOSIS — M54.16 LUMBAR RADICULOPATHY: ICD-10-CM

## 2023-09-06 DIAGNOSIS — M54.12 CERVICAL RADICULOPATHY: ICD-10-CM

## 2023-09-06 PROCEDURE — 99214 OFFICE O/P EST MOD 30 MIN: CPT | Performed by: PHYSICAL MEDICINE & REHABILITATION

## 2023-09-06 NOTE — PROGRESS NOTES
Low Back Pain H & P    Chief Complaint: Patient presents with:  Low Back Pain: LOV 12/7/2022 Patient follows up on low back pain that starts at the lateral side of the hips and radiates anterior to the thigh and across the back of his legs. Currently taking aleve PRN and tylenol arthritis PRN with relief. Denies PT,denies N/T. Pain is worse in AM. LOP 6/10    Nursing note reviewed and verified. Patient was last seen on 12/7/2022. He is most concerned about the left lateral hip pain that then radiates across the low back and then into the bilateral anterior thighs. He also has left neck pain that will radiate into th left shoulder. Description of the Pain - Cervical  The pain is located in the left base of the neck, left middle of the neck, and left base of the skull. The pain radiates to left shoulder. The pain at its best is 3/10. The pain at its worst is 10/10. The pain is currently  3/10. The pain is described as a(n) aching sensation. The patient reports no numbness. The patient reports no tingling. There is not weakness in bilateral hands and arms. The pain is worse in the morning. The pain is better  Tylenol Arthritis and Aleve . Description of the Pain - Lumbar  The pain is located in the bilateral low back. The pain radiates to the left lateral hip and bilateral anterior thigh. .  The pain at its best is 6/10. The pain at its worst is 10/10. The pain is currently  6/10. The pain is described as a(n) aching sensation. The pain is worse walking, going from sitting to standing, and in the morning. The pain is better  Tylenol Arthritis and Aleve . There is no numbness. There is no tingling in the legs. There is not weakness in both legs.      Past Medical History   Past Medical History:   Diagnosis Date    Anemia     Cancer ([address]     skin cancer per pt    Depression     Esophageal reflux     Essential hypertension     High blood pressure     High cholesterol Hyperlipidemia     Low back pain     Multiple gastric polyps 9/21/2018    PONV (postoperative nausea and vomiting)     Sleep apnea     repeat sleep study in 2018 was negative. stopped CPAP 1 year ago       Past Surgical History   Past Surgical History:   Procedure Laterality Date    [address]    L4-5 Disc Herniation, procedure unknown to pt    COLONOSCOPY  2017    COLONOSCOPY      COLONOSCOPY N/A 8/30/2019    Procedure: COLONOSCOPY;  Surgeon: Ariadna Lawrence MD;  Location: [facility] ENDOSCOPY    COLONOSCOPY N/A 10/28/2022    Procedure: COLONOSCOPY;  Surgeon: Ariadna Lawrence MD;  Location: [facility] ENDO    EGD      HERNIA SURGERY  1997    R side    UPPER GI ENDOSCOPY,EXAM         Family History   Family History   Problem Relation Age of Onset    Other (Other) Father         Natural Causes    Other (Other) Mother         Aneurysm       Social History   Social History    Socioeconomic History      Marital status: Single      Spouse name: Not on file      Number of children: Not on file      Years of education: Not on file      Highest education level: Not on file    Occupational History      Not on file    Tobacco Use      Smoking status: Never      Smokeless tobacco: Never    Vaping Use      Vaping Use: Never used    Substance and Sexual Activity      Alcohol use:  Yes        Alcohol/week: 2.0 standard drinks of alcohol        Types: 1 Cans of beer, 1 Glasses of wine per week        Comment: Occasional glass of wine      Drug use: No      Sexual activity: Not on file    Other Topics      Concerns:         Service: Not Asked        Blood Transfusions: Not Asked        Caffeine Concern: No          1 cup of coffee on occasion        Occupational Exposure: Not Asked        Hobby Hazards: Not Asked        Sleep Concern: Yes          trouble staying asleep        Stress Concern: Not Asked        Weight Concern: Not Asked        Special Diet: Not Asked        Back Care: Not Asked        Exercise: No        Bike Helmet: Not Asked        Seat Belt: Not Asked        Self-Exams: Not Asked    Social History Narrative      The patient does not use an assistive device. .        The patient does live in a home with stairs. Social Determinants of Health  Financial Resource Strain: Not on file  Food Insecurity: Not on file  Transportation Needs: Not on file  Physical Activity: Not on file  Stress: Not on file  Social Connections: Not on file  Housing Stability: Not on file    PE:  The patient does appear in his stated age in no distress. The patient is well groomed. Psychiatric:  The patient is alert and oriented x 3. The patient has a normal affect and mood. Respiratory:  No acute respiratory distress. Patient does not have a cough. HEENT:  Extraocular muscles are intact. There is no kern icterus. Pupils are equal, round, and reactive to light. No redness or discharge bilaterally. Skin:  There are no rashes or lesions. Lymph Nodes: The patient has no palpable submandibular, supraclavicular, and cervical lymph nodes. .    Vitals: There were no vitals filed for this visit. Cervical Spine:    Posture: severe chin forward superiorly rotated protracted shoulder posture. Shoulders: Level   Head: In neutral   Spinous Processes Palpations: Non-tender for all Spinous Processes   Z-Joints Palpations: Tender at  left OA, left C1-2, left C2-3, left C3-4, left C4-5, left C5-6, and left C6-7   Muscular Palpations: Non-tender to palpation. Vascular upper extremity:   Right radial pulses: 2+   Left radial pulses: 2+      Neurological Upper Extremity:    Light Touch: Intact in Bilateral upper extremities. Pin Prick: Not tested. UE Muscle Strength:  All Upper Extremity strength measurements 5/5   Reflexes: 2+ In the bilateral upper extremities except:  Left biceps where it is absent   Osorio's sign Right: Negative   Osorio's sigh Left: Negative     Gait:    Gait: Wide Based Sit to Stand: mild-moderate difficulty      Lumbar Spine:    Scoliosis: Thoracic dextroscoliosis that is mild and Lumbar levoscoliosis that is mild     Lumbar Spine Palpation:    Spinous Processes: Tender at L3 and L4   Z-joints: Non-tender for all Z-joints   SIJ: Non-tender for bilateral SIJ   Greater Trochanteric Bursa: Non-tender for bilateral Greater Trochanteric Bursa     Vascular lower extremity:   Dorsalis pedis pulse-RIGHT 2+   Dorsalis pedis pulse-LEFT 2+   Tibialis posterior pulse-RIGHT 2+   Tibialis posterior pulse-LEFT 2+     Neurological Lower Extremity:    Light Touch Sensation: Intact in bilateral Lower Extremities   LE Muscle Strength: All LE strength measurements 5/5 except:  Hamstring RIGHT:   4+/5  Hamstring LEFT:   4+/5  Hip Flexion RIGHT:  4+/5  Hip Flexion LEFT:   4+/5   RIGHT plantar reflexes: downward response   LEFT plantar reflexes: downward response   Reflexes: absent in bilateral lower extremities     Shoulder: The shoulders are stable. Medial Border Scapular Winging: absent   Right Scapula: laterally displaced   Left Scapula: laterally displaced   Palpation: Non-tender shoulder palpations. Right Internal Rotation: normal   Left Internal Rotation: normal   Right External Rotation: normal   Left External Rotation: normal   Shoulder Special Tests: Right and left Mistry test negative. Assessment  1. L5-S1 left mod, L4-5 bilateral mod, L3-4 left mild foraminal stenosis    2. L3-4 right moderate paracentral HNP    3. L3-4 mod, L4-5 mod central stenosis    4. Lumbar post-laminectomy syndrome: right L3-4    5. C4-5 right mod, C5-6 right mod foraminal stenosis    6. C3-4 left mild, C4-5 right mild-mod, C5-6 right mod foraminal bulging discs    7. left C5 radiculopathy    8. right supraspinatus moderate full thickness tear, right subscapularis mild-moderate inferior full thickness tear    9.  SIRS (systemic inflammatory response syndrome) (HCC)    10. bilateral L3-4 and S1 radiculopathies         Plan  I will perform bilateral L4 TFESI(s). The patient does not need any pain medications at this time. He will continue with his current activities. The patient will follow up in 2-6 months, but the patient will call me 2 weeks after having the injection to let me know how the injection worked. The patient understands and agrees with the stated plan. Judy Jacobson MD  9/6/2023

## 2023-09-06 NOTE — PATIENT INSTRUCTIONS
Plan  I will perform bilateral L4 TFESI(s). The patient does not need any pain medications at this time. He will continue with his current activities. The patient will follow up in 2-6 months, but the patient will call me 2 weeks after having the injection to let me know how the injection worked.

## 2023-09-06 NOTE — TELEPHONE ENCOUNTER
Per CMS Guidelines -no authorization is required for Bilateral L4 TFESIs CPT 61191-65     Status: Authorization is not required however may be subject to review once claim is submitted-Covered Benefit

## 2023-09-07 NOTE — TELEPHONE ENCOUNTER
Patient has a new PCP Lauren Renner. PCP manages patient's ASA 81 mg. Need clearance to hold 3 days prior to injection.     Lauren Renner  Phone #[phone]  Fax #: [phone]     Antioag letter faxed to Lauren Renner Fax #: [phone]     Status of Anticoag  [x] Clearance pending   [] Clearance approved  [] Clearance denied

## 2023-09-08 NOTE — TELEPHONE ENCOUNTER
Status of Anticoag  [] Clearance pending  [x] Clearance approved to hold ASA 81mg for 3 days prior to procedure. Placed into scanning. [] Clearance denied    Patient has been scheduled for Bilateral L4 transforaminal epidural steroid injection on 9/19/2023 at the [facility] with Dr. Araceli Hutchinson. -Anesthesia type: Local.  -Patient was advised that if he/she does receive the covid vaccine it needs to be at least 2 weeks before or after the injection. -Medications and allergies reviewed. -Patient reminded to hold NSAIDs (Ibuprofen, ASA 81, Aleve, Naproxen, Mobic, Diclofenac, Etodolac, Celebrex etc.) for 3 days prior to Lumbar MBB/Facet if BMI is greater than 35. For Cervical injections only hold multivitamins, Vitamin E, Fish Oil, Phentermine/Lomaira for 7 days prior to injection and NSAIDS.  mg to be held for 7 days prior to injections.  -If patient is receiving MAC/IVCS, weight loss oral/injectable medications will need to be held for 7 days prior to injection.  -Patient informed to fast 12 hours prior to procedure with IVCS/MAC.   -If on blood thinner, clearance has been received and approved to hold this medication by provider.   -Patient informed he/she will need a  to and from procedure. [facility] is located in the [facility] [address] 1st floor,  may park in the yellow/purple parking lot. Patient verbalized understanding and agrees with plan. Scheduled in Epic: Yes  Scheduled in Surgical Case: Yes  Follow up appointment made:  Yes

## 2023-11-06 ENCOUNTER — TELEPHONE (OUTPATIENT)
Dept: PHYSICAL MEDICINE AND REHAB | Facility: CLINIC | Age: 83
End: 2023-11-06

## 2023-11-06 NOTE — TELEPHONE ENCOUNTER
pt called to reschedule injection, call was disconnected before I could reach a nurse, please call back.

## 2024-09-19 NOTE — PROCEDURES
The patient is here for a left shoulder injection done under ultrasound guidance. Under ultrasound guidance the left posterior glenohumeral joint was identified and a jabari was placed on the patient's skin. The skin was cleaned with alcohol swabs x 3 and an Detail Level: Detailed

## (undated) DIAGNOSIS — Z86.010 HISTORY OF COLON POLYPS: ICD-10-CM

## (undated) DIAGNOSIS — K31.7 GASTRIC POLYPS: ICD-10-CM

## (undated) DIAGNOSIS — K59.00 CONSTIPATION, UNSPECIFIED CONSTIPATION TYPE: Primary | ICD-10-CM

## (undated) DEVICE — FORCEP RADIAL JAW 4

## (undated) DEVICE — Device: Brand: CUSTOM PROCEDURE KIT

## (undated) DEVICE — SNARE CAPTIFLEX MICRO-OVL OLY

## (undated) DEVICE — SNARE ENDOSCOPIC 10MM ROUND

## (undated) DEVICE — 35 ML SYRINGE REGULAR TIP: Brand: MONOJECT

## (undated) DEVICE — LINE MNTR ADLT SET O2 INTMD

## (undated) DEVICE — MEDI-VAC NON-CONDUCTIVE SUCTION TUBING 6MM X 1.8M (6FT.) L: Brand: CARDINAL HEALTH

## (undated) DEVICE — SNARE OPTMZ PLPCTM TRP

## (undated) DEVICE — Device: Brand: DEFENDO AIR/WATER/SUCTION AND BIOPSY VALVE

## (undated) DEVICE — REM POLYHESIVE ADULT PATIENT RETURN ELECTRODE: Brand: VALLEYLAB

## (undated) DEVICE — Device

## (undated) DEVICE — TRAP MCS 40ML 5IN PLS SCR CAP

## (undated) DEVICE — KIT ENDO ORCAPOD 160/180/190

## (undated) DEVICE — CONMED SCOPE SAVER BITE BLOCK, 20X27 MM: Brand: SCOPE SAVER

## (undated) DEVICE — ENDOSCOPY PACK UPPER: Brand: MEDLINE INDUSTRIES, INC.

## (undated) DEVICE — CLIP LGT 11MM OPEN 2.8MM 235CM

## (undated) DEVICE — KIT CLEAN ENDOKIT 1.1OZ GOWNX2

## (undated) DEVICE — CLIP RESOLUTION 235CM

## (undated) NOTE — LETTER
July 5, 2017         Rafaela Sotelo MD  [facility] [address]      Patient: Angela Prado   YOB: 1940   Date of Visit: 7/3/2017       Dear  Dr. Rafaela Sotelo MD,      Thank you for referring Angela Eve to my practice. post-op care discussed. Pt is to call our office to schedule procedure.     (R35.1) Nocturia  Plan: Onset: 5-6 years ago, possibly after single episode of urinary tract infection.  His American u[employer]ic Association voiding score is  28,  severe voiding dysf 5.  Please hold aspirin Ecotrin (= baby aspirin 81 mg) for 7 days before procedure                  Hoa Holland  is a very pleasant patient and I thoroughly enjoyed evaluating him  in consultation. I thank you for sending the patient to see me.   I will do my

## (undated) NOTE — LETTER
ANG. Notifier: Stephen[facility]   CEFERINO. Patient Name: Ramy Méndez. Identification Number: [id number]      Advance Beneficiary Notice of Noncoverage (ABN)  NOTE:  If Medicare doesn’t pay for D. Right shoulder injection under ultrasound guidance below, y directions on the MSN. If Medicare does pay, you will refund any payments I made to you, less co-pays or deductibles. ? OPTION 2. I want the D. Right shoulder injection under ultrasound guidance listed above, but do not bill Medicare.  You may ask to be p

## (undated) NOTE — LETTER
7/16/2017              [phone]         Dear Sumaya Wray,    On 7/3/17 urinalysis urine test showed no significant microscopic blood in the urine.   Urine for cytology showed no cancer tam

## (undated) NOTE — LETTER
11/19/2021      Nely Momin MD  Physical Medicine and Rehabilitation  [address] [address]  Dept: [phone]  Dept Fax: [phone]        RE: Consultation for Olivia Reese        Dear Love Arreola MD,    Thank you

## (undated) NOTE — IP AVS SNAPSHOT
[facility]            (For Outpatient Use Only) Initial Admit Date: 12/22/2019   Inpt/Obs Admit Date: Inpt: 12/22/19 / Obs: N/A   Discharge Date:    Brandi Gzumán:  [de-identified]   MRN: [de-identified]   CSN: [id number]   CEID: [id number]        E Group Number:  Insurance Type:    Subscriber Name:  Subscriber :    Subscriber ID:  Pt Rel to Subscriber:    Hospital Account Financial Class: Medicare    2020

## (undated) NOTE — LETTER
[facility], [place]  Authorization for Invasive Procedures  1.  I hereby authorize Dr. Anna Weathers , my physician and whomever may be designated as the doctor's assistant, to perform the following operation and/or procedure:  Colonoscop performed for the purposes of advancing medicine, science, and/or education, provided my identity is not revealed. If the procedure has been videotaped, the physician/surgeon will obtain the original videotape.  The hospital will not be responsible for stor My signature below affirms that prior to the time of the procedure, I have explained to the patient and/or his legal representative, the risks and benefits involved in the proposed treatment and any reasonable alternative to the proposed treatment.  I have

## (undated) NOTE — LETTER
9/1/2022      Emory Jones MD  Physical Medicine and Rehabilitation  [facility], [address] [place] [address]  Dept: [phone]  Dept Fax: [phone]        RE: Consultation for Naya Francis        Dear Han Valdez MD,    Thank you very much for the opportunity to see your patient. Attached please find a summary from your patient's recent visit. I appreciate the chance to take care of your patient with you. Please feel free to call me with any questions or concerns. Sincerely,        Aryan Kim.  Teresa Jones MD  Electronically Signed on 9/1/2022

## (undated) NOTE — MR AVS SNAPSHOT
[facility]  [address] [place], [place] [address] [address] [phone]               Thank you for choosing us for your health care visit with Cassie Sparrow MD.  We are glad to serve you and happy to provide you with this summary of yo acquired. Please contact the Patient Business Office at [phone] if you have any questions related to insurance coverage. Thank you.          Reason for Today's Visit     Abdominal Pain     Diarrhea     Constipation     Bloating           Medical Issues FLUoxetine HCl 20 MG Caps   Take 20 mg by mouth daily. Commonly known as:  PROZAC           furosemide 20 MG Tabs   Take 40 mg by mouth daily. Commonly known as:  LASIX           hydrALAzine HCl 25 MG Tabs   Take 25 mg by mouth 2 (two) times daily. Your blood pressure indicates you may be at-risk for Hypertension. Please consider the following Lifestyle Modifications. Also, please return for a follow-up Blood Pressure Check in 1 year.      Lifestyle Modification Recommendations:    Modification Re Get your heart pumping – brisk walking, biking, swimming Even 10 minute increments are effective and add up over the week   2 ½ hours per week – spread out over time Use a homero to keep you motivated   Don’t forget strength training with weights and resist

## (undated) NOTE — LETTER
AUTHORIZATION FOR SURGICAL OPERATION OR OTHER PROCEDURE    1.  I hereby authorize Dr. Braeden Villa and the [facility] staff assigned to my case to perform the following operation and/or procedure at the [facility]:    _______________________Right shoulder in (please print)       Patient signature:  ___________________________________________________             Relationship to Patient:           []  Parent    Responsible person                          []  Spouse  In case of minor or                    [] Othe

## (undated) NOTE — LETTER
[facility] SURGERY SCHEDULING FORM   [address] [facility]   [phone] (scheduling phone) [phone] (scheduling fax)     PATIENT INFORMATION   Last Name:      Shi Orozco Name:    Hoa Holland []  No or using our own   Allergies: Latex; Radiology Contrast Iodinated Dyes    Patient will be self driving     Completed by:    Stephen Lopez      Date:    8/23/2018

## (undated) NOTE — LETTER
No referring provider defined for this encounter.        11/20/17        Patient: Mahesh Polanco   YOB: 1940   Date of Visit: 11/20/2017       Dear  Dr. Patricia Blanton MD,      To workup reported bladder abnormality on CT scan, today I After explaining the findings of today's office procedure to the  patient,  I had a separate discussion  with Jory Pham on further treatment options, and this was separate from the procedure performed today .      DISCUSSION  ON FURTHER TREATMENT OPTIONS FOR quadrant pain and pelvic pain with constipation: kidneys = low density probable cysts present bilaterally; bones = multilevel degenerative changes of the visualized spine, degenerative disc disease with endplate sclerosis most pronounced at L3-L4 and L4-L5 will need to hold medication prior to any surgical procedure. RECOMMENDATIONS AND TREATMENT PLAN     1.   Because of new discovery of impaired kidney function/renal failure, please get ultrasound of the kidneys to make sure blockage of the kidneys has

## (undated) NOTE — LETTER
10/31/2018      Genette Cables A. Viann Meckel, MD  Physical Medicine and Rehabilitation  [address] [address]  Dept: [phone]  Dept Fax: [phone]        RE: Consultation for Cornelius Guo        Dear Padma Anderson MD,    Thank you

## (undated) NOTE — LETTER
12/7/2022      Jc Gramajo MD  Physical Medicine and Rehabilitation  2010 [facility], [address] [place] [address]  Dept: [phone]  Dept Fax: [phone]        RE: Consultation for Elen Bolaños        Dear Hipolito Aguilera MD,    Thank you very much for the opportunity to see your patient. Attached please find a summary from your patient's recent visit. I appreciate the chance to take care of your patient with you. Please feel free to call me with any questions or concerns. Sincerely,        Ashley Moreno.  Toyin Gramajo MD  Electronically Signed on 12/7/2022

## (undated) NOTE — LETTER
23        To Whom It May Concern:      Wong Gandara  :  1940    []  Patient has been cleared to hold Aspirin 81 mg 3 days prior to  Bilateral L4 Transforaminal Epidural Steroid Injections. Holding the medication(s) may put the patient at an increased risk for stroke, heart attack, or neurologic or cardiac events. []  Patient has NOT been cleared to hold Aspirin 81 mg 3 days prior to  Bilateral L4 Transforaminal Epidural Steroid Injections for procedure. X_________________________________________  (Provider signature)                                     (Date)      Please make a selection, sign and fax this letter back to our office    If this office may be of further assistance, please do not hesitate to contact us. Sincerely,    Ahmet Taylor MD    Phone #: [phone]  Fax #: [phone]

## (undated) NOTE — LETTER
[facility]  Administration of Anesthesia  1. I, Kathia Willard, or _________________________________ acting on his behalf, (Patient) (Dependent/Representative) request to receive anesthesia for my pending procedure/operation/treatment.   ANG marie infections, high spinal block, spinal bleeding, seizure, cardiac arrest and death. 7. AWARENESS: I understand that it is possible (but unlikely) to have explicit memory of events from the operating room while under general anesthesia.   8. ELECTROCONVULSIV unconscious pt /Relationship    My signature below affirms that prior to the time of the procedure, I have explained to the patient and/or his/her guardian, the risks and benefits of undergoing anesthesia, as well as any reasonable alternatives.     _______

## (undated) NOTE — LETTER
12/30/2019    Kalp[address]mana [phone]            Dear Kalpana Velasco,      Our records indicate that you are due for an appointment for a EGD or Upper Endoscopy in February 2020, or shortly there after, wi

## (undated) NOTE — LETTER
9/6/2023      Abhi Masterson MD  Physical Medicine and Rehabilitation  2010 [facility], [address] [place] [address]  Dept: [phone]  Dept Fax: [phone]        RE: Consultation for Yomaira Ireland        Dear Raine Martins MD,    Thank you very much for the opportunity to see your patient. Attached please find a summary from your patient's recent visit. I appreciate the chance to take care of your patient with you. Please feel free to call me with any questions or concerns. Sincerely,        Fred Ferro.  Blu Masterson MD  Electronically Signed on 9/6/2023

## (undated) NOTE — LETTER
7/26/2022      Abhi Masterson MD  Physical Medicine and Rehabilitation  2010 [facility], [address] [place] [address]  Dept: [phone]  Dept Fax: [phone]        RE: Consultation for Yomaira Ireland        Dear Raine Martins MD,    Thank you very much for the opportunity to see your patient. Attached please find a summary from your patient's recent visit. I appreciate the chance to take care of your patient with you. Please feel free to call me with any questions or concerns. Sincerely,        Fred Masterson MD  Electronically Signed on 7/26/2022

## (undated) NOTE — LETTER
8/22/2018      Doe Khuory MD  Physical Medicine and Rehabilitation  [address] [address]  Dept: [phone]  Dept Fax: [phone]        RE: Consultation for Angela Prado        Dear Valerie Bazan MD,    Thank you

## (undated) NOTE — LETTER
9/13/2022      Nicolle Lau MD  Physical Medicine and Rehabilitation  2010 [facility], [address] [place] [address]  Dept: [phone]  Dept Fax: [phone]        RE: Consultation for Richard Epps        Dear Miladys Hernandez MD,    Thank you very much for the opportunity to see your patient. Attached please find a summary from your patient's recent visit. I appreciate the chance to take care of your patient with you. Please feel free to call me with any questions or concerns. Sincerely,        Eduardo Cornell.  Meenu Lau MD  Electronically Signed on 9/13/2022

## (undated) NOTE — LETTER
[facility]  A. Notifier:    HEMAL Howell  [id number]    Advance Beneficiary Notice of Noncoverage (ABN)    Note:  If Medicare doesn't pay for D. Left shoulder steroid injection with ultrasound guidance    below, you may have to pay.   Me Option 2:  I want the D.    listed above, but do not bill Medicare. You may ask to be paid now as I am responsible for payment. I cannot appeal if Medicare is not billed. Option 3:  I don't want the D.    listed above.   I understand with this morales

## (undated) NOTE — LETTER
AUTHORIZATION FOR SURGICAL OPERATION OR OTHER PROCEDURE    1.  I hereby authorize Dr. Lola Wright and the [facility] staff assigned to my case to perform the following operation and/or procedure at the [facility]:    ___________Left shoulder steroid injection w Time:  ________ A. M.  P.M.        Patient Name:  __Sean Miranda [birth year] [id number]____________________________________________________  (please print)       Patient signature:  ___________________________________________________

## (undated) NOTE — MR AVS SNAPSHOT
[address]  [facility]  [address] [facility]  [phone]               Thank you for choosing us for your health care visit with Ebony Carrillo.  Viann Meckel, MD.  We are glad to serve you and happy to provide you wit Lumbar post-laminectomy syndrome   Lumbar herniated disc   Lumbar radiculopathy   Neurologic gait dysfunction   Order:  Physical Therapy - Internal        Comment:  L5-S1 left mod, L4-5 bilateral mod, L3-4 left mild foraminal stenosis  (primary encounter d [facility] · [address], [address] [facility] · [phone]        L5-S1 left mod, L4-5 bilateral mod, L3-4 left mild foraminal stenosis  (primary encounter diagnosis)  L2-3 left mild-mod/right mild bulging disc & central annular ? No narcotics or controlled substances are refilled after noon on Fridays or by on call physicians. ? By law, narcotics cannot be faxed or phoned into your pharmacy.  The prescription must be signed by the provider, picked up in our office and physically contact your insurance carrier to verify that your procedure/test has been approved and is a COVERED benefit.   Although the [facility] staff does its due diligence, the insurance carrier gives the disclaimer that \"Although the procedure is authorized, this does Commonly known as:  BENADRYL           FLUoxetine HCl 20 MG Caps   Take 20 mg by mouth daily. Commonly known as:  PROZAC           furosemide 20 MG Tabs   Take 40 mg by mouth daily.    Commonly known as:  LASIX           hydrALAzine HCl 25 MG Tabs   Take Educational Information     Your blood pressure indicates you may be at-risk for Hypertension. Please consider the following Lifestyle Modifications. Also, please return for a follow-up Blood Pressure Check in 1 year.      Lifestyle Modification Recommen

## (undated) NOTE — LETTER
7/20/2022      Duke Iverson MD  Physical Medicine and Rehabilitation  2010 [facility], [address] [place] [address]  Dept: [phone]  Dept Fax: [phone]        RE: Consultation for Severo Lights        Dear Delon Atwood MD,    Thank you very much for the opportunity to see your patient. Attached please find a summary from your patient's recent visit. I appreciate the chance to take care of your patient with you. Please feel free to call me with any questions or concerns. Sincerely,        Marsha Payton.  Sima Iverson MD  Electronically Signed on 7/20/2022

## (undated) NOTE — Clinical Note
5/10/2017      Adelso Koroma MD  Physical Medicine and Rehabilitation  [address] [address]  Dept: [phone]  Dept Fax: [phone]        RE: Consultation for Noreen Rain        Dear Conrad Owens MD,    Thank you

## (undated) NOTE — LETTER
No referring provider defined for this encounter. 07/03/17        Patient: Lorrine Ou   YOB: 1940   Date of Visit: 7/3/2017       Dear  Dr. Emanuel Cates MD,      Thank you for referring Lorrine Ou to my practice.   Please post-op care discussed. Pt is to call our office to schedule procedure.     (R35.1) Nocturia  Plan: Onset: 5-6 years ago, possibly after single episode of urinary tract infection.  His American urologic Association voiding score is  28,  severe voiding dysf 5.  Please hold aspirin Ecotrin (= baby aspirin 81 mg) for 7 days before procedure        Ansley Maki  is a very pleasant patient and I thoroughly enjoyed evaluating him  in consultation. I thank you for sending the patient to see me.   I will do my best to ke

## (undated) NOTE — LETTER
8/20/2021      Unknown Press LIBERTAD hPelps MD  Physical Medicine and Rehabilitation  2010 [facility], [address] [place] [address]  Dept: [phone]  Dept Fax: [phone]        RE: Consultation for Ayla Olivares        Dear Ysabel Villa MD,    Thank you

## (undated) NOTE — LETTER
[facility] SURGERY SCHEDULING FORM   [address]  [address] [facility]   [phone] (scheduling phone) [phone] (scheduling fax)     PATIENT INFORMATION   Last Name:      Bridget Conklin Name:    Rosalio Perez Allergies: Latex; Radiology Contrast Iodinated Dyes      Patient prescribed allergy prep         Completed by:   Sven Cardenas      Date:   9/10/2018

## (undated) NOTE — LETTER
11/4/2021      Adelso Koroma MD  Physical Medicine and Rehabilitation  2010 [facility], [address] [place] [address]  Dept: [phone]  Dept Fax: [phone]        RE: Consultation for Noreen Rain        Dear Facundo Vann MD,    Thank you v

## (undated) NOTE — LETTER
9/20/2017      Elton Rico MD  Physical Medicine and Rehabilitation  [address] [address]  Dept: [phone]  Dept Fax: [phone]        RE: Consultation for Mateusz Cameron        Dear Afshan Barker MD,    Thank you

## (undated) NOTE — LETTER
9/28/2018      Prasanna Barros MD  Physical Medicine and Rehabilitation  [address] [address]  Dept: [phone]  Dept Fax: [phone]        RE: Consultation for Marilynn Milner        Dear Edward Rodriguez MD,    Thank you

## (undated) NOTE — IP AVS SNAPSHOT
Patient Demographics     Address  [address] [place] [address] RADHA [address] [address] Phone  [phone] (Home) *Preferred*  [phone] [facility]) E-mail Address  [email or web address]      Emergency Contact(s)     Name Relation Home Work Mobile    Or Commonly known as:  Haider Dies  Start taking on:  January 9, 2020  Next dose due: Tomorrow morning      Take 1 tablet (5 mg total) by mouth daily. Shawna Kim MD         apixaban 5 MG Tabs  Commonly known as:  ELIQUIS  Next dose due:   [address] [facility] Metoprolol Tartrate 50 MG Tabs  Commonly known as:  LOPRESSOR  Next dose due:  dinnertime      Take 1 tablet (50 mg total) by mouth 2 (two) times daily.    Rosmery Bonilla MD         omega-3 fatty acids 1000 MG Caps  Commonly known as:  FISH OIL  Next dose Where to Get Your Medications      These medications were sent to [address], [address] AT [address] [place] [address], [phone], [phone]  [address] [address]    Phone: [phone] Piperacillin Sod-Tazobactam So (ZOSYN) 4.5 g in dextrose 5 % 100 mL MBP/ADD-vantage 01/08/20 1107 New Bag      [id number] Pravastatin Sodium (PRAVACHOL) tab 20 mg 01/07/20 2155 Given      [id number] allopurinol (ZYLOPRIM) tab 200 mg 01/08/20 0983 BASIC METABOLIC PANEL (8) [[id number]] (Abnormal)  Resulted: 01/08/20 0629, Result status: Final result   Ordering provider:  Holger Ulloa MD  01/07/20 1002 Resulting lab:  [facility]    Specimen Information    Type Source Collected On   Blood — 01/08/20 Component Value Reference Range Flag Lab   Slide Review Platelets reviewed on smear — — [facility]            Testing Performed By     Lab - Abbreviation Name Director Address Valid Date Range    [address] [facility] [place] [facility] oLrraine Pulido.  Denisse Dempsey M.D. 155 Filed:  12/27/2019  8:15 AM Status:  Signed    :  Ese Clinton MD (Physician)       Quinn Estrada    PATIENT'S NAME: Elijah Light   ATTENDING PHYSICIAN: Bhaskar Conn MD   PATIENT ACCOUNT#:   [id number]    LOCATION:  [address] [place] [address] REVIEW OF SYSTEMS:  Negative for chest pain. No shortness of breath, no abdominal pain, no headache, no cough. Positive fever at 103.5. He did have nausea, but no vomiting. No diarrhea.   He did have hematuria, dysuria; both started after the prostate b Related Notes:  Original Note by Michelet Pinzon MD (Physician) filed at 1/1/2020  3:44 PM     Consult Orders    1.  Consult to Gastroenterology [[id number]] ordered by Dash Dolan MD at 01/01/20 200 [facility] recumbent position. Digital rectal examination revealed no palpable intraluminal abnormalities. An Olympus variable stiffness 190 series HD colonoscope was inserted into the rectum and advanced under direct vision by following the lumen to the cecum/TI. technique with clip placement across them. Specimens were retrieved and sent for analysis. The duodenal bulb and post bulbar regions were normal.[AL.1]    Sister and brother in law present in [facility]. 2]     Impression:  - colon polyps x 5  - internal hem • UPPER GI ENDOSCOPY,EXAM       Family History   Problem Relation Age of Onset   • Other (Other) Father         Natural Causes   • Other (Other) Mother         Aneurysm      reports that he has never smoked.  He has never used smokeless tobacco. He reports breakfast  •  Oxybutynin Chloride ER (DITROPAN-XL) 24 hr tab 10 mg, 10 mg, Oral, Daily  •  traZODone HCl (DESYREL) tab 50 mg, 50 mg, Oral, Nightly  •  acetaminophen (TYLENOL EXTRA STRENGTH) tab 500 mg, 500 mg, Oral, Q4H PRN  •  Normal Saline Flush 0.9 % in HGB 15.0 01/01/2020    HCT 47.3 01/01/2020    .0 01/01/2020    CREATSERUM 1.86 01/01/2020    BUN 27 01/01/2020     01/01/2020    K 3.9 01/01/2020    CL 95 01/01/2020    CO2 31.0 01/01/2020     01/01/2020    CA 11.1 01/01/2020       Maria Alejandra - Discussed with patient that if he has worsening pain, distension, etc to let us know as may need colonic decompression  - KUB ordered for the AM[AL.3]    Thank you for the opportunity to participate in the care of this patient.     Yuki Maurer MD[AL.1] by noncontrast technique. Pt cont to have significant pain on his LB are affecting his ability to mobilized self or tolerate mobilization. No significant numbness or weakness on B LE at this time.   However due to pain  He requires time and max A in almendarez ACTIVITY TOLERANCE: poor due to pain          AM-PAC '6-Clicks' INPATIENT SHORT FORM - BASIC MOBILITY  How much difficulty does the patient currently have. ..[TF.1]  -   Turning over in bed (including adjusting bedclothes, sheets and blankets)?: A Little rolling at assistance level: modified independent   Goal #3   Current Status  50 with RW with min A/mod A chair following   Goal #4 Patient will negotiate one curb w/ assistive device and supervision   Goal #4   Current Status  NT   Goal #5 Patient to demo Attribution Key    [facility]. 1 - Kavitha Peña OT on 1/6/2020  3:00 PM                     Video Swallow Study Notes    No notes of this type exist for this encounter. SLP Notes    No notes of this type exist for this encounter.      Future Appoint

## (undated) NOTE — Clinical Note
6/2/2017      Juancarlos Cutler MD  Physical Medicine and Rehabilitation  [address] [address]  Dept: [phone]  Dept Fax: [phone]        RE: Consultation for Marcin Maldonado        Dear Rosmery Bonilla MD,    Thank you v

## (undated) NOTE — LETTER
[facility], [place]  Authorization for Invasive Procedures  1.  I hereby authorize Dr. Omega Zamora , my physician and whomever may be designated as the doctor's assistant, to perform the following operation and/or procedure:  *L3/L performed for the purposes of advancing medicine, science, and/or education, provided my identity is not revealed. If the procedure has been videotaped, the physician/surgeon will obtain the original videotape.  The hospital will not be responsible for stor My signature below affirms that prior to the time of the procedure, I have explained to the patient and/or his legal representative, the risks and benefits involved in the proposed treatment and any reasonable alternative to the proposed treatment.  I have

## (undated) NOTE — LETTER
8/9/2017      Rosa Martin MD  Physical Medicine and Rehabilitation  [address] [address]  Dept: [phone]  Dept Fax: [phone]        RE: Consultation for [facility]        Dear Armaan Santillan MD,    Thank you v

## (undated) NOTE — Clinical Note
[facility] SURGERY SCHEDULING FORM   [address] [place] [address]   [phone] (scheduling phone) [phone] (scheduling fax)     PATIENT INFORMATION   Patient Name:    Joanne Mcgee   :    1940   T[place]one Completed by:    Karen Lambert      Date:    5/10/2017    [facility] will follow its Pre-Admission Assessment and Screening Policy for pre-admission testing.   Physicians that require   additional testing must order this directly and have results faxed to [facility] at 3